# Patient Record
Sex: FEMALE | Race: WHITE | Employment: UNEMPLOYED | URBAN - METROPOLITAN AREA
[De-identification: names, ages, dates, MRNs, and addresses within clinical notes are randomized per-mention and may not be internally consistent; named-entity substitution may affect disease eponyms.]

---

## 2017-01-01 ENCOUNTER — APPOINTMENT (OUTPATIENT)
Dept: GENERAL RADIOLOGY | Age: 82
DRG: 480 | End: 2017-01-01
Attending: INTERNAL MEDICINE
Payer: MEDICARE

## 2017-01-01 ENCOUNTER — APPOINTMENT (OUTPATIENT)
Dept: GENERAL RADIOLOGY | Age: 82
End: 2017-01-01
Attending: PHYSICIAN ASSISTANT
Payer: MEDICARE

## 2017-01-01 ENCOUNTER — APPOINTMENT (OUTPATIENT)
Dept: GENERAL RADIOLOGY | Age: 82
DRG: 480 | End: 2017-01-01
Attending: EMERGENCY MEDICINE
Payer: MEDICARE

## 2017-01-01 ENCOUNTER — HOSPITAL ENCOUNTER (OUTPATIENT)
Dept: GENERAL RADIOLOGY | Age: 82
Discharge: HOME | End: 2017-12-13
Attending: FAMILY MEDICINE
Payer: MEDICARE

## 2017-01-01 ENCOUNTER — HOSPITAL ENCOUNTER (OUTPATIENT)
Dept: GENERAL RADIOLOGY | Age: 82
Discharge: HOME | End: 2017-10-26
Payer: MEDICARE

## 2017-01-01 ENCOUNTER — APPOINTMENT (OUTPATIENT)
Dept: CT IMAGING | Age: 82
DRG: 480 | End: 2017-01-01
Attending: EMERGENCY MEDICINE
Payer: MEDICARE

## 2017-01-01 ENCOUNTER — HOSPITAL ENCOUNTER (OUTPATIENT)
Dept: NON INVASIVE DIAGNOSTICS | Age: 82
Discharge: HOME | End: 2017-09-01
Attending: INTERNAL MEDICINE
Payer: MEDICARE

## 2017-01-01 ENCOUNTER — APPOINTMENT (OUTPATIENT)
Dept: GENERAL RADIOLOGY | Age: 82
End: 2017-01-01
Attending: EMERGENCY MEDICINE
Payer: MEDICARE

## 2017-01-01 ENCOUNTER — HOSPITAL ENCOUNTER (INPATIENT)
Age: 82
LOS: 10 days | Discharge: NURSING, REHAB OR LONG-TERM CARE | DRG: 480 | End: 2017-11-29
Attending: EMERGENCY MEDICINE | Admitting: INTERNAL MEDICINE
Payer: MEDICARE

## 2017-01-01 ENCOUNTER — APPOINTMENT (OUTPATIENT)
Dept: CT IMAGING | Age: 82
DRG: 193 | End: 2017-01-01
Attending: EMERGENCY MEDICINE
Payer: MEDICARE

## 2017-01-01 ENCOUNTER — HOSPITAL ENCOUNTER (EMERGENCY)
Age: 82
Discharge: HOME | End: 2017-08-08
Attending: EMERGENCY MEDICINE
Payer: MEDICARE

## 2017-01-01 ENCOUNTER — TELEPHONE (OUTPATIENT)
Dept: CARDIOLOGY CLINIC | Age: 82
End: 2017-01-01

## 2017-01-01 ENCOUNTER — APPOINTMENT (OUTPATIENT)
Dept: CT IMAGING | Age: 82
End: 2017-01-01
Attending: EMERGENCY MEDICINE
Payer: MEDICARE

## 2017-01-01 ENCOUNTER — HOSPITAL ENCOUNTER (EMERGENCY)
Age: 82
Discharge: HOME | End: 2017-06-28
Attending: EMERGENCY MEDICINE
Payer: MEDICARE

## 2017-01-01 ENCOUNTER — ANESTHESIA EVENT (OUTPATIENT)
Dept: SURGERY | Age: 82
DRG: 480 | End: 2017-01-01
Payer: MEDICARE

## 2017-01-01 ENCOUNTER — OFFICE VISIT (OUTPATIENT)
Dept: CARDIOLOGY CLINIC | Age: 82
End: 2017-01-01

## 2017-01-01 ENCOUNTER — HOSPITAL ENCOUNTER (INPATIENT)
Age: 82
LOS: 4 days | Discharge: HOSPICE | DRG: 193 | End: 2017-12-19
Attending: EMERGENCY MEDICINE | Admitting: INTERNAL MEDICINE
Payer: MEDICARE

## 2017-01-01 ENCOUNTER — APPOINTMENT (OUTPATIENT)
Dept: GENERAL RADIOLOGY | Age: 82
DRG: 193 | End: 2017-01-01
Attending: EMERGENCY MEDICINE
Payer: MEDICARE

## 2017-01-01 ENCOUNTER — ANESTHESIA (OUTPATIENT)
Dept: SURGERY | Age: 82
DRG: 480 | End: 2017-01-01
Payer: MEDICARE

## 2017-01-01 VITALS
HEIGHT: 61 IN | WEIGHT: 97.1 LBS | SYSTOLIC BLOOD PRESSURE: 124 MMHG | TEMPERATURE: 97.2 F | BODY MASS INDEX: 18.33 KG/M2 | OXYGEN SATURATION: 94 % | DIASTOLIC BLOOD PRESSURE: 66 MMHG | HEART RATE: 87 BPM | RESPIRATION RATE: 16 BRPM

## 2017-01-01 VITALS
BODY MASS INDEX: 17.67 KG/M2 | OXYGEN SATURATION: 97 % | DIASTOLIC BLOOD PRESSURE: 62 MMHG | HEART RATE: 51 BPM | WEIGHT: 90 LBS | RESPIRATION RATE: 25 BRPM | TEMPERATURE: 98 F | SYSTOLIC BLOOD PRESSURE: 124 MMHG | HEIGHT: 60 IN

## 2017-01-01 VITALS
HEIGHT: 60 IN | DIASTOLIC BLOOD PRESSURE: 88 MMHG | WEIGHT: 99 LBS | SYSTOLIC BLOOD PRESSURE: 156 MMHG | OXYGEN SATURATION: 96 % | TEMPERATURE: 97.9 F | BODY MASS INDEX: 19.44 KG/M2 | RESPIRATION RATE: 23 BRPM | HEART RATE: 80 BPM

## 2017-01-01 VITALS
HEIGHT: 60 IN | BODY MASS INDEX: 17.67 KG/M2 | SYSTOLIC BLOOD PRESSURE: 130 MMHG | WEIGHT: 90 LBS | DIASTOLIC BLOOD PRESSURE: 70 MMHG | HEART RATE: 83 BPM | OXYGEN SATURATION: 96 %

## 2017-01-01 VITALS
BODY MASS INDEX: 18.31 KG/M2 | SYSTOLIC BLOOD PRESSURE: 122 MMHG | WEIGHT: 97 LBS | HEIGHT: 61 IN | DIASTOLIC BLOOD PRESSURE: 54 MMHG | OXYGEN SATURATION: 94 % | RESPIRATION RATE: 16 BRPM | HEART RATE: 72 BPM | TEMPERATURE: 97.3 F

## 2017-01-01 DIAGNOSIS — R00.1 BRADYCARDIA: ICD-10-CM

## 2017-01-01 DIAGNOSIS — J18.9 PNEUMONIA OF RIGHT LUNG DUE TO INFECTIOUS ORGANISM, UNSPECIFIED PART OF LUNG: Primary | ICD-10-CM

## 2017-01-01 DIAGNOSIS — I35.0 AORTIC STENOSIS, MODERATE: ICD-10-CM

## 2017-01-01 DIAGNOSIS — M25.552 LEFT HIP PAIN: Primary | ICD-10-CM

## 2017-01-01 DIAGNOSIS — I35.0 AORTIC STENOSIS, MODERATE: Primary | ICD-10-CM

## 2017-01-01 DIAGNOSIS — R55 SYNCOPE AND COLLAPSE: Primary | ICD-10-CM

## 2017-01-01 DIAGNOSIS — I10 HIGH BLOOD PRESSURE: ICD-10-CM

## 2017-01-01 DIAGNOSIS — R41.82 ALTERED MENTAL STATUS, UNSPECIFIED ALTERED MENTAL STATUS TYPE: ICD-10-CM

## 2017-01-01 DIAGNOSIS — Z51.5 COMFORT MEASURES ONLY STATUS: ICD-10-CM

## 2017-01-01 DIAGNOSIS — R13.10 DIFFICULTY IN SWALLOWING: ICD-10-CM

## 2017-01-01 DIAGNOSIS — S72.145A CLOSED NONDISPLACED INTERTROCHANTERIC FRACTURE OF LEFT FEMUR, INITIAL ENCOUNTER (HCC): Primary | ICD-10-CM

## 2017-01-01 DIAGNOSIS — R55 NEAR SYNCOPE: ICD-10-CM

## 2017-01-01 DIAGNOSIS — R53.81 DEBILITY: ICD-10-CM

## 2017-01-01 DIAGNOSIS — E86.0 DEHYDRATION: Primary | ICD-10-CM

## 2017-01-01 DIAGNOSIS — R13.11 ORAL PHASE DYSPHAGIA: ICD-10-CM

## 2017-01-01 DIAGNOSIS — Z71.89 ACP (ADVANCE CARE PLANNING): ICD-10-CM

## 2017-01-01 LAB
ABO + RH BLD: NORMAL
ABO + RH BLD: NORMAL
ALBUMIN SERPL BCP-MCNC: 4.4 G/DL (ref 3.4–5)
ALBUMIN SERPL-MCNC: 3 G/DL (ref 3.4–5)
ALBUMIN SERPL-MCNC: 3.9 G/DL (ref 3.4–5)
ALBUMIN/GLOB SERPL: 0.8 {RATIO} (ref 0.8–1.7)
ALBUMIN/GLOB SERPL: 1 {RATIO} (ref 0.8–1.7)
ALBUMIN/GLOB SERPL: 1.2 {RATIO} (ref 0.8–1.7)
ALP SERPL-CCNC: 129 U/L (ref 45–117)
ALP SERPL-CCNC: 66 U/L (ref 45–117)
ALP SERPL-CCNC: 85 U/L (ref 45–117)
ALT SERPL-CCNC: 18 U/L (ref 13–56)
ALT SERPL-CCNC: 23 U/L (ref 13–56)
ALT SERPL-CCNC: 24 U/L (ref 13–56)
ANION GAP BLD CALC-SCNC: 9 MMOL/L (ref 3–18)
ANION GAP SERPL CALC-SCNC: 12 MMOL/L (ref 3–18)
ANION GAP SERPL CALC-SCNC: 7 MMOL/L (ref 3–18)
ANION GAP SERPL CALC-SCNC: 8 MMOL/L (ref 3–18)
ANION GAP SERPL CALC-SCNC: 9 MMOL/L (ref 3–18)
ANION GAP SERPL CALC-SCNC: 9 MMOL/L (ref 3–18)
APPEARANCE UR: ABNORMAL
APPEARANCE UR: ABNORMAL
APPEARANCE UR: CLEAR
AST SERPL W P-5'-P-CCNC: 26 U/L (ref 15–37)
AST SERPL-CCNC: 22 U/L (ref 15–37)
AST SERPL-CCNC: 24 U/L (ref 15–37)
ATRIAL RATE: 100 BPM
ATRIAL RATE: 51 BPM
ATRIAL RATE: 61 BPM
ATRIAL RATE: 63 BPM
ATRIAL RATE: 65 BPM
ATRIAL RATE: 87 BPM
BACTERIA URNS QL MICRO: ABNORMAL /HPF
BACTERIA URNS QL MICRO: ABNORMAL /HPF
BACTERIA URNS QL MICRO: NEGATIVE /HPF
BASOPHILS # BLD AUTO: 0 K/UL (ref 0–0.06)
BASOPHILS # BLD: 0 K/UL (ref 0–0.06)
BASOPHILS # BLD: 0 K/UL (ref 0–0.1)
BASOPHILS # BLD: 1 % (ref 0–2)
BASOPHILS NFR BLD: 0 % (ref 0–2)
BASOPHILS NFR BLD: 0 % (ref 0–3)
BASOPHILS NFR BLD: 0 % (ref 0–3)
BASOPHILS NFR BLD: 1 % (ref 0–2)
BILIRUB SERPL-MCNC: 0.5 MG/DL (ref 0.2–1)
BILIRUB SERPL-MCNC: 0.7 MG/DL (ref 0.2–1)
BILIRUB SERPL-MCNC: 1.1 MG/DL (ref 0.2–1)
BILIRUB UR QL: NEGATIVE
BLD PROD TYP BPU: NORMAL
BLOOD GROUP ANTIBODIES SERPL: NORMAL
BLOOD GROUP ANTIBODIES SERPL: NORMAL
BPU ID: NORMAL
BUN SERPL-MCNC: 22 MG/DL (ref 7–18)
BUN SERPL-MCNC: 30 MG/DL (ref 7–18)
BUN SERPL-MCNC: 32 MG/DL (ref 7–18)
BUN SERPL-MCNC: 32 MG/DL (ref 7–18)
BUN SERPL-MCNC: 34 MG/DL (ref 7–18)
BUN SERPL-MCNC: 35 MG/DL (ref 7–18)
BUN SERPL-MCNC: 37 MG/DL (ref 7–18)
BUN SERPL-MCNC: 38 MG/DL (ref 7–18)
BUN SERPL-MCNC: 38 MG/DL (ref 7–18)
BUN SERPL-MCNC: 45 MG/DL (ref 7–18)
BUN SERPL-MCNC: 63 MG/DL (ref 7–18)
BUN/CREAT SERPL: 100 (ref 12–20)
BUN/CREAT SERPL: 31 (ref 12–20)
BUN/CREAT SERPL: 33 (ref 12–20)
BUN/CREAT SERPL: 35 (ref 12–20)
BUN/CREAT SERPL: 38 (ref 12–20)
BUN/CREAT SERPL: 38 (ref 12–20)
BUN/CREAT SERPL: 55 (ref 12–20)
BUN/CREAT SERPL: 58 (ref 12–20)
BUN/CREAT SERPL: 66 (ref 12–20)
BUN/CREAT SERPL: 67 (ref 12–20)
BUN/CREAT SERPL: 78 (ref 12–20)
CALCIUM SERPL-MCNC: 7.7 MG/DL (ref 8.5–10.1)
CALCIUM SERPL-MCNC: 7.8 MG/DL (ref 8.5–10.1)
CALCIUM SERPL-MCNC: 7.9 MG/DL (ref 8.5–10.1)
CALCIUM SERPL-MCNC: 8.2 MG/DL (ref 8.5–10.1)
CALCIUM SERPL-MCNC: 8.2 MG/DL (ref 8.5–10.1)
CALCIUM SERPL-MCNC: 8.5 MG/DL (ref 8.5–10.1)
CALCIUM SERPL-MCNC: 8.6 MG/DL (ref 8.5–10.1)
CALCIUM SERPL-MCNC: 8.6 MG/DL (ref 8.5–10.1)
CALCIUM SERPL-MCNC: 8.8 MG/DL (ref 8.5–10.1)
CALCIUM SERPL-MCNC: 9.1 MG/DL (ref 8.5–10.1)
CALCIUM SERPL-MCNC: 9.7 MG/DL (ref 8.5–10.1)
CALCULATED P AXIS, ECG09: 67 DEGREES
CALCULATED P AXIS, ECG09: 69 DEGREES
CALCULATED P AXIS, ECG09: 77 DEGREES
CALCULATED P AXIS, ECG09: 79 DEGREES
CALCULATED R AXIS, ECG10: -45 DEGREES
CALCULATED R AXIS, ECG10: -50 DEGREES
CALCULATED R AXIS, ECG10: -51 DEGREES
CALCULATED R AXIS, ECG10: -54 DEGREES
CALCULATED R AXIS, ECG10: -58 DEGREES
CALCULATED T AXIS, ECG11: 50 DEGREES
CALCULATED T AXIS, ECG11: 54 DEGREES
CALCULATED T AXIS, ECG11: 56 DEGREES
CALCULATED T AXIS, ECG11: 56 DEGREES
CALCULATED T AXIS, ECG11: 58 DEGREES
CALCULATED T AXIS, ECG11: 69 DEGREES
CALLED TO:,BCALL1: NORMAL
CALLED TO:,BCALL1: NORMAL
CALLED TO:,BCALL2: NORMAL
CALLED TO:,BCALL2: NORMAL
CHLORIDE SERPL-SCNC: 102 MMOL/L (ref 100–108)
CHLORIDE SERPL-SCNC: 103 MMOL/L (ref 100–108)
CHLORIDE SERPL-SCNC: 103 MMOL/L (ref 100–108)
CHLORIDE SERPL-SCNC: 105 MMOL/L (ref 100–108)
CHLORIDE SERPL-SCNC: 107 MMOL/L (ref 100–108)
CHLORIDE SERPL-SCNC: 109 MMOL/L (ref 100–108)
CHLORIDE SERPL-SCNC: 109 MMOL/L (ref 100–108)
CHLORIDE SERPL-SCNC: 97 MMOL/L (ref 100–108)
CHLORIDE SERPL-SCNC: 98 MMOL/L (ref 100–108)
CK MB CFR SERPL CALC: 2.4 % (ref 0–4)
CK MB CFR SERPL CALC: NORMAL % (ref 0–4)
CK MB SERPL-MCNC: 1 NG/ML (ref 5–25)
CK MB SERPL-MCNC: <1 NG/ML (ref 5–25)
CK SERPL-CCNC: 35 U/L (ref 26–192)
CK SERPL-CCNC: 41 U/L (ref 26–192)
CO2 SERPL-SCNC: 22 MMOL/L (ref 21–32)
CO2 SERPL-SCNC: 23 MMOL/L (ref 21–32)
CO2 SERPL-SCNC: 24 MMOL/L (ref 21–32)
CO2 SERPL-SCNC: 24 MMOL/L (ref 21–32)
CO2 SERPL-SCNC: 25 MMOL/L (ref 21–32)
CO2 SERPL-SCNC: 26 MMOL/L (ref 21–32)
CO2 SERPL-SCNC: 28 MMOL/L (ref 21–32)
CO2 SERPL-SCNC: 28 MMOL/L (ref 21–32)
COLOR UR: YELLOW
CREAT SERPL-MCNC: 0.4 MG/DL (ref 0.6–1.3)
CREAT SERPL-MCNC: 0.49 MG/DL (ref 0.6–1.3)
CREAT SERPL-MCNC: 0.55 MG/DL (ref 0.6–1.3)
CREAT SERPL-MCNC: 0.55 MG/DL (ref 0.6–1.3)
CREAT SERPL-MCNC: 0.63 MG/DL (ref 0.6–1.3)
CREAT SERPL-MCNC: 0.68 MG/DL (ref 0.6–1.3)
CREAT SERPL-MCNC: 0.84 MG/DL (ref 0.6–1.3)
CREAT SERPL-MCNC: 0.93 MG/DL (ref 0.6–1.3)
CREAT SERPL-MCNC: 0.96 MG/DL (ref 0.6–1.3)
CREAT SERPL-MCNC: 0.97 MG/DL (ref 0.6–1.3)
CREAT SERPL-MCNC: 1.16 MG/DL (ref 0.6–1.3)
CROSSMATCH RESULT,%XM: NORMAL
DATE LAST DOSE: ABNORMAL
DIAGNOSIS, 93000: NORMAL
DIFFERENTIAL METHOD BLD: ABNORMAL
EOSINOPHIL # BLD: 0 K/UL (ref 0–0.4)
EOSINOPHIL # BLD: 0.1 K/UL (ref 0–0.4)
EOSINOPHIL # BLD: 0.2 K/UL (ref 0–0.4)
EOSINOPHIL # BLD: 0.2 K/UL (ref 0–0.4)
EOSINOPHIL NFR BLD: 0 % (ref 0–5)
EOSINOPHIL NFR BLD: 1 % (ref 0–5)
EOSINOPHIL NFR BLD: 2 % (ref 0–5)
EPITH CASTS URNS QL MICRO: ABNORMAL /LPF (ref 0–5)
EPITH CASTS URNS QL MICRO: ABNORMAL /LPF (ref 0–5)
EPITH CASTS URNS QL MICRO: NORMAL /LPF (ref 0–5)
ERYTHROCYTE [DISTWIDTH] IN BLOOD BY AUTOMATED COUNT: 14.4 % (ref 11.6–14.5)
ERYTHROCYTE [DISTWIDTH] IN BLOOD BY AUTOMATED COUNT: 14.6 % (ref 11.6–14.5)
ERYTHROCYTE [DISTWIDTH] IN BLOOD BY AUTOMATED COUNT: 14.8 % (ref 11.6–14.5)
ERYTHROCYTE [DISTWIDTH] IN BLOOD BY AUTOMATED COUNT: 15 % (ref 11.6–14.5)
ERYTHROCYTE [DISTWIDTH] IN BLOOD BY AUTOMATED COUNT: 15.6 % (ref 11.6–14.5)
ERYTHROCYTE [DISTWIDTH] IN BLOOD BY AUTOMATED COUNT: 15.6 % (ref 11.6–14.5)
ERYTHROCYTE [DISTWIDTH] IN BLOOD BY AUTOMATED COUNT: 15.8 % (ref 11.6–14.5)
ERYTHROCYTE [DISTWIDTH] IN BLOOD BY AUTOMATED COUNT: 15.9 % (ref 11.6–14.5)
ERYTHROCYTE [DISTWIDTH] IN BLOOD BY AUTOMATED COUNT: 16.9 % (ref 11.6–14.5)
ERYTHROCYTE [DISTWIDTH] IN BLOOD BY AUTOMATED COUNT: 17.6 % (ref 11.6–14.5)
ERYTHROCYTE [DISTWIDTH] IN BLOOD BY AUTOMATED COUNT: 17.8 % (ref 11.6–14.5)
ERYTHROCYTE [DISTWIDTH] IN BLOOD BY AUTOMATED COUNT: 17.9 % (ref 11.6–14.5)
FLUAV AG NPH QL IA: NEGATIVE
FLUBV AG NOSE QL IA: NEGATIVE
GLOBULIN SER CALC-MCNC: 3.8 G/DL (ref 2–4)
GLOBULIN SER CALC-MCNC: 3.9 G/DL (ref 2–4)
GLOBULIN SER CALC-MCNC: 4 G/DL (ref 2–4)
GLUCOSE SERPL-MCNC: 101 MG/DL (ref 74–99)
GLUCOSE SERPL-MCNC: 104 MG/DL (ref 74–99)
GLUCOSE SERPL-MCNC: 132 MG/DL (ref 74–99)
GLUCOSE SERPL-MCNC: 137 MG/DL (ref 74–99)
GLUCOSE SERPL-MCNC: 141 MG/DL (ref 74–99)
GLUCOSE SERPL-MCNC: 148 MG/DL (ref 74–99)
GLUCOSE SERPL-MCNC: 58 MG/DL (ref 74–99)
GLUCOSE SERPL-MCNC: 89 MG/DL (ref 74–99)
GLUCOSE SERPL-MCNC: 90 MG/DL (ref 74–99)
GLUCOSE SERPL-MCNC: 94 MG/DL (ref 74–99)
GLUCOSE SERPL-MCNC: 95 MG/DL (ref 74–99)
GLUCOSE UR STRIP.AUTO-MCNC: NEGATIVE MG/DL
HCT VFR BLD AUTO: 20.3 % (ref 35–45)
HCT VFR BLD AUTO: 21.7 % (ref 35–45)
HCT VFR BLD AUTO: 22.7 % (ref 35–45)
HCT VFR BLD AUTO: 23.3 % (ref 35–45)
HCT VFR BLD AUTO: 23.8 % (ref 35–45)
HCT VFR BLD AUTO: 26.1 % (ref 35–45)
HCT VFR BLD AUTO: 28.4 % (ref 35–45)
HCT VFR BLD AUTO: 29.4 % (ref 35–45)
HCT VFR BLD AUTO: 32.3 % (ref 35–45)
HCT VFR BLD AUTO: 34.8 % (ref 35–45)
HCT VFR BLD AUTO: 35.6 % (ref 35–45)
HCT VFR BLD AUTO: 37.7 % (ref 35–45)
HCT VFR BLD AUTO: 37.7 % (ref 35–45)
HCT VFR BLD AUTO: 38.2 % (ref 35–45)
HCT VFR BLD AUTO: 40.7 % (ref 35–45)
HCT VFR BLD AUTO: 40.9 % (ref 35–45)
HCT VFR BLD AUTO: 42.2 % (ref 35–45)
HCT VFR BLD AUTO: 42.5 % (ref 35–45)
HEMOCCULT STL QL: POSITIVE
HEMOCCULT STL QL: POSITIVE
HGB BLD-MCNC: 10.5 G/DL (ref 12–16)
HGB BLD-MCNC: 11.6 G/DL (ref 12–16)
HGB BLD-MCNC: 11.7 G/DL (ref 12–16)
HGB BLD-MCNC: 12.4 G/DL (ref 12–16)
HGB BLD-MCNC: 12.5 G/DL (ref 12–16)
HGB BLD-MCNC: 12.8 G/DL (ref 12–16)
HGB BLD-MCNC: 13.3 G/DL (ref 12–16)
HGB BLD-MCNC: 13.7 G/DL (ref 12–16)
HGB BLD-MCNC: 13.7 G/DL (ref 12–16)
HGB BLD-MCNC: 13.9 G/DL (ref 12–16)
HGB BLD-MCNC: 6.8 G/DL (ref 12–16)
HGB BLD-MCNC: 7.1 G/DL (ref 12–16)
HGB BLD-MCNC: 7.2 G/DL (ref 12–16)
HGB BLD-MCNC: 7.5 G/DL (ref 12–16)
HGB BLD-MCNC: 7.9 G/DL (ref 12–16)
HGB BLD-MCNC: 8.4 G/DL (ref 12–16)
HGB BLD-MCNC: 9.5 G/DL (ref 12–16)
HGB BLD-MCNC: 9.9 G/DL (ref 12–16)
HGB UR QL STRIP: ABNORMAL
HGB UR QL STRIP: NEGATIVE
HGB UR QL STRIP: NEGATIVE
INR PPP: 0.9 (ref 0.8–1.2)
INR PPP: 1.1 (ref 0.8–1.2)
KETONES UR QL STRIP.AUTO: 15 MG/DL
KETONES UR QL STRIP.AUTO: ABNORMAL MG/DL
KETONES UR QL STRIP.AUTO: NEGATIVE MG/DL
L PNEUMO AG UR QL IA: NEGATIVE
LACTATE BLD-SCNC: 1.9 MMOL/L (ref 0.4–2)
LEUKOCYTE ESTERASE UR QL STRIP.AUTO: NEGATIVE
LYMPHOCYTES # BLD AUTO: 29 % (ref 21–52)
LYMPHOCYTES # BLD: 0.6 K/UL (ref 0.8–3.5)
LYMPHOCYTES # BLD: 0.7 K/UL (ref 0.9–3.6)
LYMPHOCYTES # BLD: 0.9 K/UL (ref 0.9–3.6)
LYMPHOCYTES # BLD: 1 K/UL (ref 0.9–3.6)
LYMPHOCYTES # BLD: 1 K/UL (ref 0.9–3.6)
LYMPHOCYTES # BLD: 1.1 K/UL (ref 0.9–3.6)
LYMPHOCYTES # BLD: 1.1 K/UL (ref 0.9–3.6)
LYMPHOCYTES # BLD: 1.2 K/UL (ref 0.8–3.5)
LYMPHOCYTES # BLD: 1.3 K/UL (ref 0.9–3.6)
LYMPHOCYTES # BLD: 1.3 K/UL (ref 0.9–3.6)
LYMPHOCYTES # BLD: 2 K/UL (ref 0.9–3.6)
LYMPHOCYTES NFR BLD: 12 % (ref 21–52)
LYMPHOCYTES NFR BLD: 13 % (ref 21–52)
LYMPHOCYTES NFR BLD: 15 % (ref 20–51)
LYMPHOCYTES NFR BLD: 16 % (ref 21–52)
LYMPHOCYTES NFR BLD: 16 % (ref 21–52)
LYMPHOCYTES NFR BLD: 23 % (ref 21–52)
LYMPHOCYTES NFR BLD: 24 % (ref 21–52)
LYMPHOCYTES NFR BLD: 26 % (ref 21–52)
LYMPHOCYTES NFR BLD: 9 % (ref 20–51)
LYMPHOCYTES NFR BLD: 9 % (ref 21–52)
MAGNESIUM SERPL-MCNC: 1.9 MG/DL (ref 1.6–2.6)
MAGNESIUM SERPL-MCNC: 1.9 MG/DL (ref 1.6–2.6)
MCH RBC QN AUTO: 30.2 PG (ref 24–34)
MCH RBC QN AUTO: 30.6 PG (ref 24–34)
MCH RBC QN AUTO: 30.7 PG (ref 24–34)
MCH RBC QN AUTO: 30.7 PG (ref 24–34)
MCH RBC QN AUTO: 30.8 PG (ref 24–34)
MCH RBC QN AUTO: 30.9 PG (ref 24–34)
MCH RBC QN AUTO: 30.9 PG (ref 24–34)
MCH RBC QN AUTO: 31 PG (ref 24–34)
MCH RBC QN AUTO: 31.3 PG (ref 24–34)
MCH RBC QN AUTO: 31.8 PG (ref 24–34)
MCHC RBC AUTO-ENTMCNC: 31.7 G/DL (ref 31–37)
MCHC RBC AUTO-ENTMCNC: 32.2 G/DL (ref 31–37)
MCHC RBC AUTO-ENTMCNC: 32.5 G/DL (ref 31–37)
MCHC RBC AUTO-ENTMCNC: 32.9 G/DL (ref 31–37)
MCHC RBC AUTO-ENTMCNC: 33.2 G/DL (ref 31–37)
MCHC RBC AUTO-ENTMCNC: 33.2 G/DL (ref 31–37)
MCHC RBC AUTO-ENTMCNC: 33.5 G/DL (ref 31–37)
MCHC RBC AUTO-ENTMCNC: 33.5 G/DL (ref 31–37)
MCV RBC AUTO: 92.2 FL (ref 74–97)
MCV RBC AUTO: 92.2 FL (ref 74–97)
MCV RBC AUTO: 92.3 FL (ref 74–97)
MCV RBC AUTO: 92.8 FL (ref 74–97)
MCV RBC AUTO: 93 FL (ref 74–97)
MCV RBC AUTO: 93.2 FL (ref 74–97)
MCV RBC AUTO: 93.8 FL (ref 74–97)
MCV RBC AUTO: 95.5 FL (ref 74–97)
MCV RBC AUTO: 96.2 FL (ref 74–97)
MCV RBC AUTO: 96.7 FL (ref 74–97)
MCV RBC AUTO: 97 FL (ref 74–97)
MCV RBC AUTO: 97.4 FL (ref 74–97)
MONOCYTES # BLD: 0.3 K/UL (ref 0–1)
MONOCYTES # BLD: 0.4 K/UL (ref 0.05–1.2)
MONOCYTES # BLD: 0.5 K/UL (ref 0.05–1.2)
MONOCYTES # BLD: 0.7 K/UL (ref 0.05–1.2)
MONOCYTES # BLD: 0.7 K/UL (ref 0.05–1.2)
MONOCYTES # BLD: 0.8 K/UL (ref 0.05–1.2)
MONOCYTES # BLD: 0.8 K/UL (ref 0.05–1.2)
MONOCYTES # BLD: 0.9 K/UL (ref 0–1)
MONOCYTES # BLD: 1 K/UL (ref 0.05–1.2)
MONOCYTES NFR BLD AUTO: 11 % (ref 3–10)
MONOCYTES NFR BLD: 11 % (ref 3–10)
MONOCYTES NFR BLD: 12 % (ref 2–9)
MONOCYTES NFR BLD: 12 % (ref 3–10)
MONOCYTES NFR BLD: 12 % (ref 3–10)
MONOCYTES NFR BLD: 13 % (ref 3–10)
MONOCYTES NFR BLD: 13 % (ref 3–10)
MONOCYTES NFR BLD: 16 % (ref 3–10)
MONOCYTES NFR BLD: 5 % (ref 2–9)
MONOCYTES NFR BLD: 9 % (ref 3–10)
MONOCYTES NFR BLD: 9 % (ref 3–10)
NEUTS BAND NFR BLD MANUAL: 2 % (ref 0–5)
NEUTS SEG # BLD: 2.6 K/UL (ref 1.8–8)
NEUTS SEG # BLD: 2.8 K/UL (ref 1.8–8)
NEUTS SEG # BLD: 3.1 K/UL (ref 1.8–8)
NEUTS SEG # BLD: 4.1 K/UL (ref 1.8–8)
NEUTS SEG # BLD: 4.9 K/UL (ref 1.8–8)
NEUTS SEG # BLD: 5.1 K/UL (ref 1.8–8)
NEUTS SEG # BLD: 5.3 K/UL (ref 1.8–8)
NEUTS SEG # BLD: 5.5 K/UL (ref 1.8–8)
NEUTS SEG # BLD: 5.6 K/UL (ref 1.8–8)
NEUTS SEG # BLD: 6.1 K/UL (ref 1.8–8)
NEUTS SEG # BLD: 6.2 K/UL (ref 1.8–8)
NEUTS SEG NFR BLD AUTO: 58 % (ref 40–73)
NEUTS SEG NFR BLD: 61 % (ref 40–73)
NEUTS SEG NFR BLD: 61 % (ref 40–73)
NEUTS SEG NFR BLD: 62 % (ref 40–73)
NEUTS SEG NFR BLD: 69 % (ref 40–73)
NEUTS SEG NFR BLD: 70 % (ref 42–75)
NEUTS SEG NFR BLD: 71 % (ref 40–73)
NEUTS SEG NFR BLD: 73 % (ref 40–73)
NEUTS SEG NFR BLD: 75 % (ref 40–73)
NEUTS SEG NFR BLD: 82 % (ref 40–73)
NEUTS SEG NFR BLD: 86 % (ref 42–75)
NITRITE UR QL STRIP.AUTO: NEGATIVE
P-R INTERVAL, ECG05: 208 MS
P-R INTERVAL, ECG05: 220 MS
P-R INTERVAL, ECG05: 232 MS
P-R INTERVAL, ECG05: 232 MS
P-R INTERVAL, ECG05: 238 MS
P-R INTERVAL, ECG05: 240 MS
PH UR STRIP: 5.5 [PH] (ref 5–8)
PH UR STRIP: 6.5 [PH] (ref 5–8)
PH UR STRIP: 7 [PH] (ref 5–8)
PLATELET # BLD AUTO: 101 K/UL (ref 135–420)
PLATELET # BLD AUTO: 110 K/UL (ref 135–420)
PLATELET # BLD AUTO: 132 K/UL (ref 135–420)
PLATELET # BLD AUTO: 147 K/UL (ref 135–420)
PLATELET # BLD AUTO: 150 K/UL (ref 135–420)
PLATELET # BLD AUTO: 155 K/UL (ref 135–420)
PLATELET # BLD AUTO: 156 K/UL (ref 135–420)
PLATELET # BLD AUTO: 162 K/UL (ref 135–420)
PLATELET # BLD AUTO: 168 K/UL (ref 135–420)
PLATELET # BLD AUTO: 171 K/UL (ref 135–420)
PLATELET # BLD AUTO: 176 K/UL (ref 135–420)
PLATELET # BLD AUTO: 204 K/UL (ref 135–420)
PLATELET COMMENTS,PCOM: ABNORMAL
PLATELET COMMENTS,PCOM: ABNORMAL
PMV BLD AUTO: 10.1 FL (ref 9.2–11.8)
PMV BLD AUTO: 10.3 FL (ref 9.2–11.8)
PMV BLD AUTO: 10.6 FL (ref 9.2–11.8)
PMV BLD AUTO: 11.8 FL (ref 9.2–11.8)
PMV BLD AUTO: 9.2 FL (ref 9.2–11.8)
PMV BLD AUTO: 9.2 FL (ref 9.2–11.8)
PMV BLD AUTO: 9.3 FL (ref 9.2–11.8)
PMV BLD AUTO: 9.3 FL (ref 9.2–11.8)
PMV BLD AUTO: 9.5 FL (ref 9.2–11.8)
PMV BLD AUTO: 9.5 FL (ref 9.2–11.8)
PMV BLD AUTO: 9.8 FL (ref 9.2–11.8)
PMV BLD AUTO: 9.8 FL (ref 9.2–11.8)
POTASSIUM SERPL-SCNC: 3.7 MMOL/L (ref 3.5–5.5)
POTASSIUM SERPL-SCNC: 3.7 MMOL/L (ref 3.5–5.5)
POTASSIUM SERPL-SCNC: 3.8 MMOL/L (ref 3.5–5.5)
POTASSIUM SERPL-SCNC: 4 MMOL/L (ref 3.5–5.5)
POTASSIUM SERPL-SCNC: 4.1 MMOL/L (ref 3.5–5.5)
POTASSIUM SERPL-SCNC: 4.1 MMOL/L (ref 3.5–5.5)
POTASSIUM SERPL-SCNC: 4.2 MMOL/L (ref 3.5–5.5)
POTASSIUM SERPL-SCNC: 4.3 MMOL/L (ref 3.5–5.5)
POTASSIUM SERPL-SCNC: 4.6 MMOL/L (ref 3.5–5.5)
POTASSIUM SERPL-SCNC: 4.7 MMOL/L (ref 3.5–5.5)
POTASSIUM SERPL-SCNC: 5.1 MMOL/L (ref 3.5–5.5)
PROT SERPL-MCNC: 7 G/DL (ref 6.4–8.2)
PROT SERPL-MCNC: 7.8 G/DL (ref 6.4–8.2)
PROT SERPL-MCNC: 8.2 G/DL (ref 6.4–8.2)
PROT UR STRIP-MCNC: 30 MG/DL
PROT UR STRIP-MCNC: 30 MG/DL
PROT UR STRIP-MCNC: ABNORMAL MG/DL
PROTHROMBIN TIME: 11.6 SEC (ref 11.5–15.2)
PROTHROMBIN TIME: 13.4 SEC (ref 11.5–15.2)
Q-T INTERVAL, ECG07: 386 MS
Q-T INTERVAL, ECG07: 420 MS
Q-T INTERVAL, ECG07: 440 MS
Q-T INTERVAL, ECG07: 442 MS
Q-T INTERVAL, ECG07: 476 MS
Q-T INTERVAL, ECG07: 516 MS
QRS DURATION, ECG06: 84 MS
QRS DURATION, ECG06: 84 MS
QRS DURATION, ECG06: 92 MS
QRS DURATION, ECG06: 94 MS
QRS DURATION, ECG06: 96 MS
QRS DURATION, ECG06: 96 MS
QTC CALCULATION (BEZET), ECG08: 438 MS
QTC CALCULATION (BEZET), ECG08: 446 MS
QTC CALCULATION (BEZET), ECG08: 450 MS
QTC CALCULATION (BEZET), ECG08: 459 MS
QTC CALCULATION (BEZET), ECG08: 464 MS
QTC CALCULATION (BEZET), ECG08: 519 MS
RBC # BLD AUTO: 2.34 M/UL (ref 4.2–5.3)
RBC # BLD AUTO: 2.44 M/UL (ref 4.2–5.3)
RBC # BLD AUTO: 2.56 M/UL (ref 4.2–5.3)
RBC # BLD AUTO: 2.68 M/UL (ref 4.2–5.3)
RBC # BLD AUTO: 3.08 M/UL (ref 4.2–5.3)
RBC # BLD AUTO: 3.48 M/UL (ref 4.2–5.3)
RBC # BLD AUTO: 3.68 M/UL (ref 4.2–5.3)
RBC # BLD AUTO: 4.02 M/UL (ref 4.2–5.3)
RBC # BLD AUTO: 4.09 M/UL (ref 4.2–5.3)
RBC # BLD AUTO: 4.14 M/UL (ref 4.2–5.3)
RBC # BLD AUTO: 4.42 M/UL (ref 4.2–5.3)
RBC # BLD AUTO: 4.53 M/UL (ref 4.2–5.3)
RBC #/AREA URNS HPF: ABNORMAL /HPF (ref 0–5)
RBC #/AREA URNS HPF: NEGATIVE /HPF (ref 0–5)
RBC MORPH BLD: ABNORMAL
RBC MORPH BLD: ABNORMAL
REPORTED DOSE,DOSE: ABNORMAL UNITS
REPORTED DOSE/TIME,TMG: 1100
S PNEUM AG UR QL: NEGATIVE
SODIUM SERPL-SCNC: 131 MMOL/L (ref 136–145)
SODIUM SERPL-SCNC: 134 MMOL/L (ref 136–145)
SODIUM SERPL-SCNC: 135 MMOL/L (ref 136–145)
SODIUM SERPL-SCNC: 135 MMOL/L (ref 136–145)
SODIUM SERPL-SCNC: 136 MMOL/L (ref 136–145)
SODIUM SERPL-SCNC: 138 MMOL/L (ref 136–145)
SODIUM SERPL-SCNC: 139 MMOL/L (ref 136–145)
SODIUM SERPL-SCNC: 141 MMOL/L (ref 136–145)
SODIUM SERPL-SCNC: 143 MMOL/L (ref 136–145)
SP GR UR REFRACTOMETRY: 1.02 (ref 1–1.03)
SPECIMEN EXP DATE BLD: NORMAL
SPECIMEN EXP DATE BLD: NORMAL
STATUS OF UNIT,%ST: NORMAL
TROPONIN I SERPL-MCNC: <0.02 NG/ML (ref 0–0.04)
TROPONIN I SERPL-MCNC: <0.02 NG/ML (ref 0–0.06)
UNIT DIVISION, %UDIV: 0
UROBILINOGEN UR QL STRIP.AUTO: 1 EU/DL (ref 0.2–1)
VANCOMYCIN SERPL-MCNC: 6.9 UG/ML (ref 5–40)
VANCOMYCIN TROUGH SERPL-MCNC: 9.4 UG/ML (ref 10–20)
VENTRICULAR RATE, ECG03: 51 BPM
VENTRICULAR RATE, ECG03: 61 BPM
VENTRICULAR RATE, ECG03: 63 BPM
VENTRICULAR RATE, ECG03: 65 BPM
VENTRICULAR RATE, ECG03: 68 BPM
VENTRICULAR RATE, ECG03: 87 BPM
WBC # BLD AUTO: 4.5 K/UL (ref 4.6–13.2)
WBC # BLD AUTO: 4.6 K/UL (ref 4.6–13.2)
WBC # BLD AUTO: 4.9 K/UL (ref 4.6–13.2)
WBC # BLD AUTO: 5.7 K/UL (ref 4.6–13.2)
WBC # BLD AUTO: 5.9 K/UL (ref 4.6–13.2)
WBC # BLD AUTO: 6.2 K/UL (ref 4.6–13.2)
WBC # BLD AUTO: 7 K/UL (ref 4.6–13.2)
WBC # BLD AUTO: 7.5 K/UL (ref 4.6–13.2)
WBC # BLD AUTO: 7.7 K/UL (ref 4.6–13.2)
WBC # BLD AUTO: 7.7 K/UL (ref 4.6–13.2)
WBC # BLD AUTO: 8.2 K/UL (ref 4.6–13.2)
WBC # BLD AUTO: 8.4 K/UL (ref 4.6–13.2)
WBC URNS QL MICRO: ABNORMAL /HPF (ref 0–4)
WBC URNS QL MICRO: ABNORMAL /HPF (ref 0–4)
WBC URNS QL MICRO: NORMAL /HPF (ref 0–4)

## 2017-01-01 PROCEDURE — G8997 SWALLOW GOAL STATUS: HCPCS

## 2017-01-01 PROCEDURE — 85025 COMPLETE CBC W/AUTO DIFF WBC: CPT | Performed by: EMERGENCY MEDICINE

## 2017-01-01 PROCEDURE — 93005 ELECTROCARDIOGRAM TRACING: CPT

## 2017-01-01 PROCEDURE — 74011250636 HC RX REV CODE- 250/636: Performed by: INTERNAL MEDICINE

## 2017-01-01 PROCEDURE — 77030031139 HC SUT VCRL2 J&J -A: Performed by: ORTHOPAEDIC SURGERY

## 2017-01-01 PROCEDURE — 82272 OCCULT BLD FECES 1-3 TESTS: CPT | Performed by: FAMILY MEDICINE

## 2017-01-01 PROCEDURE — 74011000250 HC RX REV CODE- 250: Performed by: EMERGENCY MEDICINE

## 2017-01-01 PROCEDURE — 99284 EMERGENCY DEPT VISIT MOD MDM: CPT

## 2017-01-01 PROCEDURE — 85610 PROTHROMBIN TIME: CPT | Performed by: EMERGENCY MEDICINE

## 2017-01-01 PROCEDURE — C1769 GUIDE WIRE: HCPCS | Performed by: ORTHOPAEDIC SURGERY

## 2017-01-01 PROCEDURE — 83735 ASSAY OF MAGNESIUM: CPT | Performed by: INTERNAL MEDICINE

## 2017-01-01 PROCEDURE — 77010033678 HC OXYGEN DAILY

## 2017-01-01 PROCEDURE — 74011250636 HC RX REV CODE- 250/636: Performed by: ORTHOPAEDIC SURGERY

## 2017-01-01 PROCEDURE — 36415 COLL VENOUS BLD VENIPUNCTURE: CPT | Performed by: FAMILY MEDICINE

## 2017-01-01 PROCEDURE — 85025 COMPLETE CBC W/AUTO DIFF WBC: CPT | Performed by: FAMILY MEDICINE

## 2017-01-01 PROCEDURE — 65270000029 HC RM PRIVATE

## 2017-01-01 PROCEDURE — 0QS706Z REPOSITION LEFT UPPER FEMUR WITH INTRAMEDULLARY INTERNAL FIXATION DEVICE, OPEN APPROACH: ICD-10-PCS | Performed by: ORTHOPAEDIC SURGERY

## 2017-01-01 PROCEDURE — 97535 SELF CARE MNGMENT TRAINING: CPT

## 2017-01-01 PROCEDURE — 97530 THERAPEUTIC ACTIVITIES: CPT

## 2017-01-01 PROCEDURE — 94760 N-INVAS EAR/PLS OXIMETRY 1: CPT

## 2017-01-01 PROCEDURE — 87449 NOS EACH ORGANISM AG IA: CPT | Performed by: EMERGENCY MEDICINE

## 2017-01-01 PROCEDURE — 76060000034 HC ANESTHESIA 1.5 TO 2 HR: Performed by: ORTHOPAEDIC SURGERY

## 2017-01-01 PROCEDURE — 77030020186 HC BOOT HL PROTCT SAGE -B

## 2017-01-01 PROCEDURE — 74011250636 HC RX REV CODE- 250/636

## 2017-01-01 PROCEDURE — 77030027138 HC INCENT SPIROMETER -A

## 2017-01-01 PROCEDURE — 87450 LEGIONELLA PNEUMOPHILA AG, URINE: CPT | Performed by: EMERGENCY MEDICINE

## 2017-01-01 PROCEDURE — 73502 X-RAY EXAM HIP UNI 2-3 VIEWS: CPT

## 2017-01-01 PROCEDURE — 70450 CT HEAD/BRAIN W/O DYE: CPT

## 2017-01-01 PROCEDURE — 80048 BASIC METABOLIC PNL TOTAL CA: CPT | Performed by: ORTHOPAEDIC SURGERY

## 2017-01-01 PROCEDURE — 76010000153 HC OR TIME 1.5 TO 2 HR: Performed by: ORTHOPAEDIC SURGERY

## 2017-01-01 PROCEDURE — 83735 ASSAY OF MAGNESIUM: CPT | Performed by: EMERGENCY MEDICINE

## 2017-01-01 PROCEDURE — 87070 CULTURE OTHR SPECIMN AEROBIC: CPT | Performed by: EMERGENCY MEDICINE

## 2017-01-01 PROCEDURE — P9016 RBC LEUKOCYTES REDUCED: HCPCS

## 2017-01-01 PROCEDURE — 74011250636 HC RX REV CODE- 250/636: Performed by: EMERGENCY MEDICINE

## 2017-01-01 PROCEDURE — 36430 TRANSFUSION BLD/BLD COMPNT: CPT

## 2017-01-01 PROCEDURE — 86900 BLOOD TYPING SEROLOGIC ABO: CPT

## 2017-01-01 PROCEDURE — 77010033678 HC OXYGEN DAILY: Performed by: INTERNAL MEDICINE

## 2017-01-01 PROCEDURE — 76210000006 HC OR PH I REC 0.5 TO 1 HR: Performed by: ORTHOPAEDIC SURGERY

## 2017-01-01 PROCEDURE — 74011250637 HC RX REV CODE- 250/637: Performed by: ORTHOPAEDIC SURGERY

## 2017-01-01 PROCEDURE — 71010 XR CHEST PORT: CPT

## 2017-01-01 PROCEDURE — 85018 HEMOGLOBIN: CPT | Performed by: ORTHOPAEDIC SURGERY

## 2017-01-01 PROCEDURE — 74011000258 HC RX REV CODE- 258: Performed by: ORTHOPAEDIC SURGERY

## 2017-01-01 PROCEDURE — 85014 HEMATOCRIT: CPT | Performed by: FAMILY MEDICINE

## 2017-01-01 PROCEDURE — 77030003029 HC SUT VCRL J&J -B: Performed by: ORTHOPAEDIC SURGERY

## 2017-01-01 PROCEDURE — 80053 COMPREHEN METABOLIC PANEL: CPT | Performed by: EMERGENCY MEDICINE

## 2017-01-01 PROCEDURE — 81001 URINALYSIS AUTO W/SCOPE: CPT | Performed by: EMERGENCY MEDICINE

## 2017-01-01 PROCEDURE — 77030008467 HC STPLR SKN COVD -B: Performed by: ORTHOPAEDIC SURGERY

## 2017-01-01 PROCEDURE — G8998 SWALLOW D/C STATUS: HCPCS

## 2017-01-01 PROCEDURE — 51798 US URINE CAPACITY MEASURE: CPT

## 2017-01-01 PROCEDURE — C1713 ANCHOR/SCREW BN/BN,TIS/BN: HCPCS | Performed by: ORTHOPAEDIC SURGERY

## 2017-01-01 PROCEDURE — 97166 OT EVAL MOD COMPLEX 45 MIN: CPT

## 2017-01-01 PROCEDURE — 87804 INFLUENZA ASSAY W/OPTIC: CPT | Performed by: EMERGENCY MEDICINE

## 2017-01-01 PROCEDURE — 77030018836 HC SOL IRR NACL ICUM -A: Performed by: ORTHOPAEDIC SURGERY

## 2017-01-01 PROCEDURE — 85018 HEMOGLOBIN: CPT | Performed by: FAMILY MEDICINE

## 2017-01-01 PROCEDURE — 36415 COLL VENOUS BLD VENIPUNCTURE: CPT | Performed by: INTERNAL MEDICINE

## 2017-01-01 PROCEDURE — 77030002934 HC SUT MCRYL J&J -B: Performed by: ORTHOPAEDIC SURGERY

## 2017-01-01 PROCEDURE — 82550 ASSAY OF CK (CPK): CPT | Performed by: EMERGENCY MEDICINE

## 2017-01-01 PROCEDURE — 77030010509 HC AIRWY LMA MSK TELE -A: Performed by: NURSE ANESTHETIST, CERTIFIED REGISTERED

## 2017-01-01 PROCEDURE — 85025 COMPLETE CBC W/AUTO DIFF WBC: CPT | Performed by: INTERNAL MEDICINE

## 2017-01-01 PROCEDURE — 76450000000

## 2017-01-01 PROCEDURE — 86920 COMPATIBILITY TEST SPIN: CPT | Performed by: EMERGENCY MEDICINE

## 2017-01-01 PROCEDURE — 74011250636 HC RX REV CODE- 250/636: Performed by: FAMILY MEDICINE

## 2017-01-01 PROCEDURE — 80048 BASIC METABOLIC PNL TOTAL CA: CPT | Performed by: INTERNAL MEDICINE

## 2017-01-01 PROCEDURE — 97116 GAIT TRAINING THERAPY: CPT

## 2017-01-01 PROCEDURE — 97110 THERAPEUTIC EXERCISES: CPT

## 2017-01-01 PROCEDURE — 96368 THER/DIAG CONCURRENT INF: CPT

## 2017-01-01 PROCEDURE — 81001 URINALYSIS AUTO W/SCOPE: CPT

## 2017-01-01 PROCEDURE — 74011250637 HC RX REV CODE- 250/637: Performed by: NURSE PRACTITIONER

## 2017-01-01 PROCEDURE — 80048 BASIC METABOLIC PNL TOTAL CA: CPT | Performed by: EMERGENCY MEDICINE

## 2017-01-01 PROCEDURE — 97161 PT EVAL LOW COMPLEX 20 MIN: CPT

## 2017-01-01 PROCEDURE — 97164 PT RE-EVAL EST PLAN CARE: CPT

## 2017-01-01 PROCEDURE — 85027 COMPLETE CBC AUTOMATED: CPT | Performed by: FAMILY MEDICINE

## 2017-01-01 PROCEDURE — 74230 X-RAY XM SWLNG FUNCJ C+: CPT

## 2017-01-01 PROCEDURE — 36415 COLL VENOUS BLD VENIPUNCTURE: CPT | Performed by: ORTHOPAEDIC SURGERY

## 2017-01-01 PROCEDURE — 74011000272 HC RX REV CODE- 272: Performed by: ORTHOPAEDIC SURGERY

## 2017-01-01 PROCEDURE — 86900 BLOOD TYPING SEROLOGIC ABO: CPT | Performed by: EMERGENCY MEDICINE

## 2017-01-01 PROCEDURE — 83605 ASSAY OF LACTIC ACID: CPT

## 2017-01-01 PROCEDURE — 92611 MOTION FLUOROSCOPY/SWALLOW: CPT

## 2017-01-01 PROCEDURE — 77030014405 HC GD ROD RMR SYNT -C: Performed by: ORTHOPAEDIC SURGERY

## 2017-01-01 PROCEDURE — 87040 BLOOD CULTURE FOR BACTERIA: CPT | Performed by: EMERGENCY MEDICINE

## 2017-01-01 PROCEDURE — 36415 COLL VENOUS BLD VENIPUNCTURE: CPT | Performed by: EMERGENCY MEDICINE

## 2017-01-01 PROCEDURE — 77030013079 HC BLNKT BAIR HGGR 3M -A: Performed by: ANESTHESIOLOGY

## 2017-01-01 PROCEDURE — 93308 TTE F-UP OR LMTD: CPT

## 2017-01-01 PROCEDURE — 30233P1 TRANSFUSION OF NONAUTOLOGOUS FROZEN RED CELLS INTO PERIPHERAL VEIN, PERCUTANEOUS APPROACH: ICD-10-PCS | Performed by: INTERNAL MEDICINE

## 2017-01-01 PROCEDURE — P9016 RBC LEUKOCYTES REDUCED: HCPCS | Performed by: EMERGENCY MEDICINE

## 2017-01-01 PROCEDURE — 74011250637 HC RX REV CODE- 250/637: Performed by: EMERGENCY MEDICINE

## 2017-01-01 PROCEDURE — 74011000258 HC RX REV CODE- 258: Performed by: EMERGENCY MEDICINE

## 2017-01-01 PROCEDURE — 96365 THER/PROPH/DIAG IV INF INIT: CPT

## 2017-01-01 PROCEDURE — 99285 EMERGENCY DEPT VISIT HI MDM: CPT

## 2017-01-01 PROCEDURE — 92610 EVALUATE SWALLOWING FUNCTION: CPT

## 2017-01-01 PROCEDURE — 86920 COMPATIBILITY TEST SPIN: CPT

## 2017-01-01 PROCEDURE — 77030020782 HC GWN BAIR PAWS FLX 3M -B: Performed by: ORTHOPAEDIC SURGERY

## 2017-01-01 PROCEDURE — 77030011640 HC PAD GRND REM COVD -A: Performed by: ORTHOPAEDIC SURGERY

## 2017-01-01 PROCEDURE — G8996 SWALLOW CURRENT STATUS: HCPCS

## 2017-01-01 PROCEDURE — 74011250636 HC RX REV CODE- 250/636: Performed by: HOSPITALIST

## 2017-01-01 PROCEDURE — 80202 ASSAY OF VANCOMYCIN: CPT | Performed by: EMERGENCY MEDICINE

## 2017-01-01 PROCEDURE — 96360 HYDRATION IV INFUSION INIT: CPT

## 2017-01-01 PROCEDURE — 77030011943

## 2017-01-01 PROCEDURE — 80048 BASIC METABOLIC PNL TOTAL CA: CPT | Performed by: FAMILY MEDICINE

## 2017-01-01 PROCEDURE — 77030000031 HC BIT DRL QC SYNT -C: Performed by: ORTHOPAEDIC SURGERY

## 2017-01-01 PROCEDURE — 74011000255 HC RX REV CODE- 255: Performed by: FAMILY MEDICINE

## 2017-01-01 PROCEDURE — 80202 ASSAY OF VANCOMYCIN: CPT | Performed by: INTERNAL MEDICINE

## 2017-01-01 PROCEDURE — 93971 EXTREMITY STUDY: CPT

## 2017-01-01 PROCEDURE — 82272 OCCULT BLD FECES 1-3 TESTS: CPT | Performed by: HOSPITALIST

## 2017-01-01 PROCEDURE — 74011000250 HC RX REV CODE- 250

## 2017-01-01 PROCEDURE — 73560 X-RAY EXAM OF KNEE 1 OR 2: CPT

## 2017-01-01 PROCEDURE — 71020 XR CHEST PA LAT: CPT

## 2017-01-01 DEVICE — SCREW BNE L90MM DIA10.35MM PROX FEM G TI CANN FOR TFN ADV: Type: IMPLANTABLE DEVICE | Site: FEMUR | Status: FUNCTIONAL

## 2017-01-01 DEVICE — SCREW BNE L34MM DIA5MM NONSTERILE TIB LT GRN TI ST CANN LOK: Type: IMPLANTABLE DEVICE | Site: FEMUR | Status: FUNCTIONAL

## 2017-01-01 DEVICE — NAIL IM L340MM DIA10MM 130DEG LNG L PROX FEM GRN TI CANN: Type: IMPLANTABLE DEVICE | Site: FEMUR | Status: FUNCTIONAL

## 2017-01-01 RX ORDER — HYOSCYAMINE SULFATE 0.12 MG/1
0.12 TABLET SUBLINGUAL
Qty: 30 TAB | Refills: 1 | Status: SHIPPED | OUTPATIENT
Start: 2017-01-01

## 2017-01-01 RX ORDER — SCOLOPAMINE TRANSDERMAL SYSTEM 1 MG/1
1.5 PATCH, EXTENDED RELEASE TRANSDERMAL
Qty: 10 PATCH | Refills: 1 | Status: SHIPPED | OUTPATIENT
Start: 2017-01-01

## 2017-01-01 RX ORDER — POLYETHYLENE GLYCOL 3350 17 G/17G
17 POWDER, FOR SOLUTION ORAL DAILY
Status: DISCONTINUED | OUTPATIENT
Start: 2017-01-01 | End: 2017-01-01

## 2017-01-01 RX ORDER — SODIUM CHLORIDE 9 MG/ML
50 INJECTION, SOLUTION INTRAVENOUS CONTINUOUS
Status: DISCONTINUED | OUTPATIENT
Start: 2017-01-01 | End: 2017-01-01

## 2017-01-01 RX ORDER — GLYCOPYRROLATE 0.2 MG/ML
INJECTION INTRAMUSCULAR; INTRAVENOUS AS NEEDED
Status: DISCONTINUED | OUTPATIENT
Start: 2017-01-01 | End: 2017-01-01 | Stop reason: HOSPADM

## 2017-01-01 RX ORDER — MORPHINE SULFATE 2 MG/ML
2 INJECTION, SOLUTION INTRAMUSCULAR; INTRAVENOUS
Status: DISCONTINUED | OUTPATIENT
Start: 2017-01-01 | End: 2017-01-01

## 2017-01-01 RX ORDER — BISACODYL 5 MG
10 TABLET, DELAYED RELEASE (ENTERIC COATED) ORAL DAILY PRN
Status: DISCONTINUED | OUTPATIENT
Start: 2017-01-01 | End: 2017-01-01 | Stop reason: HOSPADM

## 2017-01-01 RX ORDER — ACETAMINOPHEN 325 MG/1
650 TABLET ORAL
Status: DISCONTINUED | OUTPATIENT
Start: 2017-01-01 | End: 2017-01-01

## 2017-01-01 RX ORDER — ACETAMINOPHEN 650 MG/1
650 SUPPOSITORY RECTAL
Status: DISCONTINUED | OUTPATIENT
Start: 2017-01-01 | End: 2017-01-01 | Stop reason: HOSPADM

## 2017-01-01 RX ORDER — FAMOTIDINE 20 MG/1
20 TABLET, FILM COATED ORAL ONCE
Status: DISCONTINUED | OUTPATIENT
Start: 2017-01-01 | End: 2017-01-01 | Stop reason: HOSPADM

## 2017-01-01 RX ORDER — SCOLOPAMINE TRANSDERMAL SYSTEM 1 MG/1
1.5 PATCH, EXTENDED RELEASE TRANSDERMAL
Status: DISCONTINUED | OUTPATIENT
Start: 2017-01-01 | End: 2017-01-01 | Stop reason: HOSPADM

## 2017-01-01 RX ORDER — HYDROMORPHONE HYDROCHLORIDE 2 MG/ML
0.5 INJECTION, SOLUTION INTRAMUSCULAR; INTRAVENOUS; SUBCUTANEOUS
Status: DISCONTINUED | OUTPATIENT
Start: 2017-01-01 | End: 2017-01-01 | Stop reason: HOSPADM

## 2017-01-01 RX ORDER — HEPARIN SODIUM 5000 [USP'U]/ML
5000 INJECTION, SOLUTION INTRAVENOUS; SUBCUTANEOUS EVERY 12 HOURS
Status: DISCONTINUED | OUTPATIENT
Start: 2017-01-01 | End: 2017-01-01

## 2017-01-01 RX ORDER — LIDOCAINE HYDROCHLORIDE 20 MG/ML
INJECTION, SOLUTION EPIDURAL; INFILTRATION; INTRACAUDAL; PERINEURAL AS NEEDED
Status: DISCONTINUED | OUTPATIENT
Start: 2017-01-01 | End: 2017-01-01 | Stop reason: HOSPADM

## 2017-01-01 RX ORDER — HEPARIN SODIUM 5000 [USP'U]/ML
5000 INJECTION, SOLUTION INTRAVENOUS; SUBCUTANEOUS EVERY 8 HOURS
Status: DISCONTINUED | OUTPATIENT
Start: 2017-01-01 | End: 2017-01-01

## 2017-01-01 RX ORDER — CYCLOBENZAPRINE HCL 5 MG
5 TABLET ORAL
Qty: 15 TAB | Refills: 0 | Status: SHIPPED | OUTPATIENT
Start: 2017-01-01 | End: 2017-01-01

## 2017-01-01 RX ORDER — SODIUM CHLORIDE 0.9 % (FLUSH) 0.9 %
5-10 SYRINGE (ML) INJECTION EVERY 8 HOURS
Status: DISCONTINUED | OUTPATIENT
Start: 2017-01-01 | End: 2017-01-01 | Stop reason: HOSPADM

## 2017-01-01 RX ORDER — CEFAZOLIN SODIUM 2 G/50ML
2 SOLUTION INTRAVENOUS ONCE
Status: DISCONTINUED | OUTPATIENT
Start: 2017-01-01 | End: 2017-01-01 | Stop reason: SDUPTHER

## 2017-01-01 RX ORDER — SODIUM CHLORIDE 9 MG/ML
500 INJECTION, SOLUTION INTRAVENOUS ONCE
Status: COMPLETED | OUTPATIENT
Start: 2017-01-01 | End: 2017-01-01

## 2017-01-01 RX ORDER — ACETAMINOPHEN AND CODEINE PHOSPHATE 300; 30 MG/1; MG/1
1 TABLET ORAL
Qty: 12 TAB | Refills: 0 | Status: SHIPPED | OUTPATIENT
Start: 2017-01-01 | End: 2017-01-01

## 2017-01-01 RX ORDER — MORPHINE SULFATE 100 MG/5ML
5 SOLUTION ORAL EVERY 6 HOURS
Status: DISCONTINUED | OUTPATIENT
Start: 2017-01-01 | End: 2017-01-01 | Stop reason: HOSPADM

## 2017-01-01 RX ORDER — ACETAMINOPHEN 325 MG/1
650 TABLET ORAL
Qty: 30 TAB | Refills: 0 | Status: SHIPPED | OUTPATIENT
Start: 2017-01-01 | End: 2017-01-01

## 2017-01-01 RX ORDER — ACETAMINOPHEN 650 MG/1
650 SUPPOSITORY RECTAL
Status: COMPLETED | OUTPATIENT
Start: 2017-01-01 | End: 2017-01-01

## 2017-01-01 RX ORDER — ENOXAPARIN SODIUM 100 MG/ML
30 INJECTION SUBCUTANEOUS EVERY 24 HOURS
Status: DISCONTINUED | OUTPATIENT
Start: 2017-01-01 | End: 2017-01-01

## 2017-01-01 RX ORDER — SODIUM CHLORIDE 9 MG/ML
250 INJECTION, SOLUTION INTRAVENOUS AS NEEDED
Status: DISCONTINUED | OUTPATIENT
Start: 2017-01-01 | End: 2017-01-01 | Stop reason: HOSPADM

## 2017-01-01 RX ORDER — ONDANSETRON 2 MG/ML
4 INJECTION INTRAMUSCULAR; INTRAVENOUS
Status: DISCONTINUED | OUTPATIENT
Start: 2017-01-01 | End: 2017-01-01 | Stop reason: HOSPADM

## 2017-01-01 RX ORDER — HYDROCODONE BITARTRATE AND ACETAMINOPHEN 5; 325 MG/1; MG/1
1 TABLET ORAL
Status: DISCONTINUED | OUTPATIENT
Start: 2017-01-01 | End: 2017-01-01 | Stop reason: HOSPADM

## 2017-01-01 RX ORDER — ACETAMINOPHEN 325 MG/1
TABLET ORAL
COMMUNITY
End: 2017-01-01

## 2017-01-01 RX ORDER — MORPHINE SULFATE 100 MG/5ML
5 SOLUTION ORAL
Status: DISCONTINUED | OUTPATIENT
Start: 2017-01-01 | End: 2017-01-01 | Stop reason: HOSPADM

## 2017-01-01 RX ORDER — SODIUM CHLORIDE 9 MG/ML
60 INJECTION, SOLUTION INTRAVENOUS CONTINUOUS
Status: DISCONTINUED | OUTPATIENT
Start: 2017-01-01 | End: 2017-01-01 | Stop reason: HOSPADM

## 2017-01-01 RX ORDER — ONDANSETRON 2 MG/ML
4 INJECTION INTRAMUSCULAR; INTRAVENOUS ONCE
Status: DISCONTINUED | OUTPATIENT
Start: 2017-01-01 | End: 2017-01-01 | Stop reason: HOSPADM

## 2017-01-01 RX ORDER — SODIUM CHLORIDE 0.9 % (FLUSH) 0.9 %
5-10 SYRINGE (ML) INJECTION AS NEEDED
Status: DISCONTINUED | OUTPATIENT
Start: 2017-01-01 | End: 2017-01-01 | Stop reason: HOSPADM

## 2017-01-01 RX ORDER — SODIUM CHLORIDE 0.9 % (FLUSH) 0.9 %
5-10 SYRINGE (ML) INJECTION AS NEEDED
Status: DISCONTINUED | OUTPATIENT
Start: 2017-01-01 | End: 2017-01-01

## 2017-01-01 RX ORDER — ACETAMINOPHEN 500 MG
1000 TABLET ORAL EVERY 8 HOURS
Status: DISCONTINUED | OUTPATIENT
Start: 2017-01-01 | End: 2017-01-01 | Stop reason: HOSPADM

## 2017-01-01 RX ORDER — LORAZEPAM 2 MG/ML
0.5 CONCENTRATE ORAL
Status: DISCONTINUED | OUTPATIENT
Start: 2017-01-01 | End: 2017-01-01 | Stop reason: HOSPADM

## 2017-01-01 RX ORDER — ONDANSETRON 2 MG/ML
INJECTION INTRAMUSCULAR; INTRAVENOUS AS NEEDED
Status: DISCONTINUED | OUTPATIENT
Start: 2017-01-01 | End: 2017-01-01 | Stop reason: HOSPADM

## 2017-01-01 RX ORDER — LEVOFLOXACIN 5 MG/ML
750 INJECTION, SOLUTION INTRAVENOUS EVERY 24 HOURS
Status: DISCONTINUED | OUTPATIENT
Start: 2017-01-01 | End: 2017-01-01

## 2017-01-01 RX ORDER — BISACODYL 5 MG
10 TABLET, DELAYED RELEASE (ENTERIC COATED) ORAL
Status: DISCONTINUED | OUTPATIENT
Start: 2017-01-01 | End: 2017-01-01

## 2017-01-01 RX ORDER — FACIAL-BODY WIPES
10 EACH TOPICAL DAILY PRN
Status: DISCONTINUED | OUTPATIENT
Start: 2017-01-01 | End: 2017-01-01 | Stop reason: HOSPADM

## 2017-01-01 RX ORDER — LEVOFLOXACIN 5 MG/ML
500 INJECTION, SOLUTION INTRAVENOUS
Status: DISCONTINUED | OUTPATIENT
Start: 2017-01-01 | End: 2017-01-01

## 2017-01-01 RX ORDER — SODIUM CHLORIDE, SODIUM LACTATE, POTASSIUM CHLORIDE, CALCIUM CHLORIDE 600; 310; 30; 20 MG/100ML; MG/100ML; MG/100ML; MG/100ML
50 INJECTION, SOLUTION INTRAVENOUS CONTINUOUS
Status: DISCONTINUED | OUTPATIENT
Start: 2017-01-01 | End: 2017-01-01 | Stop reason: HOSPADM

## 2017-01-01 RX ORDER — BISACODYL 5 MG
10 TABLET, DELAYED RELEASE (ENTERIC COATED) ORAL
Qty: 30 TAB | Refills: 0 | Status: SHIPPED | OUTPATIENT
Start: 2017-01-01 | End: 2017-01-01

## 2017-01-01 RX ORDER — MELOXICAM 7.5 MG/1
TABLET ORAL DAILY
COMMUNITY
End: 2017-01-01

## 2017-01-01 RX ORDER — LIDOCAINE HYDROCHLORIDE 10 MG/ML
0.1 INJECTION, SOLUTION EPIDURAL; INFILTRATION; INTRACAUDAL; PERINEURAL AS NEEDED
Status: DISCONTINUED | OUTPATIENT
Start: 2017-01-01 | End: 2017-01-01 | Stop reason: HOSPADM

## 2017-01-01 RX ORDER — HYOSCYAMINE SULFATE 0.12 MG/1
0.12 TABLET SUBLINGUAL
Status: DISCONTINUED | OUTPATIENT
Start: 2017-01-01 | End: 2017-01-01 | Stop reason: HOSPADM

## 2017-01-01 RX ORDER — DEXTROSE, SODIUM CHLORIDE, AND POTASSIUM CHLORIDE 5; .9; .15 G/100ML; G/100ML; G/100ML
100 INJECTION INTRAVENOUS CONTINUOUS
Status: DISCONTINUED | OUTPATIENT
Start: 2017-01-01 | End: 2017-01-01

## 2017-01-01 RX ORDER — FENTANYL CITRATE 50 UG/ML
INJECTION, SOLUTION INTRAMUSCULAR; INTRAVENOUS AS NEEDED
Status: DISCONTINUED | OUTPATIENT
Start: 2017-01-01 | End: 2017-01-01 | Stop reason: HOSPADM

## 2017-01-01 RX ORDER — HYDROCODONE BITARTRATE AND ACETAMINOPHEN 5; 325 MG/1; MG/1
1 TABLET ORAL
Qty: 20 TAB | Refills: 0 | Status: SHIPPED | OUTPATIENT
Start: 2017-01-01 | End: 2017-01-01

## 2017-01-01 RX ORDER — ETOMIDATE 2 MG/ML
INJECTION INTRAVENOUS AS NEEDED
Status: DISCONTINUED | OUTPATIENT
Start: 2017-01-01 | End: 2017-01-01 | Stop reason: HOSPADM

## 2017-01-01 RX ORDER — SODIUM CHLORIDE 9 MG/ML
60 INJECTION, SOLUTION INTRAVENOUS CONTINUOUS
Status: DISCONTINUED | OUTPATIENT
Start: 2017-01-01 | End: 2017-01-01

## 2017-01-01 RX ORDER — SODIUM CHLORIDE 9 MG/ML
75 INJECTION, SOLUTION INTRAVENOUS CONTINUOUS
Status: DISCONTINUED | OUTPATIENT
Start: 2017-01-01 | End: 2017-01-01

## 2017-01-01 RX ORDER — LEVOFLOXACIN 5 MG/ML
750 INJECTION, SOLUTION INTRAVENOUS
Status: DISCONTINUED | OUTPATIENT
Start: 2017-01-01 | End: 2017-01-01

## 2017-01-01 RX ORDER — NALOXONE HYDROCHLORIDE 0.4 MG/ML
0.4 INJECTION, SOLUTION INTRAMUSCULAR; INTRAVENOUS; SUBCUTANEOUS AS NEEDED
Status: DISCONTINUED | OUTPATIENT
Start: 2017-01-01 | End: 2017-01-01 | Stop reason: HOSPADM

## 2017-01-01 RX ORDER — ENOXAPARIN SODIUM 100 MG/ML
40 INJECTION SUBCUTANEOUS EVERY 24 HOURS
Status: DISCONTINUED | OUTPATIENT
Start: 2017-01-01 | End: 2017-01-01

## 2017-01-01 RX ORDER — LORAZEPAM 2 MG/ML
0.5 CONCENTRATE ORAL
Qty: 30 ML | Refills: 0 | Status: SHIPPED | OUTPATIENT
Start: 2017-01-01

## 2017-01-01 RX ORDER — MORPHINE SULFATE 100 MG/5ML
5 SOLUTION ORAL
Qty: 30 ML | Refills: 0 | Status: SHIPPED | OUTPATIENT
Start: 2017-01-01

## 2017-01-01 RX ORDER — CEFAZOLIN SODIUM IN 0.9 % NACL 2 G/100 ML
PLASTIC BAG, INJECTION (ML) INTRAVENOUS AS NEEDED
Status: DISCONTINUED | OUTPATIENT
Start: 2017-01-01 | End: 2017-01-01 | Stop reason: HOSPADM

## 2017-01-01 RX ADMIN — Medication 10 ML: at 05:38

## 2017-01-01 RX ADMIN — Medication 10 ML: at 22:10

## 2017-01-01 RX ADMIN — ACETAMINOPHEN 1000 MG: 500 TABLET, COATED ORAL at 05:54

## 2017-01-01 RX ADMIN — POLYETHYLENE GLYCOL 3350 17 G: 17 POWDER, FOR SOLUTION ORAL at 09:00

## 2017-01-01 RX ADMIN — Medication 10 ML: at 16:51

## 2017-01-01 RX ADMIN — SODIUM CHLORIDE 50 ML/HR: 900 INJECTION, SOLUTION INTRAVENOUS at 01:00

## 2017-01-01 RX ADMIN — FENTANYL CITRATE 50 MCG: 50 INJECTION, SOLUTION INTRAMUSCULAR; INTRAVENOUS at 14:15

## 2017-01-01 RX ADMIN — VANCOMYCIN HYDROCHLORIDE 750 MG: 10 INJECTION, POWDER, LYOPHILIZED, FOR SOLUTION INTRAVENOUS at 11:46

## 2017-01-01 RX ADMIN — ACETAMINOPHEN 1000 MG: 500 TABLET, COATED ORAL at 05:31

## 2017-01-01 RX ADMIN — ACETAMINOPHEN 1000 MG: 500 TABLET, COATED ORAL at 21:50

## 2017-01-01 RX ADMIN — SODIUM CHLORIDE 60 ML/HR: 900 INJECTION, SOLUTION INTRAVENOUS at 08:28

## 2017-01-01 RX ADMIN — SODIUM CHLORIDE 1000 ML: 900 INJECTION, SOLUTION INTRAVENOUS at 16:25

## 2017-01-01 RX ADMIN — HEPARIN SODIUM 5000 UNITS: 5000 INJECTION, SOLUTION INTRAVENOUS; SUBCUTANEOUS at 00:12

## 2017-01-01 RX ADMIN — Medication 10 ML: at 18:43

## 2017-01-01 RX ADMIN — ENOXAPARIN SODIUM 40 MG: 40 INJECTION SUBCUTANEOUS at 15:10

## 2017-01-01 RX ADMIN — Medication 10 ML: at 15:59

## 2017-01-01 RX ADMIN — Medication 10 ML: at 05:54

## 2017-01-01 RX ADMIN — ACETAMINOPHEN 1000 MG: 500 TABLET, COATED ORAL at 16:30

## 2017-01-01 RX ADMIN — ACETAMINOPHEN 1000 MG: 500 TABLET, COATED ORAL at 15:09

## 2017-01-01 RX ADMIN — ONDANSETRON 4 MG: 2 INJECTION INTRAMUSCULAR; INTRAVENOUS at 14:54

## 2017-01-01 RX ADMIN — WATER 2 G: 1 INJECTION INTRAMUSCULAR; INTRAVENOUS; SUBCUTANEOUS at 00:12

## 2017-01-01 RX ADMIN — Medication 10 ML: at 21:53

## 2017-01-01 RX ADMIN — FENTANYL CITRATE 50 MCG: 50 INJECTION, SOLUTION INTRAMUSCULAR; INTRAVENOUS at 13:53

## 2017-01-01 RX ADMIN — MORPHINE SULFATE 5 MG: 100 SOLUTION ORAL at 11:20

## 2017-01-01 RX ADMIN — HYDROCODONE BITARTRATE AND ACETAMINOPHEN 1 TABLET: 5; 325 TABLET ORAL at 09:40

## 2017-01-01 RX ADMIN — ACETAMINOPHEN 1000 MG: 500 TABLET, COATED ORAL at 14:00

## 2017-01-01 RX ADMIN — GLYCOPYRROLATE 0.2 MG: 0.2 INJECTION INTRAMUSCULAR; INTRAVENOUS at 14:06

## 2017-01-01 RX ADMIN — POLYETHYLENE GLYCOL 3350 17 G: 17 POWDER, FOR SOLUTION ORAL at 09:40

## 2017-01-01 RX ADMIN — ACETAMINOPHEN 1000 MG: 500 TABLET, COATED ORAL at 05:17

## 2017-01-01 RX ADMIN — MORPHINE SULFATE 5 MG: 100 SOLUTION ORAL at 06:34

## 2017-01-01 RX ADMIN — POLYETHYLENE GLYCOL 3350 17 G: 17 POWDER, FOR SOLUTION ORAL at 09:04

## 2017-01-01 RX ADMIN — Medication 10 ML: at 21:41

## 2017-01-01 RX ADMIN — Medication 10 ML: at 21:46

## 2017-01-01 RX ADMIN — ACETAMINOPHEN 1000 MG: 500 TABLET, COATED ORAL at 09:03

## 2017-01-01 RX ADMIN — Medication 10 ML: at 05:31

## 2017-01-01 RX ADMIN — SODIUM CHLORIDE 1000 MG: 900 INJECTION, SOLUTION INTRAVENOUS at 12:12

## 2017-01-01 RX ADMIN — WATER 2 G: 1 INJECTION INTRAMUSCULAR; INTRAVENOUS; SUBCUTANEOUS at 12:00

## 2017-01-01 RX ADMIN — ACETAMINOPHEN 1000 MG: 500 TABLET, COATED ORAL at 14:26

## 2017-01-01 RX ADMIN — SODIUM CHLORIDE 75 ML/HR: 900 INJECTION, SOLUTION INTRAVENOUS at 04:20

## 2017-01-01 RX ADMIN — Medication 10 ML: at 13:34

## 2017-01-01 RX ADMIN — Medication 10 ML: at 21:31

## 2017-01-01 RX ADMIN — Medication 10 ML: at 05:13

## 2017-01-01 RX ADMIN — FENTANYL CITRATE 50 MCG: 50 INJECTION, SOLUTION INTRAMUSCULAR; INTRAVENOUS at 14:00

## 2017-01-01 RX ADMIN — ACETAMINOPHEN 1000 MG: 500 TABLET, COATED ORAL at 13:34

## 2017-01-01 RX ADMIN — MORPHINE SULFATE 5 MG: 100 SOLUTION ORAL at 03:42

## 2017-01-01 RX ADMIN — Medication 10 ML: at 05:47

## 2017-01-01 RX ADMIN — ENOXAPARIN SODIUM 40 MG: 40 INJECTION SUBCUTANEOUS at 17:20

## 2017-01-01 RX ADMIN — LIDOCAINE HYDROCHLORIDE 40 MG: 20 INJECTION, SOLUTION EPIDURAL; INFILTRATION; INTRACAUDAL; PERINEURAL at 13:54

## 2017-01-01 RX ADMIN — Medication 10 ML: at 09:42

## 2017-01-01 RX ADMIN — WATER 2 G: 1 INJECTION INTRAMUSCULAR; INTRAVENOUS; SUBCUTANEOUS at 23:46

## 2017-01-01 RX ADMIN — Medication 10 ML: at 05:19

## 2017-01-01 RX ADMIN — ACETAMINOPHEN 1000 MG: 500 TABLET, COATED ORAL at 18:42

## 2017-01-01 RX ADMIN — HEPARIN SODIUM 5000 UNITS: 5000 INJECTION, SOLUTION INTRAVENOUS; SUBCUTANEOUS at 01:00

## 2017-01-01 RX ADMIN — HEPARIN SODIUM 5000 UNITS: 5000 INJECTION, SOLUTION INTRAVENOUS; SUBCUTANEOUS at 09:04

## 2017-01-01 RX ADMIN — Medication 2 G: at 14:08

## 2017-01-01 RX ADMIN — POLYETHYLENE GLYCOL 3350 17 G: 17 POWDER, FOR SOLUTION ORAL at 10:35

## 2017-01-01 RX ADMIN — ACETAMINOPHEN 1000 MG: 500 TABLET, COATED ORAL at 14:33

## 2017-01-01 RX ADMIN — ACETAMINOPHEN 1000 MG: 500 TABLET, COATED ORAL at 21:32

## 2017-01-01 RX ADMIN — ENOXAPARIN SODIUM 30 MG: 30 INJECTION SUBCUTANEOUS at 16:31

## 2017-01-01 RX ADMIN — SODIUM CHLORIDE 500 ML: 900 INJECTION, SOLUTION INTRAVENOUS at 21:48

## 2017-01-01 RX ADMIN — BARIUM SULFATE 15 ML: 400 PASTE ORAL at 14:00

## 2017-01-01 RX ADMIN — LEVOFLOXACIN 750 MG: 5 INJECTION, SOLUTION INTRAVENOUS at 12:01

## 2017-01-01 RX ADMIN — Medication 10 ML: at 09:07

## 2017-01-01 RX ADMIN — HEPARIN SODIUM 5000 UNITS: 5000 INJECTION, SOLUTION INTRAVENOUS; SUBCUTANEOUS at 17:23

## 2017-01-01 RX ADMIN — ACETAMINOPHEN 1000 MG: 500 TABLET, COATED ORAL at 21:48

## 2017-01-01 RX ADMIN — ENOXAPARIN SODIUM 40 MG: 40 INJECTION SUBCUTANEOUS at 17:21

## 2017-01-01 RX ADMIN — DEXTROSE, SODIUM CHLORIDE, AND POTASSIUM CHLORIDE 100 ML/HR: 5; .9; .15 INJECTION INTRAVENOUS at 17:42

## 2017-01-01 RX ADMIN — POLYETHYLENE GLYCOL 3350 17 G: 17 POWDER, FOR SOLUTION ORAL at 08:26

## 2017-01-01 RX ADMIN — MORPHINE SULFATE 5 MG: 100 SOLUTION ORAL at 17:32

## 2017-01-01 RX ADMIN — Medication 10 ML: at 14:00

## 2017-01-01 RX ADMIN — ACETAMINOPHEN 650 MG: 650 SUPPOSITORY RECTAL at 11:57

## 2017-01-01 RX ADMIN — Medication 10 ML: at 06:54

## 2017-01-01 RX ADMIN — SODIUM CHLORIDE 75 ML/HR: 900 INJECTION, SOLUTION INTRAVENOUS at 16:07

## 2017-01-01 RX ADMIN — CEFAZOLIN 1 G: 1 INJECTION, POWDER, FOR SOLUTION INTRAMUSCULAR; INTRAVENOUS at 09:50

## 2017-01-01 RX ADMIN — WATER 2 G: 1 INJECTION INTRAMUSCULAR; INTRAVENOUS; SUBCUTANEOUS at 11:46

## 2017-01-01 RX ADMIN — ACETAMINOPHEN 1000 MG: 500 TABLET, COATED ORAL at 21:30

## 2017-01-01 RX ADMIN — SODIUM CHLORIDE 75 ML/HR: 900 INJECTION, SOLUTION INTRAVENOUS at 12:45

## 2017-01-01 RX ADMIN — DEXTROSE, SODIUM CHLORIDE, AND POTASSIUM CHLORIDE 100 ML/HR: 5; .9; .15 INJECTION INTRAVENOUS at 07:14

## 2017-01-01 RX ADMIN — Medication 10 ML: at 16:28

## 2017-01-01 RX ADMIN — VANCOMYCIN HYDROCHLORIDE 500 MG: 500 INJECTION, POWDER, LYOPHILIZED, FOR SOLUTION INTRAVENOUS at 06:01

## 2017-01-01 RX ADMIN — MORPHINE SULFATE 5 MG: 100 SOLUTION ORAL at 12:48

## 2017-01-01 RX ADMIN — ACETAMINOPHEN 1000 MG: 500 TABLET, COATED ORAL at 05:46

## 2017-01-01 RX ADMIN — POLYETHYLENE GLYCOL 3350 17 G: 17 POWDER, FOR SOLUTION ORAL at 09:16

## 2017-01-01 RX ADMIN — ETOMIDATE 12 MG: 2 INJECTION INTRAVENOUS at 13:55

## 2017-01-01 RX ADMIN — WATER 2 G: 1 INJECTION INTRAMUSCULAR; INTRAVENOUS; SUBCUTANEOUS at 12:40

## 2017-01-01 RX ADMIN — ACETAMINOPHEN 1000 MG: 500 TABLET, COATED ORAL at 21:45

## 2017-01-01 RX ADMIN — ACETAMINOPHEN 1000 MG: 500 TABLET, COATED ORAL at 21:49

## 2017-01-01 RX ADMIN — ACETAMINOPHEN 1000 MG: 500 TABLET, COATED ORAL at 15:13

## 2017-01-01 RX ADMIN — FENTANYL CITRATE 50 MCG: 50 INJECTION, SOLUTION INTRAMUSCULAR; INTRAVENOUS at 14:30

## 2017-01-01 RX ADMIN — BARIUM SULFATE 30 G: 960 POWDER, FOR SUSPENSION ORAL at 14:00

## 2017-01-01 RX ADMIN — Medication 10 ML: at 17:24

## 2017-01-01 RX ADMIN — SODIUM CHLORIDE 1000 ML: 900 INJECTION, SOLUTION INTRAVENOUS at 11:40

## 2017-01-01 RX ADMIN — ACETAMINOPHEN 1000 MG: 500 TABLET, COATED ORAL at 17:15

## 2017-01-01 RX ADMIN — SODIUM CHLORIDE 60 ML/HR: 900 INJECTION, SOLUTION INTRAVENOUS at 05:36

## 2017-01-01 RX ADMIN — CEFAZOLIN 1 G: 1 INJECTION, POWDER, FOR SOLUTION INTRAMUSCULAR; INTRAVENOUS at 23:13

## 2017-01-01 RX ADMIN — Medication 10 ML: at 22:00

## 2017-01-01 RX ADMIN — LEVOFLOXACIN 750 MG: 5 INJECTION, SOLUTION INTRAVENOUS at 12:40

## 2017-01-01 RX ADMIN — POLYETHYLENE GLYCOL 3350 17 G: 17 POWDER, FOR SOLUTION ORAL at 09:53

## 2017-01-01 RX ADMIN — ACETAMINOPHEN 1000 MG: 500 TABLET, COATED ORAL at 15:58

## 2017-01-01 RX ADMIN — MORPHINE SULFATE 5 MG: 100 SOLUTION ORAL at 23:44

## 2017-01-01 RX ADMIN — MORPHINE SULFATE 5 MG: 100 SOLUTION ORAL at 21:38

## 2017-01-01 RX ADMIN — CEFAZOLIN 1 G: 1 INJECTION, POWDER, FOR SOLUTION INTRAMUSCULAR; INTRAVENOUS at 14:00

## 2017-01-01 RX ADMIN — Medication 10 ML: at 21:49

## 2017-01-01 RX ADMIN — WATER 2 G: 1 INJECTION INTRAMUSCULAR; INTRAVENOUS; SUBCUTANEOUS at 00:01

## 2017-01-01 RX ADMIN — SODIUM CHLORIDE 60 ML/HR: 900 INJECTION, SOLUTION INTRAVENOUS at 05:53

## 2017-06-28 NOTE — ED NOTES
Patient given copy of dc instructions and script(s). Patient verbalized understanding of instructions and script (s). Patient given a current medication reconciliation form and verbalized understanding of their medications. Patient verbalized understanding of the importance of discussing medications with  his or her physician or clinic they will be following up with. Patient alert and oriented and in no acute distress. Patient discharged home ambulatory with self and family.

## 2017-06-28 NOTE — ED NOTES
Pt. Was reassessed, she states she needs to use the bathroom again, she was assisted to the bathroom, gait is still steady, pt states she feels much better after the fluids. Pt was placed back in bed in the Position of comfort. Pt has no complaints at this time, she is asking about admission status or going home, MD was notified at this time, will continue to monitor.

## 2017-06-28 NOTE — ED NOTES
Pt. Reassessed, she states she is feeling better after half the fluid bag, MD notified and will continue to monitor.  No further orders at this time,

## 2017-06-28 NOTE — ED NOTES
Pt. Reassessed, pt is stating she feels weak, pt is requesting to use the rest room, pt was assisted to the restroom, gait at this time is steady and patient reports no lightheadedness or dizziness. MD notified and will continue to monitor.

## 2017-06-28 NOTE — ED PROVIDER NOTES
HPI Comments: 1:07 PM Jessica Mir is a 80 y.o. female with noted medical history who presents to the ED c/o an episode of syncope. Per pt's daughter, she was getting out of the car when she fell slump, woke up, and was unaware of who her daughter was. Pt states she feels fine now. Pt states she does not remember what happened. Pt denies taking any daily medication. Pt denies any other symptoms at this time. PCP: Alhaji Boucher MD      The history is provided by the patient. History reviewed. No pertinent past medical history. Past Surgical History:   Procedure Laterality Date    HX CHOLECYSTECTOMY  6/2015    HX HEMORRHOIDECTOMY      HX HERNIA REPAIR  8/2345    umbilical    HX HYSTERECTOMY           Family History:   Problem Relation Age of Onset    Heart Disease Mother     Hypertension Mother     Lung Disease Father     Stroke Neg Hx     Diabetes Neg Hx     Cancer Neg Hx        Social History     Social History    Marital status:      Spouse name: N/A    Number of children: N/A    Years of education: N/A     Occupational History    Not on file. Social History Main Topics    Smoking status: Never Smoker    Smokeless tobacco: Not on file    Alcohol use Yes      Comment: occ    Drug use: No    Sexual activity: Not on file     Other Topics Concern    Not on file     Social History Narrative         ALLERGIES: Review of patient's allergies indicates no known allergies. Review of Systems   Constitutional: Negative. HENT: Negative. Eyes: Negative. Respiratory: Negative. Cardiovascular: Negative. Gastrointestinal: Negative. Endocrine: Negative. Genitourinary: Negative. Musculoskeletal: Negative. Skin: Negative. Allergic/Immunologic: Negative. Neurological: Positive for syncope. Hematological: Negative. Psychiatric/Behavioral: Negative. All other systems reviewed and are negative.       Vitals:    06/28/17 1530 06/28/17 1600 06/28/17 1700 06/28/17 1710   BP: 146/74 169/83  160/90   Pulse: 81 75 81 75   Resp: 19 24 24 21   Temp:       SpO2: 95% 97% 94% 93%   Weight:       Height:                Physical Exam   Constitutional: She is oriented to person, place, and time. She appears well-developed and well-nourished. No distress. HENT:   Head: Normocephalic. Right Ear: External ear normal.   Left Ear: External ear normal.   Mouth/Throat: No oropharyngeal exudate. Eyes: Conjunctivae and EOM are normal. Pupils are equal, round, and reactive to light. Right eye exhibits no discharge. Left eye exhibits no discharge. No scleral icterus. Neck: Normal range of motion. Neck supple. No JVD present. No tracheal deviation present. No thyromegaly present. Cardiovascular: Normal rate, regular rhythm, normal heart sounds and intact distal pulses. Exam reveals no gallop and no friction rub. No murmur heard. Pulmonary/Chest: Effort normal and breath sounds normal. No stridor. No respiratory distress. She has no wheezes. She has no rales. She exhibits no tenderness. Abdominal: Soft. Bowel sounds are normal. She exhibits no distension and no mass. There is no tenderness. There is no rebound and no guarding. Musculoskeletal: Normal range of motion. She exhibits no edema or tenderness. Lymphadenopathy:     She has no cervical adenopathy. Neurological: She is alert and oriented to person, place, and time. She displays normal reflexes. No cranial nerve deficit. She exhibits normal muscle tone. Coordination normal.   Skin: Skin is warm and dry. No rash noted. She is not diaphoretic. No erythema. No pallor. Nursing note and vitals reviewed.        MDM  Number of Diagnoses or Management Options  Dehydration: new and requires workup  Near syncope: new and requires workup     Amount and/or Complexity of Data Reviewed  Clinical lab tests: ordered and reviewed  Tests in the radiology section of CPT®: ordered and reviewed  Discuss the patient with other providers: yes    Risk of Complications, Morbidity, and/or Mortality  Presenting problems: moderate  Diagnostic procedures: high  Management options: moderate      ED Course       Procedures      Vitals:  Patient Vitals for the past 12 hrs:   Temp Pulse Resp BP SpO2   06/28/17 1710 - 75 21 160/90 93 %   06/28/17 1700 - 81 24 - 94 %   06/28/17 1600 - 75 24 169/83 97 %   06/28/17 1530 - 81 19 146/74 95 %   06/28/17 1500 - 82 23 150/85 95 %   06/28/17 1430 - 85 23 167/84 94 %   06/28/17 1330 - 83 23 151/72 96 %   06/28/17 1300 - 72 23 169/78 95 %   06/28/17 1238 97.9 °F (36.6 °C) 68 16 165/80 98 %   06/28/17 1230 - 72 23 165/57 97 %        Medications ordered:   Medications   sodium chloride 0.9 % bolus infusion 1,000 mL (0 mL IntraVENous IV Completed 6/28/17 1725)         Lab findings:  Recent Results (from the past 12 hour(s))   URINALYSIS W/ RFLX MICROSCOPIC    Collection Time: 06/28/17 12:15 PM   Result Value Ref Range    Color YELLOW      Appearance CLOUDY      Specific gravity 1.021 1.005 - 1.030      pH (UA) 6.5 5.0 - 8.0      Protein TRACE (A) NEG mg/dL    Glucose NEGATIVE  NEG mg/dL    Ketone TRACE (A) NEG mg/dL    Bilirubin NEGATIVE  NEG      Blood NEGATIVE  NEG      Urobilinogen 1.0 0.2 - 1.0 EU/dL    Nitrites NEGATIVE  NEG      Leukocyte Esterase NEGATIVE  NEG     URINE MICROSCOPIC ONLY    Collection Time: 06/28/17 12:15 PM   Result Value Ref Range    WBC 0 to 3 0 - 4 /hpf    RBC NEGATIVE  0 - 5 /hpf    Epithelial cells 1+ 0 - 5 /lpf    Bacteria NEGATIVE  NEG /hpf   EKG, 12 LEAD, INITIAL    Collection Time: 06/28/17 12:16 PM   Result Value Ref Range    Ventricular Rate 65 BPM    Atrial Rate 65 BPM    P-R Interval 232 ms    QRS Duration 84 ms    Q-T Interval 442 ms    QTC Calculation (Bezet) 459 ms    Calculated R Axis -51 degrees    Calculated T Axis 50 degrees    Diagnosis       Sinus rhythm with marked sinus arrhythmia with 1st degree AV block with   premature atrial complexes in a pattern of bigeminy  RSR' or QR pattern in V1 suggests right ventricular conduction delay  Left anterior fascicular block  Abnormal ECG  When compared with ECG of 05-JUN-2015 08:34,  GA interval has increased  Left anterior fascicular block is now present  RSR' pattern in V1 is now present  Confirmed by Marley Headley ([phone]) on 6/28/2017 1:08:26 PM     CBC WITH AUTOMATED DIFF    Collection Time: 06/28/17 12:35 PM   Result Value Ref Range    WBC 4.5 (L) 4.6 - 13.2 K/uL    RBC 4.42 4.20 - 5.30 M/uL    HGB 13.7 12.0 - 16.0 g/dL    HCT 42.5 35.0 - 45.0 %    MCV 96.2 74.0 - 97.0 FL    MCH 31.0 24.0 - 34.0 PG    MCHC 32.2 31.0 - 37.0 g/dL    RDW 14.4 11.6 - 14.5 %    PLATELET 764 157 - 693 K/uL    MPV 9.3 9.2 - 11.8 FL    NEUTROPHILS 58 40 - 73 %    LYMPHOCYTES 29 21 - 52 %    MONOCYTES 11 (H) 3 - 10 %    EOSINOPHILS 1 0 - 5 %    BASOPHILS 1 0 - 2 %    ABS. NEUTROPHILS 2.6 1.8 - 8.0 K/UL    ABS. LYMPHOCYTES 1.3 0.9 - 3.6 K/UL    ABS. MONOCYTES 0.5 0.05 - 1.2 K/UL    ABS. EOSINOPHILS 0.0 0.0 - 0.4 K/UL    ABS. BASOPHILS 0.0 0.0 - 0.06 K/UL    DF AUTOMATED     METABOLIC PANEL, COMPREHENSIVE    Collection Time: 06/28/17 12:35 PM   Result Value Ref Range    Sodium 139 136 - 145 mmol/L    Potassium 4.6 3.5 - 5.5 mmol/L    Chloride 102 100 - 108 mmol/L    CO2 28 21 - 32 mmol/L    Anion gap 9 3.0 - 18 mmol/L    Glucose 101 (H) 74 - 99 mg/dL    BUN 30 (H) 7.0 - 18 MG/DL    Creatinine 0.96 0.6 - 1.3 MG/DL    BUN/Creatinine ratio 31 (H) 12 - 20      GFR est AA >60 >60 ml/min/1.73m2    GFR est non-AA 54 (L) >60 ml/min/1.73m2    Calcium 9.7 8.5 - 10.1 MG/DL    Bilirubin, total 0.7 0.2 - 1.0 MG/DL    ALT (SGPT) 18 13 - 56 U/L    AST (SGOT) 26 15 - 37 U/L    Alk.  phosphatase 85 45 - 117 U/L    Protein, total 8.2 6.4 - 8.2 g/dL    Albumin 4.4 3.4 - 5.0 g/dL    Globulin 3.8 2.0 - 4.0 g/dL    A-G Ratio 1.2 0.8 - 1.7     CARDIAC PANEL,(CK, CKMB & TROPONIN)    Collection Time: 06/28/17 12:35 PM   Result Value Ref Range    CK 35 26 - 192 U/L    CK - MB <1.0 <3.6 ng/ml    CK-MB Index  0.0 - 4.0 %     CALCULATION NOT PERFORMED WHEN RESULT IS BELOW LINEAR LIMIT    Troponin-I, Qt. <0.02 0.00 - 0.06 NG/ML   EKG, 12 LEAD, SUBSEQUENT    Collection Time: 06/28/17  5:49 PM   Result Value Ref Range    Ventricular Rate 87 BPM    Atrial Rate 87 BPM    P-R Interval 238 ms    QRS Duration 92 ms    Q-T Interval 386 ms    QTC Calculation (Bezet) 464 ms    Calculated P Axis 79 degrees    Calculated R Axis -58 degrees    Calculated T Axis 54 degrees    Diagnosis       Sinus rhythm with 1st degree AV block  Left axis deviation  Inferior-posterior infarct , age undetermined  Abnormal ECG  When compared with ECG of 28-JUN-2017 12:16,  premature atrial complexes are no longer present         EKG interpretation by ED Physician: sinus rhythm with 1 st degree AV block      X-Ray, CT or other radiology findings or impressions:  CT HEAD WO CONT   Final Result   IMPRESSION: Involutional changes of aging. No acute abnormalities are found. Per radiologist       Orders:  Orders Placed This Encounter    CT HEAD WO CONT     Standing Status:   Standing     Number of Occurrences:   1     Order Specific Question:   Transport     Answer:   Wheelchair [7]     Order Specific Question:   Is Patient Allergic to Contrast Dye?      Answer:   Unknown    XR CHEST PORT     Standing Status:   Standing     Number of Occurrences:   1     Order Specific Question:   Reason for Exam     Answer:   syncope    CBC WITH AUTOMATED DIFF     Standing Status:   Standing     Number of Occurrences:   1    METABOLIC PANEL, COMPREHENSIVE     Standing Status:   Standing     Number of Occurrences:   1    CARDIAC PANEL,(CK, CKMB & TROPONIN)     Standing Status:   Standing     Number of Occurrences:   1    URINALYSIS W/ RFLX MICROSCOPIC     Standing Status:   Standing     Number of Occurrences:   1    URINE MICROSCOPIC ONLY     Standing Status:   Standing     Number of Occurrences:   1    CARDIAC/RESPIRATORY MONITORING Standing Status:   Standing     Number of Occurrences:   1    VITAL SIGNS     Standing Status:   Standing     Number of Occurrences:   1    EKG, 12 LEAD, INITIAL     Standing Status:   Standing     Number of Occurrences:   1     Order Specific Question:   Reason for Exam:     Answer:   Pain    EKG, 12 LEAD, SUBSEQUENT     Standing Status:   Standing     Number of Occurrences:   1     Order Specific Question:   Reason for Exam:     Answer:   (?) new onset atria fib    INSERT PERIPHERAL IV ONE TIME STAT     Standing Status:   Standing     Number of Occurrences:   1    meloxicam (MOBIC) 7.5 mg tablet     Sig: Take  by mouth daily.  sodium chloride 0.9 % bolus infusion 1,000 mL       Reevaluation, Progress notes, Consult notes, or additional Procedure notes:   6:19 PM Spoke with Dr. Octavio Stanley. States patient can be discharged home and will see patient in the morning. 6:28 PM Spoke with family and told them the patient has a possible right upper lobe nodule in right lung. Family understands the seriousness of this and will follow up with patient's PCP to set up out patient CT for further evaluation. Disposition:  Diagnosis:   1. Dehydration    2. Persistent atrial fibrillation (HCC)        Disposition: Discharged    Follow-up Information     Follow up With Details Comments Mehran Finnegan MD In 1 day See in office tomorrow. [address] [facility] [address] [facility]             Patient's Medications   Start Taking    No medications on file   Continue Taking    LATANOPROST (XALATAN) 0.005 % OPHTHALMIC SOLUTION        MELOXICAM (MOBIC) 7.5 MG TABLET    Take  by mouth daily. SIMVASTATIN (ZOCOR) 20 MG TABLET        VITAMIN A-VITAMIN C-VITAMIN E (ANTIOXIDANT) CAP    Take  by mouth.    These Medications have changed    No medications on file   Stop Taking    No medications on file         Scribe Attestation  Dannie Flor scribing for and in the presence of Jo Orellana, MD (06/28/17)      Physician Attestation  I personally performed the services described in this documentation, reviewed and edited the documentation which was dictated to the scribe in my presence, and it accurately records my words and actions. Zainab Burgess MD (06/28/17)      Signed by: Ron Saravia, June 28, 2017 at 1:10 PM     Scribe Attestation:   I, Kaiden Turner, scribing for and in the presence of Zainab Burgess MD June 28, 2017 at [facility]     Physician Attestation:   I personally performed the services described in this documentation, reviewed and edited the documentation which was dictated to the scribe in my presence, and it accurately records my words and actions.  Zainab Burgess MD  June 28, 2017 at 6:20 PM    Signed by: Ron Alfonso, June 28, 2017 at 6:20 PM

## 2017-08-01 PROBLEM — I35.0 AORTIC STENOSIS, MODERATE: Status: ACTIVE | Noted: 2017-01-01

## 2017-08-01 NOTE — PROGRESS NOTES
HISTORY OF PRESENT ILLNESS  Alexandrea Mary is a 80 y.o. female. HPI  She was referred to my office for further evaluation of syncope and severe aortic stenosis by an echocardiogram report at Dr. Glenroy aguilera. She is a very young-looking, [de-identified] year-old female who remains to be quite alert and intelligent. She has had no symptoms of chest pain, dyspnea, orthopnea or PND. She has had no palpitations or frequent dizziness. She has had no symptoms to indicate TIA or amaurosis fugax. She apparently had an episode of syncope one month ago while standing. She suddenly dropped to the floor and lost consciousness for a few seconds. There is no sequela. She apparently had another episode of syncope on 06/28/2017 while sitting in the car. She apparently slumped and woke up on her own, but she was unaware of who her daughter was. This lasted for only a few seconds. There was no neurological sequela. There was no witnessed seizure, tongue biting, urinary or bowel incontinence. There was no preceding dizziness or palpitations. She has no recollection of nausea, urge to have a bowel movement or diaphoresis. She had an echocardiogram in 2010, which was interpreted as mild aortic stenosis and normal LV function. She had a repeat echocardiogram at Dr. Glenroy aguilera on 07/03/2017, which was apparently interpreted as severe aortic stenosis. There was normal LV function and moderate concentric left ventricular hypertrophy. The peak gradient was reported as 47 mmHg and the mean gradient across the aortic valve was 20.9 mmHg. The aortic valve area was not calculated. The  suggested that aortic valve replacement was indicated. She is a nonsmoker. She has no history of hypertension or diabetes mellitus. She is not on any medication to precipitate hypotension or bradycardia. She has been on simvastatin for hypercholesterolemia.   She has no family history of atherosclerotic coronary artery disease. Review of Systems   Constitutional: Negative for malaise/fatigue and weight loss. HENT: Negative for hearing loss. Eyes: Negative for blurred vision and double vision. Respiratory: Negative for shortness of breath. Cardiovascular: Negative for chest pain, palpitations, orthopnea, claudication, leg swelling and PND. Gastrointestinal: Negative for blood in stool, heartburn and melena. Genitourinary: Negative for dysuria, frequency, hematuria and urgency. Musculoskeletal: Positive for back pain. Negative for joint pain. Skin: Negative for itching and rash. Neurological: Positive for loss of consciousness. Negative for dizziness, weakness and headaches. Psychiatric/Behavioral: Positive for memory loss. Negative for depression. Physical Exam   Constitutional: She is oriented to person, place, and time. She appears well-developed and well-nourished. HENT:   Head: Normocephalic and atraumatic. Eyes: Conjunctivae are normal. Pupils are equal, round, and reactive to light. Neck: Normal range of motion. Neck supple. No JVD present. Cardiovascular: Normal rate, regular rhythm, S1 normal and S2 normal.   No extrasystoles are present. PMI is not displaced. Exam reveals no gallop and no friction rub. Murmur heard. Harsh midsystolic murmur is present with a grade of 2/6  at the upper right sternal border radiating to the neck  Pulses:       Carotid pulses are 3+ on the right side with bruit, and 3+ on the left side with bruit. Pulmonary/Chest: Effort normal. She has no rales. Abdominal: Soft. There is no tenderness. Musculoskeletal: She exhibits no edema. Neurological: She is alert and oriented to person, place, and time. No cranial nerve deficit. Skin: Skin is warm and dry. Psychiatric: She has a normal mood and affect.  Her behavior is normal.     Visit Vitals    /70    Pulse 83    Ht 5' (1.524 m)    Wt 40.8 kg (90 lb)    SpO2 96%    BMI 17.58 kg/m2       History reviewed. No pertinent past medical history. Social History     Social History    Marital status:      Spouse name: N/A    Number of children: N/A    Years of education: N/A     Occupational History    Not on file. Social History Main Topics    Smoking status: Former Smoker     Packs/day: 1.00     Years: 22.00     Quit date: 8/1/1967    Smokeless tobacco: Never Used    Alcohol use Yes      Comment: occasional    Drug use: No    Sexual activity: Not on file     Other Topics Concern    Not on file     Social History Narrative       Family History   Problem Relation Age of Onset    Heart Disease Mother     Hypertension Mother     Lung Disease Father     Stroke Neg Hx     Diabetes Neg Hx     Cancer Neg Hx        Past Surgical History:   Procedure Laterality Date    HX CHOLECYSTECTOMY  6/2015    HX HEMORRHOIDECTOMY      HX HERNIA REPAIR  0/1016    umbilical    HX HYSTERECTOMY         Current Outpatient Prescriptions   Medication Sig Dispense Refill    acetaminophen (TYLENOL) 325 mg tablet Take  by mouth every four (4) hours as needed for Pain. EKG: unchanged from previous tracings, normal sinus rhythm, left axis deviation, upper limit [place],incomplete RBBB  . ASSESSMENT and PLAN  Encounter Diagnoses   Name Primary?  Syncope and collapse Yes    Aortic stenosis, moderate    The cause of this patients syncope is not quite clear at this time. It does not appear to be vasovagal syncope since it was not associated with nausea or diaphoresis. The first episode could possibly be related to orthostatic hypotension with dehydration. However, the second episode occurred while she was sitting in the backseat and it appears unlikely that she orthostatic hypotension in a sitting position. She did not have any prodromal symptoms such as dizziness or palpitations. She does not have any medication to precipitate bradycardia or hypotension.   There is a possibility of vertebrobasilar artery insufficiency related drop attack or atypical seizure activities. Her echocardiogram at Dr. Jamey Flowers office was apparently interpreted as severe aortic stenosis and aortic valve replacement was suggested. However, she does not have physical findings of severe aortic stenosis. Her murmur characteristics do not appear to be a case of severe aortic stenosis and she does have very intact second heart sound  indicating less than critical aortic stenosis. The echocardiogram itself indicated a peak pressure gradient of 47 mmHg and a mean gradient of 20 mmHg with normal left ventricular systolic function, which would not indicate critical aortic stenosis by any means. It does not appear to be a case of low pressure gradient severe aortic stenosis, as she has had no other symptoms whatsoever related to severe aortic stenosis. Although her echocardiogram indicated moderate concentric left ventricular hypertrophy, she shows no evidence of left ventricular hypertrophy on surface electrocardiogram.      This patients aortic stenosis is moderately severe, at most, at this time and is not the cause of her syncope. There is no indication for valve replacement at this time. We discussed the implantable loop recorder to determine any possibility of intermittent bradycardia. We agreed upon waiting and seeing how she does in the future and consider further diagnostic workup, such as an implantable loop recorder, if she has recurrent episodes of syncope.

## 2017-08-01 NOTE — PROGRESS NOTES
1. Have you been to the ER, urgent care clinic since your last visit? Hospitalized since your last visit? ER 6/28/17 syncope  2. Have you seen or consulted any other health care providers outside of the [address] [place] [address] since your last visit? Include any pap smears or colon screening.  Ref by PCP

## 2017-08-01 NOTE — MR AVS SNAPSHOT
Visit Information Date & Time Provider Department Dept. Phone Encounter #  
 8/1/2017 11:20 AM Felicity Jackson MD [facility] [id number] Upcoming Health Maintenance Date Due DTaP/Tdap/Td series (1 - Tdap) 4/3/1946 ZOSTER VACCINE AGE 60> 2/3/1985 GLAUCOMA SCREENING Q2Y 4/3/1990 Pneumococcal 65+ Low/Medium Risk (1 of 2 - PCV13) 4/3/1990 MEDICARE YEARLY EXAM 4/3/1990 INFLUENZA AGE 9 TO ADULT 8/1/2017 Allergies as of 8/1/2017  Review Complete On: 8/1/2017 By: Felicity Jackson MD  
 Not on File Current Immunizations  Never Reviewed No immunizations on file. Not reviewed this visit You Were Diagnosed With   
  
 Codes Comments Syncope and collapse    -  Primary ICD-10-CM: R55 
ICD-9-CM: 780. 2 Vitals BP Pulse Height(growth percentile) Weight(growth percentile) SpO2 BMI  
 130/70 83 5' (1.524 m) 90 lb (40.8 kg) 96% 17.58 kg/m2 OB Status Smoking Status Hysterectomy Former Smoker Vitals History BMI and BSA Data Body Mass Index Body Surface Area  
 17.58 kg/m 2 1.31 m 2 Your Updated Medication List  
  
   
This list is accurate as of: 8/1/17 12:38 PM.  Always use your most recent med list.  
  
  
  
  
 TYLENOL 325 mg tablet Generic drug:  acetaminophen Take  by mouth every four (4) hours as needed for Pain. We Performed the Following AMB POC EKG ROUTINE W/ 12 LEADS, INTER & REP [35761 CPT(R)] Patient Instructions Continue current medications. No changes. If you have any further questions or concerns, please contact our office. [phone] Introducing [facility]! Carlos Granados introduces [facility] patient portal. Now you can access parts of your medical record, email your doctor's office, and request medication refills online. 1. In your internet browser, go to [email or web address] 2. Click on the First Time User? Click Here link in the Sign In box. You will see the New Member Sign Up page. 3. Enter your [employer] Access Code exactly as it appears below. You will not need to use this code after youve completed the sign-up process. If you do not sign up before the expiration date, you must request a new code. · [employer] Access Code: [email or web address] Expires: 9/12/2017  3:33 PM 
 
4. Enter the last four digits of your Social Security Number (xxxx) and Date of Birth (mm/dd/yyyy) as indicated and click Submit. You will be taken to the next sign-up page. 5. Create a [employer] ID. This will be your [employer] login ID and cannot be changed, so think of one that is secure and easy to remember. 6. Create a [employer] password. You can change your password at any time. 7. Enter your Password Reset Question and Answer. This can be used at a later time if you forget your password. 8. Enter your e-mail address. You will receive e-mail notification when new information is available in [address] 9. Click Sign Up. You can now view and download portions of your medical record. 10. Click the Download Summary menu link to download a portable copy of your medical information. If you have questions, please visit the Frequently Asked Questions section of the [employer] website. Remember, [employer] is NOT to be used for urgent needs. For medical emergencies, dial 911. Now available from your iPhone and Android! Please provide this summary of care documentation to your next provider. Your primary care clinician is listed as Lidia Kinney. If you have any questions after today's visit, please call [phone].

## 2017-08-08 NOTE — ED PROVIDER NOTES
HPI Comments: Pt is a 81 yo female with PMH of aortic stenosis and syncope presents to the ED for left groin pain that began today around 1300 while pt was eating lunch. Pt denies any trauma today, but did have a fall one month ago. Pain is intermittent, 6/10, no exacerbating factors. Pt has tried tylenol without relief. Per daughter she said her mother reported radiation down the left leg today, however pt is currently denying. Pt denies fever, chills, HA, dizziness, chest pain, SOB, abd pain, N/V/D/C, dysuria, hematuria, frequency, numbness, tingling, weakness, decreased ROM, or gait changes. Patient is a 80 y.o. female presenting with groin pain. The history is provided by the patient and a relative. Groin Pain   Pertinent negatives include no chest pain, no abdominal pain, no headaches and no shortness of breath. Past Medical History:   Diagnosis Date    Ill-defined condition     synope       Past Surgical History:   Procedure Laterality Date    HX CHOLECYSTECTOMY  6/2015    HX HEMORRHOIDECTOMY      HX HERNIA REPAIR  0/8667    umbilical    HX HYSTERECTOMY           Family History:   Problem Relation Age of Onset    Heart Disease Mother     Hypertension Mother     Lung Disease Father     Stroke Neg Hx     Diabetes Neg Hx     Cancer Neg Hx        Social History     Social History    Marital status:      Spouse name: N/A    Number of children: N/A    Years of education: N/A     Occupational History    Not on file. Social History Main Topics    Smoking status: Former Smoker     Packs/day: 1.00     Years: 22.00     Quit date: 8/1/1967    Smokeless tobacco: Never Used    Alcohol use Yes      Comment: occasional    Drug use: No    Sexual activity: Not on file     Other Topics Concern    Not on file     Social History Narrative         ALLERGIES: Review of patient's allergies indicates no known allergies. Review of Systems   Constitutional: Negative for chills and fever. HENT: Negative for ear pain, rhinorrhea and sore throat. Eyes: Negative for pain and redness. Respiratory: Negative for cough and shortness of breath. Cardiovascular: Negative for chest pain. Gastrointestinal: Negative for abdominal pain, constipation, diarrhea, nausea and vomiting. Genitourinary: Negative for dysuria, frequency, hematuria, vaginal bleeding, vaginal discharge and vaginal pain. Musculoskeletal: Negative for back pain, gait problem, joint swelling, myalgias and neck pain. Left groin pain   Skin: Negative. Neurological: Negative for dizziness, light-headedness and headaches. Psychiatric/Behavioral: Negative. Vitals:    08/08/17 1705   BP: 141/69   Pulse: 81   Resp: 16   Temp: 98 °F (36.7 °C)   SpO2: 97%   Weight: 40.8 kg (90 lb)   Height: 5' (1.524 m)            Physical Exam   Constitutional: She is oriented to person, place, and time. She appears well-developed and well-nourished. No distress. HENT:   Head: Normocephalic and atraumatic. Right Ear: Tympanic membrane, external ear and ear canal normal.   Left Ear: Tympanic membrane, external ear and ear canal normal.   Nose: Nose normal.   Mouth/Throat: Oropharynx is clear and moist and mucous membranes are normal. No oropharyngeal exudate. Eyes: Conjunctivae and EOM are normal. Pupils are equal, round, and reactive to light. Neck: Normal range of motion. Neck supple. Cardiovascular: Normal rate, regular rhythm, normal heart sounds and intact distal pulses. Exam reveals no gallop and no friction rub. No murmur heard. Pulses:       Dorsalis pedis pulses are 2+ on the right side, and 2+ on the left side. Posterior tibial pulses are 2+ on the right side, and 2+ on the left side. Pulmonary/Chest: Effort normal and breath sounds normal. No respiratory distress. She has no wheezes. She has no rales. Abdominal: Soft. Bowel sounds are normal. She exhibits no distension. There is no tenderness.  There is no rebound and no guarding. Musculoskeletal: Normal range of motion. She exhibits no edema. Left hip: She exhibits tenderness. She exhibits normal range of motion, normal strength, no bony tenderness, no swelling, no crepitus, no deformity and no laceration. Left knee: Normal.        Left ankle: Normal. She exhibits no swelling. Left upper leg: Normal.        Left lower leg: Normal.        Legs:       Left foot: Normal.   No palpable inguinal hernia   Neurological: She is alert and oriented to person, place, and time. Skin: Skin is warm and dry. She is not diaphoretic. No erythema. Psychiatric: She has a normal mood and affect. Her behavior is normal. Judgment and thought content normal.   Nursing note and vitals reviewed. MDM  Number of Diagnoses or Management Options  Diagnosis management comments: DIFFERENTIAL DIAGNOSES:  Fracture, dislocation, sprain, strain, tendonitis, Infection/ septic joint, DJD, RA, hemarthrosis, gout, pseudogout, inflammatory effusion, UTI, hernia, DVT. Some bradycardia in ED noted, EKG performed, Sinus jazzmine at 51 with 1st degree AV block, which is c/w previous EKG. Pt denies any dizziness, lightheadedness, CP, palpitations, SOB. PVL Left leg :  1. Deep vein(s) visualized include the common femoral, proximal  femoral, mid femoral, distal femoral, popliteal(above knee),  popliteal(fossa), popliteal(below knee), posterior tibial and peroneal   veins. 2. No evidence of deep venous thrombosis detected in the veins  visualized. 3. No evidence of deep vein thrombosis in the contralateral common  femoral vein. 4. Superficial vein(s) visualized include the great saphenous vein.     Left hip xr reviewed, no acute fracture or dislocation noted. Radiology read pending. UA negative for UTI. Likely a strain of the groin. Will rx symptomatic tx and have pt follow up with her PCP and cardiology. Pt results have been reviewed with them.  They have been counseled regarding diagnosis, treatment, and plan. Pt verbally conveys understanding and agreement of the signs, symptoms, diagnosis, treatment and prognosis and additionally agrees to follow up as discussed. Pt also agrees with the care-plan and conveys that all of their questions have been answered. I have also provided discharge instructions for them that include: educational information regarding their diagnosis and treatment, and list of reasons why they would want to return to the ED prior to their follow-up appointment, should their condition change. Bailey Epstein PA-C 7:09 PM        Amount and/or Complexity of Data Reviewed  Tests in the radiology section of CPT®: ordered and reviewed  Review and summarize past medical records: yes  Discuss the patient with other providers: yes  Independent visualization of images, tracings, or specimens: yes    Risk of Complications, Morbidity, and/or Mortality  Presenting problems: moderate  Diagnostic procedures: moderate  Management options: moderate    Patient Progress  Patient progress: stable    ED Course       Procedures  Diagnosis:   1. Left hip pain    2. Bradycardia          Disposition: Discharge to home. Follow-up Information     Follow up With Details Comments Contact Info    [facility] EMERGENCY DEPT  As needed, If symptoms worsen [address] [place] [address] [place] [address] [facility]    Ivis Quinones MD Go in 2 days  [address] [facility] [address] [facility]      Trinity Martinez MD Go in 3 days  [place] [address]  [phone]            Patient's Medications   Start Taking    ACETAMINOPHEN-CODEINE (TYLENOL-CODEINE #3) 300-30 MG PER TABLET    Take 1 Tab by mouth every four (4) hours as needed for Pain. Max Daily Amount: 6 Tabs. CYCLOBENZAPRINE (FLEXERIL) 5 MG TABLET    Take 1 Tab by mouth three (3) times daily as needed for Muscle Spasm(s). Indications:  MUSCLE SPASM   Continue Taking ACETAMINOPHEN (TYLENOL) 325 MG TABLET    Take  by mouth every four (4) hours as needed for Pain.    These Medications have changed    No medications on file   Stop Taking    No medications on file

## 2017-08-08 NOTE — TELEPHONE ENCOUNTER
Mrs Cecelia Infante called in stating her mother called said that something is wrong with her heart and her left leg hurts. Mrs Cecelia Infante tried calling their PCP and their office is closed, so she called us asking whether to go to ER or come to see Dr Benedict Olmos. I asked if there are another symptoms, and daughter is not sure about anything else except for left leg pain. I stated to the daughter that if the pain is isolated it could be vascular like a possible clot, or possible Ischiac nerve issue. In order to rule out either we don't possess necessary imaging equipment that is why I suggest going to the ER. About this time call broke. I tried to call back mrs Kelli Alegria, but no answer.

## 2017-08-08 NOTE — PROCEDURES
[facility]  *** [facility] RE[facility] ***    Name: Emperatriz Mayorga  MRN: [id number]  : 1925  HIS Order #: [id number]  [address] [facility] Visit #: [id number]  Date: 08 Aug 2017    TYPE OF TEST: Peripheral Venous Testing    REASON FOR TEST  Pain in limb    Left Leg:-  Deep venous thrombosis:           No  Superficial venous thrombosis:    Not examined  Deep venous insufficiency:        Not examined  Superficial venous insufficiency: Not examined      INTERPRETATION/FINDINGS  Duplex images were obtained using 2-D gray scale, color flow, and  spectral Doppler analysis. Left leg :  1. Deep vein(s) visualized include the common femoral, proximal  femoral, mid femoral, distal femoral, popliteal(above knee),  popliteal(fossa), popliteal(below knee), posterior tibial and peroneal   veins. 2. No evidence of deep venous thrombosis detected in the veins  visualized. 3. No evidence of deep vein thrombosis in the contralateral common  femoral vein. 4. Superficial vein(s) visualized include the great saphenous vein. ADDITIONAL COMMENTS    I have personally reviewed the data relevant to the interpretation of  this  study. TECHNOLOGIST: Diamond Dumont, [facility], RVT/  Signed: 2017 06:46 PM    PHYSICIAN: Zaria Venegas D.O.   Signed: 08/10/2017 07:17 PM

## 2017-08-22 NOTE — TELEPHONE ENCOUNTER
Daughter called in stated that Dr Nehemiah Mcdowell contacted her on the 2nd august and stated he will do another ECHO on the patient. No one contacted her or mother. Patient's daughter is worried. Found ECHO order in the [facility].    Given order to Maykel Barbour

## 2017-11-19 PROBLEM — S72.142A INTERTROCHANTERIC FRACTURE OF LEFT HIP (HCC): Status: ACTIVE | Noted: 2017-01-01

## 2017-11-19 PROBLEM — I08.0 MITRAL REGURGITATION AND AORTIC STENOSIS: Status: ACTIVE | Noted: 2017-01-01

## 2017-11-19 PROBLEM — S72.23XA SUBTROCHANTERIC FRACTURE (HCC): Status: ACTIVE | Noted: 2017-01-01

## 2017-11-19 NOTE — PERIOP NOTES
TRANSFER - OUT REPORT:    Verbal report given to Tata ONEAL on Shadi Early  being transferred to  for routine post - op       Report consisted of patients Situation, Background, Assessment and   Recommendations(SBAR). Information from the following report(s) SBAR and MAR was reviewed with the receiving nurse. Lines:   Peripheral IV 11/19/17 Right Forearm (Active)   Site Assessment Clean, dry, & intact 11/19/2017  3:29 PM   Phlebitis Assessment 0 11/19/2017  3:29 PM   Infiltration Assessment 0 11/19/2017  3:29 PM   Dressing Status Clean, dry, & intact 11/19/2017  3:29 PM   Dressing Type Tape;Transparent 11/19/2017  3:29 PM   Hub Color/Line Status Pink; Infusing 11/19/2017  3:29 PM   Action Taken Blood drawn 11/19/2017 10:12 AM        Opportunity for questions and clarification was provided.       Patient transported with:   O2 @ 2 liters   RN

## 2017-11-19 NOTE — ROUTINE PROCESS
Pt is stable, no distress noted, +pedal pulse, pt denies numbness and tingling, Dressing upper slight drainage and lower drainage is CDI. will continue to monitor.

## 2017-11-19 NOTE — ED PROVIDER NOTES
HPI Comments: 10:39 AM Kerry Barraza is a 80 y.o. female with h/o syncope who presents to ED for evaluation of a recent fall she experienced onset 3 days ago. Associated sx are constant hip and head pain with a pain severity 8/10. Denies CP. Pain is worse with movement. Patient has a shx of tobacco and alcohol use. No further complaints at this time. PCP: Gregory Cisneros MD      The history is provided by the patient. Past Medical History:   Diagnosis Date    Ill-defined condition     synope       Past Surgical History:   Procedure Laterality Date    HX CHOLECYSTECTOMY  6/2015    HX HEMORRHOIDECTOMY      HX HERNIA REPAIR  9/5534    umbilical    HX HYSTERECTOMY           Family History:   Problem Relation Age of Onset    Heart Disease Mother     Hypertension Mother     Lung Disease Father     Stroke Neg Hx     Diabetes Neg Hx     Cancer Neg Hx        Social History     Social History    Marital status:      Spouse name: N/A    Number of children: N/A    Years of education: N/A     Occupational History    Not on file. Social History Main Topics    Smoking status: Former Smoker     Packs/day: 1.00     Years: 22.00     Quit date: 8/1/1967    Smokeless tobacco: Never Used    Alcohol use Yes      Comment: occasional    Drug use: No    Sexual activity: Not on file     Other Topics Concern    Not on file     Social History Narrative         ALLERGIES: Erythromycin    Review of Systems   Constitutional: Negative. Negative for chills and diaphoresis. HENT: Negative. Negative for congestion, rhinorrhea and sore throat. Eyes: Negative. Negative for pain, discharge and redness. Respiratory: Negative. Negative for cough, chest tightness, shortness of breath and wheezing. Cardiovascular: Negative. Negative for chest pain. Gastrointestinal: Negative. Negative for constipation and diarrhea. Genitourinary: Negative.   Negative for dysuria, flank pain, frequency and urgency. Musculoskeletal: Negative for back pain and neck pain. Positive for Hip and Leg pain. Skin: Negative. Negative for rash. Neurological: Negative. Negative for syncope and weakness. Psychiatric/Behavioral: Negative. All other systems reviewed and are negative. Vitals:    11/19/17 0937 11/19/17 0941   BP: 146/66    Pulse: (!) 58    Resp: 16    Temp: 98.2 °F (36.8 °C)    SpO2: 99%    Weight:  44 kg (97 lb)            Physical Exam   Constitutional: She is oriented to person, place, and time. She appears well-developed and well-nourished. Non-toxic appearance. She does not have a sickly appearance. She does not appear ill. She appears distressed (mild ). HENT:   Head: Normocephalic. Right Ear: No hemotympanum. Left Ear: No hemotympanum. Nose: Nose normal.   Mouth/Throat: Oropharynx is clear and moist. No oropharyngeal exudate. Eyes: Conjunctivae and EOM are normal. Pupils are equal, round, and reactive to light. No scleral icterus. Neck: Trachea normal and normal range of motion. Neck supple. JVD present. No hepatojugular reflux present. No tracheal deviation present. No thyromegaly present. Cardiovascular: Regular rhythm, S1 normal, S2 normal, intact distal pulses and normal pulses. Bradycardia present. Exam reveals no gallop, no S3 and no S4. Murmur heard. Systolic murmur is present with a grade of 4/6   Pulses:       Radial pulses are 2+ on the right side, and 2+ on the left side. Dorsalis pedis pulses are 2+ on the right side, and 2+ on the left side. Pulmonary/Chest: Effort normal and breath sounds normal. No accessory muscle usage. No respiratory distress. She has no decreased breath sounds. She has no wheezes. She has no rhonchi. She has no rales. Abdominal: Soft. Normal appearance and bowel sounds are normal. She exhibits no distension, no fluid wave, no ascites and no mass. There is no hepatosplenomegaly. There is no tenderness.  There is no rigidity, no rebound, no guarding, no CVA tenderness, no tenderness at McBurney's point and negative Sullivan's sign. Musculoskeletal: Normal range of motion. She exhibits tenderness. She exhibits no edema. Left upper leg: She exhibits bony tenderness. She exhibits no swelling, no edema, no deformity and no laceration. Strength 4/5 throughout with exception of left leg secondary to pain      Lymphadenopathy:        Head (right side): No submental, no submandibular, no preauricular and no occipital adenopathy present. Head (left side): No submental, no submandibular, no preauricular and no occipital adenopathy present. She has no cervical adenopathy. Right: No supraclavicular adenopathy present. Left: No supraclavicular adenopathy present. Neurological: She is alert and oriented to person, place, and time. She has normal strength and normal reflexes. She is not disoriented. No cranial nerve deficit or sensory deficit. Coordination and gait normal. GCS eye subscore is 4. GCS verbal subscore is 5. GCS motor subscore is 6. Grossly intact    Skin: Skin is warm, dry and intact. No rash noted. She is not diaphoretic. Psychiatric: She has a normal mood and affect. Her speech is normal and behavior is normal. Judgment and thought content normal. Cognition and memory are normal.   Nursing note and vitals reviewed. MDM  Number of Diagnoses or Management Options  Diagnosis management comments: Fall  Hip fracture   ACS  Intracranial bleed      ED Course       Procedures  Labs essentially normal. Chest X-Ray showed No acute process. EKG showed NSR with rate of 63 bpm. With no ST elevations or depression and non specific T wave changes. Xray of left hip showed:  Complex comminuted impacted intertrochanteric fracture of the left proximal femur without dislocation with associated soft tissue swelling and extensive vascular calcification of the proximal left thigh.     CT HEAD WO CONT: IMPRESSION:  1. No acute intracranial hemorrhage, mass effect or midline structural shift. 2. Moderate left supraorbital/frontal soft tissue swelling without associated  osseous deformity. 10:43 AM 11/19/2017    11:05 AM Consult: I discussed care with Dr. Jimmy Palacios (Orthopedic Surgeon). It was a standard discussion including patient history, chief complaint, available diagnostic results, and predicted treatment course. Would like the hospitalist to admit and will likely take to surgery today or tomorrow. 11:14 AM Consult: I discussed care with Dr. Cunha Friday (Hospitalist). It was a standard discussion including patient history, chief complaint, available diagnostic results, and predicted treatment course. Agrees to admit. Scribe Attestation     Fermin Hood acting as a scribe for and in the presence of Ivana Pandey DO      November 19, 2017 at 10:44 AM       Provider Attestation:      I personally performed the services described in the documentation, reviewed the documentation, as recorded by the scribe in my presence, and it accurately and completely records my words and actions.  November 19, 2017 at 10:44 AM - Arlet Harper DO

## 2017-11-19 NOTE — PROCEDURES
Pre-op DX: L hip 4 part IT fracture  Post-op DX: Same  Procedure: ORIF  Hip IT fracture with IM nail  Anesthesia: KASH  Surgeon: Simón Senior  Antibiotics: 2g cefzolin  EBL: 20mL  Implants:  -Synthes 340mm x 10mm L TFN 130deg nail  -90mm lag screw  -34mm distal interlocking screw    Plan:  -WBAT LLE  -Chemical DVT prophylaxis x 21 days  -PT/OT  -admit to hospitalist  -routine post-op care  -Ortho to follow    Op note [id number]

## 2017-11-19 NOTE — IP AVS SNAPSHOT
[facility] 
 
 
 [address] Patient: EULALIA RICCI [facility] MRN: [id number] About your hospitalization You were admitted on:  November 19, 2017 You last received care in the:  [facility] You were discharged on:  November 29, 2017 Why you were hospitalized Your primary diagnosis was:  Closed Displaced Intertrochanteric Fracture Of Left Femur (Hcc) Your diagnoses also included:  Severe Mitral Regurgitation By Prior Echocardiogram, Status Post-Operative Repair Of Closed Fracture Of Left Hip, Impaired Mobility And Adls, Acute Blood Loss As Cause Of Postoperative Anemia, Severe Aortic Stenosis By Prior Echocardiogram, Heme Positive Stool, Acute Metabolic Encephalopathy, Encounter For Blood Transfusion, Do Not Resuscitate Status, History Of Syncope Things You Need To Do (next 8 weeks) Follow up with Andrea Claude, MD  
  
Phone:  [phone] Where:  [address], [address] [facility] [address] Discharge Orders None A check carlitos indicates which time of day the medication should be taken. My Medications STOP taking these medications   
 acetaminophen-codeine 300-30 mg per tablet Commonly known as:  TYLENOL-CODEINE #3  
   
  
 cyclobenzaprine 5 mg tablet Commonly known as:  FLEXERIL  
   
  
  
TAKE these medications as instructed Instructions Each Dose to Equal  
 Morning Noon Evening Bedtime  
 acetaminophen 325 mg tablet Commonly known as:  TYLENOL Your last dose was: Your next dose is: Take 2 Tabs by mouth every six (6) hours as needed for Pain or Fever. Indications: Pain 650 mg  
    
   
   
   
  
 bisacodyl 5 mg EC tablet Commonly known as:  DULCOLAX Your last dose was: Your next dose is: Take 2 Tabs by mouth daily as needed for Constipation.   
 10 mg  
    
   
   
   
  
 HYDROcodone-acetaminophen 5-325 mg per tablet Commonly known as:  Radha Mura Your last dose was: Your next dose is: Take 1 Tab by mouth every four (4) hours as needed. Max Daily Amount: 6 Tabs. For pain unrelieved by Tylenol. 1 Tab Where to Get Your Medications Information on where to get these meds will be given to you by the nurse or doctor. ! Ask your nurse or doctor about these medications  
  acetaminophen 325 mg tablet  
 bisacodyl 5 mg EC tablet HYDROcodone-acetaminophen 5-325 mg per tablet Discharge Instructions Patient armband removed and shredded DISCHARGE SUMMARY from Nurse PATIENT INSTRUCTIONS: 
 
After general anesthesia or intravenous sedation, for 24 hours or while taking prescription Narcotics: · Limit your activities · Do not drive and operate hazardous machinery · Do not make important personal or business decisions · Do  not drink alcoholic beverages · If you have not urinated within 8 hours after discharge, please contact your surgeon on call. Report the following to your surgeon: 
· Excessive pain, swelling, redness or odor of or around the surgical area · Temperature over 100.5 · Nausea and vomiting lasting longer than 4 hours or if unable to take medications · Any signs of decreased circulation or nerve impairment to extremity: change in color, persistent  numbness, tingling, coldness or increase pain · Any questions What to do at Home: If you experience any of the following symptoms Nausea, vomiting, diarrhea, fever greater than 100.5, dizziness, severe headache, shortness of breath, chest pain, increased pain, please follow up with PCP. *  Please give a list of your current medications to your Primary Care Provider.  
 
*  Please update this list whenever your medications are discontinued, doses are 
    changed, or new medications (including over-the-counter products) are added. 
 
*  Please carry medication information at all times in case of emergency situations. These are general instructions for a healthy lifestyle: No smoking/ No tobacco products/ Avoid exposure to second hand smoke Surgeon General's Warning:  Quitting smoking now greatly reduces serious risk to your health. Obesity, smoking, and sedentary lifestyle greatly increases your risk for illness A healthy diet, regular physical exercise & weight monitoring are important for maintaining a healthy lifestyle You may be retaining fluid if you have a history of heart failure or if you experience any of the following symptoms:  Weight gain of 3 pounds or more overnight or 5 pounds in a week, increased swelling in our hands or feet or shortness of breath while lying flat in bed. Please call your doctor as soon as you notice any of these symptoms; do not wait until your next office visit. Recognize signs and symptoms of STROKE: 
 
F-face looks uneven A-arms unable to move or move unevenly S-speech slurred or non-existent T-time-call 911 as soon as signs and symptoms begin-DO NOT go Back to bed or wait to see if you get better-TIME IS BRAIN. Warning Signs of HEART ATTACK Call 911 if you have these symptoms: 
? Chest discomfort. Most heart attacks involve discomfort in the center of the chest that lasts more than a few minutes, or that goes away and comes back. It can feel like uncomfortable pressure, squeezing, fullness, or pain. ? Discomfort in other areas of the upper body. Symptoms can include pain or discomfort in one or both arms, the back, neck, jaw, or stomach. ? Shortness of breath with or without chest discomfort. ? Other signs may include breaking out in a cold sweat, nausea, or lightheadedness. Don't wait more than five minutes to call [email or web address]! Fast action can save your life.  Calling 911 is almost always the fastest way to get lifesaving treatment. Emergency Medical Services staff can begin treatment when they arrive  up to an hour sooner than if someone gets to the hospital by car. The discharge information has been reviewed with the patient and caregiver. The patient and caregiver verbalized understanding. Discharge medications reviewed with the patient and caregiver and appropriate educational materials and side effects teaching were provided. ___________________________________________________________________________________________________________________________________ [employer] Announcement We are excited to announce that we are making your provider's discharge notes available to you in Online Dealer. You will see these notes when they are completed and signed by the physician that discharged you from your recent hospital stay. If you have any questions or concerns about any information you see in Online Dealer, please call the Health Information Department where you were seen or reach out to your Primary Care Provider for more information about your plan of care. [facility]! Marilyn Zhou introduces Online Dealer patient portal. Now you can access parts of your medical record, email your doctor's office, and request medication refills online. 1. In your internet browser, go to [email or web address] 2. Click on the First Time User? Click Here link in the Sign In box. You will see the New Member Sign Up page. 3. Enter your Online Dealer Access Code exactly as it appears below. You will not need to use this code after youve completed the sign-up process. If you do not sign up before the expiration date, you must request a new code. · Online Dealer Access Code: [email or web address] Expires: 2/27/2018  5:02 PM 
 
4. Enter the last four digits of your Social Security Number (xxxx) and Date of Birth (mm/dd/yyyy) as indicated and click Submit. You will be taken to the next sign-up page. 5. Create a Talkspace ID. This will be your Talkspace login ID and cannot be changed, so think of one that is secure and easy to remember. 6. Create a Talkspace password. You can change your password at any time. 7. Enter your Password Reset Question and Answer. This can be used at a later time if you forget your password. 8. Enter your e-mail address. You will receive e-mail notification when new information is available in [address] 9. Click Sign Up. You can now view and download portions of your medical record. 10. Click the Download Summary menu link to download a portable copy of your medical information. If you have questions, please visit the Frequently Asked Questions section of the Talkspace website. Remember, Talkspace is NOT to be used for urgent needs. For medical emergencies, dial 911. Now available from your iPhone and Android! Providers Seen During Your Hospitalization Provider Specialty Primary office phone Dash Pritchett,  Emergency Medicine [phone] Lynette Aguilar DO Hospitalist [phone] Your Primary Care Physician (PCP) Primary Care Physician Office Phone Office Fax Estle Dec [phone] You are allergic to the following Allergen Reactions Erythromycin Itching Recent Documentation Height Weight Breastfeeding? BMI OB Status Smoking Status 1.549 m 44 kg No 18.94 kg/m2 Hysterectomy Former Smoker Emergency Contacts Name Discharge Info Relation Home Work Mobile [facility] Belen CAREGIVER [3] Spouse [3] [phone] Yamileth Brink DISCHARGE CAREGIVER [3] Child [2] [phone] Patient Belongings The following personal items are in your possession at time of discharge: 
  Dental Appliances: None  Visual Aid: Glasses      Home Medications: None   Jewelry: None  Clothing: None    Other Valuables: None  Personal Items Sent to Safe: none Discharge Instructions Attachments/References ACETAMINOPHEN (BY MOUTH) (ENGLISH) BISACODYL (BY MOUTH) (ENGLISH) HYDROCODONE/ACETAMINOPHEN (BY MOUTH) (ENGLISH) Patient Handouts Acetaminophen (By mouth) Acetaminophen (o-klvk-g-MIN-oh-fen) Treats minor aches and pain and reduces fever. Brand Name(s): 8 Hour Pain Relief, Acetaminophen Children's, Acetaminophen Infant's, Arthritis Pain Formula, Arthritis Pain Relief, Cetafen, Cetafen Extra, Children's Acetaminophen, Children's Dye Free Pain and Fever, Children's Mapap, Children's Pain & Fever, Children's Pain Relief, Children's Pain Reliever, Children's Tylenol, Comtrex Sore Throat Relief There may be other brand names for this medicine. When This Medicine Should Not Be Used: This medicine is not right for everyone. Do not use it if you had an allergic reaction to acetaminophen. How to Use This Medicine:  
Capsule, Liquid Filled Capsule, Liquid, Powder, Tablet, Chewable Tablet, Dissolving Tablet, Fizzy Tablet, Long Acting Tablet · Your doctor will tell you how much medicine to use. Do not use more than directed. · If you are taking this medicine without the advice of your doctor, carefully read and follow the Drug Facts label and dosing instructions on the medicine package. Ask your doctor or pharmacist if you are not sure how to use this medicine. · Do not take this medicine for more than 10 days in a row, unless directed by your doctor. · The chewable tablet should be chewed or crushed before you swallow it. · Oral liquids: Measure the oral liquid medicine with a marked measuring spoon, oral syringe, or medicine cup. Do not use a spoon, syringe, or cup that came with a different medicine. · Oral liquid (with syringe): ¨ Shake the bottle well before each use. ¨ Remove the cap. Attach the syringe to the flow restrictor. Turn the bottle upside down. ¨ Pull back the syringe plunger until it is filled with the correct dose. Ask your doctor or pharmacist if you are not sure how much medicine to use. ¨ Slowly give the medicine into your child's mouth. Point the syringe so the medicine goes toward the inner cheek. · Oral liquid (with dropper): ¨ Shake the bottle well before each use. ¨ Remove the cap. Insert the dropper into the bottle. Withdraw the correct dose. Ask your doctor or pharmacist if you are not sure how much medicine to use. ¨ Slowly give the medicine into your child's mouth. Point the dropper so the medicine goes toward the inner cheek. · Extended-release tablet: Swallow the extended-release tablet whole. Do not crush, break, or chew it. Take this medicine with a full glass of water. · Use only the brand of medicine your doctor prescribed. Other brands may not work the same way. · Missed dose: Take a dose as soon as you remember. If it is almost time for your next dose, wait until then and take a regular dose. Do not take extra medicine to make up for a missed dose. · Store the medicine in a closed container at room temperature, away from heat, moisture, and direct light. Drugs and Foods to Avoid: Ask your doctor or pharmacist before using any other medicine, including over-the-counter medicines, vitamins, and herbal products. · Some medicines and foods can affect how acetaminophen works. Tell your doctor if you are taking a blood thinner, such as warfarin. · Do not drink alcohol while you are using this medicine. Acetaminophen can damage your liver, and alcohol can increase this risk. Do not take acetaminophen without asking your doctor if you have 3 or more drinks of alcohol every day. Warnings While Using This Medicine: · Tell your doctor if you are pregnant or breastfeeding, or if you have kidney or liver disease. Tell your doctor if you have phenylketonuria (PKU). Some brands of acetaminophen contain aspartame, which can make PKU worse. · This medicine contains acetaminophen. Read the labels of all other medicines you are using to see if they also contain acetaminophen, or ask your doctor or pharmacist. Mary Lynn not use more than 4 grams (4,000 milligrams) total of acetaminophen in one day. For Tylenol® Extra Strength, it is not safe to take more than 3 grams (3,000 milligrams) in 1 day. · Call your doctor if your symptoms do not improve or if they get worse. · Tell any doctor or dentist who treats you that you are using this medicine. This medicine may affect certain medical test results. · Keep all medicine out of the reach of children. Never share your medicine with anyone. Possible Side Effects While Using This Medicine:  
Call your doctor right away if you notice any of these side effects: · Allergic reaction: Itching or hives, swelling in your face or hands, swelling or tingling in your mouth or throat, chest tightness, trouble breathing · Bloody or black, tarry stools · Dark urine or pale stools, nausea, vomiting, loss of appetite, severe stomach pain, yellow skin or eyes · Fever or a sore throat that lasts longer than 3 days, or pain that lasts longer than 5 days · Lightheadedness, fainting, sweating, or weakness · Unusual bleeding or bruising · Vomiting blood or material that looks like coffee grounds If you notice other side effects that you think are caused by this medicine, tell your doctor. Call your doctor for medical advice about side effects. You may report side effects to FDA at 6-202-FDA-7736 © 2017 [address] Information is for End User's use only and may not be sold, redistributed or otherwise used for commercial purposes. The above information is an  only. It is not intended as medical advice for individual conditions or treatments. Talk to your doctor, nurse or pharmacist before following any medical regimen to see if it is safe and effective for you. Bisacodyl (By mouth) Bisacodyl (bis-AK-oh-dil) Treats constipation. Brand Name(s): Alophen, Correctol, Dulcolax, Dulcolax Bowel Cleansing Kit, Dulcolax Bowel Prep Kit, Fleet Bisacodyl, Gentle Laxative, [facility] Laxative, Good Sense Bisacodyl Laxative, Good Sense Women's Laxative, Fly Prep, Medi-Lax, , TopCare Laxative, TopCare Woman's Laxative There may be other brand names for this medicine. When This Medicine Should Not Be Used: You should not use this medicine if you have had an allergic reaction to bisacodyl. How to Use This Medicine:  
Tablet, Coated Tablet · Your doctor will tell you how much medicine to use. Do not use more than directed. · Swallow the tablet whole. Do not chew or crush it. How to Store and Dispose of This Medicine: · Store the medicine in a closed container at room temperature, away from heat, moisture, and direct light. · Ask your pharmacist, doctor, or health caregiver about the best way to dispose of any outdated medicine or medicine no longer needed. · Keep all medicine out of the reach of children. Never share your medicine with anyone. Drugs and Foods to Avoid: Ask your doctor or pharmacist before using any other medicine, including over-the-counter medicines, vitamins, and herbal products. · Do not use this medicine within 1 hour after drinking milk or taking an antacid. Warnings While Using This Medicine: · Make sure your doctor knows if you are pregnant or breast feeding. · Before using this medicine, make sure your doctor knows if you have stomach pain, nausea, vomiting, or a sudden change in bowel habits. · Make sure your doctor knows if you cannot swallow the tablet without chewing. · Tell your doctor if your constipation does not improve after using this medicine for 1 week. · You should not give this medicine to a child under 10years of age unless a doctor tells you to.  
· You may not see results for several hours after you have taken this medicine. · Stop using this medicine and call your doctor right away if you do not have a bowel movement in 12 hours, or if you have rectal bleeding. Possible Side Effects While Using This Medicine:  
Call your doctor right away if you notice any of these side effects: · Allergic reaction: Itching or hives, swelling in your face or hands, swelling or tingling in your mouth or throat, chest tightness, trouble breathing · Lightheadedness or fainting. If you notice these less serious side effects, talk with your doctor: · Mild stomach cramps or discomfort. If you notice other side effects that you think are caused by this medicine, tell your doctor. Call your doctor for medical advice about side effects. You may report side effects to FDA at 3-016-FDA-5490 © 2017 [address] Information is for End User's use only and may not be sold, redistributed or otherwise used for commercial purposes. The above information is an  only. It is not intended as medical advice for individual conditions or treatments. Talk to your doctor, nurse or pharmacist before following any medical regimen to see if it is safe and effective for you. Hydrocodone/Acetaminophen (By mouth) Acetaminophen (a-foit-o-MIN-oh-fen), Hydrocodone Bitartrate (hkf-zveb-PUE-done bye-TAR-trate) Treats pain. This medicine contains a narcotic pain reliever. Brand Name(s): Hycet, Lorcet, Lorcet HD, Lorcet Plus, Lortab 10/325, Lortab 5/325, Lortab 7.5/325, Lortab Elixir, Norco, Verdrocet, Vicodin, Vicodin ES, Vicodin HP, Xodol, Xodol 5/300 There may be other brand names for this medicine. When This Medicine Should Not Be Used: This medicine is not right for everyone. Do not use it if you had an allergic reaction to acetaminophen, hydrocodone, or other narcotic medicines, or stomach or bowel blockage (including paralytic ileus). How to Use This Medicine:  
Capsule, Liquid, Tablet · Your doctor will tell you how much medicine to use. Do not use more than directed. · An overdose can be dangerous. Follow directions carefully so you do not get too much medicine at one time. · Oral liquid: Measure the oral liquid medicine with a marked measuring spoon, oral syringe, or medicine cup. · Drink plenty of liquids to help avoid constipation. · This medicine should come with a Medication Guide. Ask your pharmacist for a copy if you do not have one. · Missed dose: Take a dose as soon as you remember. If it is almost time for your next dose, wait until then and take a regular dose. Do not take extra medicine to make up for a missed dose. · Store the medicine in a closed container at room temperature, away from heat, moisture, and direct light. Flush any unused Norco® tablets down the toilet. Drugs and Foods to Avoid: Ask your doctor or pharmacist before using any other medicine, including over-the-counter medicines, vitamins, and herbal products. · Do not use this medicine if you are using or have used an MAO inhibitor within the past 14 days. · Some medicines can affect how hydrocodone/acetaminophen works. Tell your doctor if you are using any of the following: ¨ Carbamazepine, erythromycin, ketoconazole, mirtazapine, phenytoin, rifampin, ritonavir, tramadol, trazodone ¨ Diuretic (water pill) ¨ Medicine to treat depression or mental health problems ¨ Medicine to treat migraine headaches ¨ Phenothiazine medicine · Tell your doctor if you use anything else that makes you sleepy. Some examples are allergy medicine, narcotic pain medicine, and alcohol. Tell your doctor if you are using buprenorphine, butorphanol, nalbuphine, pentazocine, or a muscle relaxer. · Do not drink alcohol while you are using this medicine. Acetaminophen can damage your liver, and your risk is higher if you also drink alcohol. Warnings While Using This Medicine: · Tell your doctor if you are pregnant or breastfeeding, or if you have kidney disease, liver disease, lung or breathing problems, gallbladder or pancreas problems, an underactive thyroid, Christofer disease, prostate problems, trouble urinating, stomach problems, or a history of head injury or brain tumor, seizures, alcohol or drug addiction. · This medicine may cause the following problems:  
¨ High risk of overdose, which can lead to death ¨ Respiratory depression (serious breathing problem that can be life-threatening) ¨ Liver problems ¨ Serious skin reactions ¨ Serotonin syndrome (when used with certain medicines) · This medicine can be habit-forming. Do not use more than your prescribed dose. Call your doctor if you think your medicine is not working. · This medicine may make you dizzy or drowsy. Do not drive or doing anything else that could be dangerous until you know how this medicine affects you. · This medicine contains acetaminophen. Read the labels of all other medicines you are using to see if they also contain acetaminophen, or ask your doctor or pharmacist. Higinio Edward not use more than 4 grams (4,000 milligrams) total of acetaminophen in one day. · Tell any doctor or dentist who treats you that you are using this medicine. This medicine may affect certain medical test results. · This medicine may cause constipation, especially with long-term use. Ask your doctor if you should use a laxative to prevent and treat constipation. · This medicine could cause infertility. Talk with your doctor before using this medicine if you plan to have children. · Keep all medicine out of the reach of children. Never share your medicine with anyone. Possible Side Effects While Using This Medicine:  
Call your doctor right away if you notice any of these side effects: · Allergic reaction: Itching or hives, swelling in your face or hands, swelling or tingling in your mouth or throat, chest tightness, trouble breathing · Anxiety, restlessness, fast heartbeat, fever, sweating, muscle spasms, twitching, diarrhea, seeing or hearing things that are not there · Blistering, peeling, red skin rash · Blue lips, fingernails, or skin · Dark urine or pale stools, loss of appetite, nausea or vomiting, stomach pain, yellow skin or eyes · Extreme weakness, shallow breathing, slow heartbeat, sweating, seizures, cold or clammy skin · Lightheadedness, dizziness, fainting If you notice these less serious side effects, talk with your doctor: · Constipation, nausea, vomiting · Tiredness or sleepiness If you notice other side effects that you think are caused by this medicine, tell your doctor. Call your doctor for medical advice about side effects. You may report side effects to FDA at 1-388-FDA-2917 © 2017 [address] Information is for End User's use only and may not be sold, redistributed or otherwise used for commercial purposes. The above information is an  only. It is not intended as medical advice for individual conditions or treatments. Talk to your doctor, nurse or pharmacist before following any medical regimen to see if it is safe and effective for you. Please provide this summary of care documentation to your next provider. Signatures-by signing, you are acknowledging that this After Visit Summary has been reviewed with you and you have received a copy. Patient Signature:  ____________________________________________________________ Date:  ____________________________________________________________  
  
Josph Perfect Provider Signature:  ____________________________________________________________ Date:  ____________________________________________________________

## 2017-11-19 NOTE — ANESTHESIA PREPROCEDURE EVALUATION
Anesthetic History   No history of anesthetic complications            Review of Systems / Medical History  Patient summary reviewed and pertinent labs reviewed    Pulmonary  Within defined limits                 Neuro/Psych   Within defined limits           Cardiovascular  Within defined limits                  Comments: AS, MR, TR     GI/Hepatic/Renal  Within defined limits              Endo/Other  Within defined limits           Other Findings   Comments:   Risk Factors for Postoperative nausea/vomiting:       History of postoperative nausea/vomiting? NO       Female? YES       Motion sickness? NO       Intended opioid administration for postoperative analgesia? YES      Smoking Abstinence  Current Smoker? NO  Elective Surgery? NO  Seen preoperatively by anesthesiologist or proxy prior to day of surgery? YES  Pt abstained from smoking 24 hours prior to anesthesia?  N/A           Physical Exam    Airway  Mallampati: II  TM Distance: 4 - 6 cm  Neck ROM: normal range of motion   Mouth opening: Normal     Cardiovascular               Dental    Dentition: Full upper dentures     Pulmonary                 Abdominal  GI exam deferred       Other Findings            Anesthetic Plan    ASA: 3  Anesthesia type: general          Induction: Intravenous  Anesthetic plan and risks discussed with: Patient and Family

## 2017-11-19 NOTE — ED TRIAGE NOTES
Patient arrived from nursing home via EMS c/o fall from bed. Patient states her hip and head have been hurting her and rates pain 8/10. Patient allergies up to date.

## 2017-11-19 NOTE — Clinical Note
Status[de-identified] Inpatient [101] Type of Bed: Surgical [18] Inpatient Hospitalization Certified Necessary for the Following Reasons: 3. Patient receiving treatment that can only be provided in an inpatient setting (further clarification in H&P documentation) Admitting Diagnosis: Subtrochanteric fracture ([facility] [[id number]] Admitting Physician: Cristina Willis Attending Physician: Cristina Willis Estimated Length of Stay: 3-4 Midnights Discharge Plan[de-identified] [facility]

## 2017-11-19 NOTE — CONSULTS
Orthopedic Fracture History and Physical    Subjective: Crispin Luevano is a 80 y.o. female recently transitioned to assisted living who is admitted to [facility] following a mechanical fall at [facility]. She was trying to untangle two door handles that were interconnected when she fell backwards, bumping her head and injuring her left hip. She was transported to [facility]. She is able to ambulate independently. She recently lost her  due top complications following a hip fracture, and is concerned regarding her own injury. Patient Active Problem List    Diagnosis Date Noted    Subtrochanteric fracture ([facility] [address] 11/19/2017    Intertrochanteric fracture of left hip ([facility] 11/19/2017    Aortic stenosis, moderate 08/01/2017    RLQ abdominal pain 06/05/2015    Appendicitis 06/05/2015     Past Medical History:   Diagnosis Date    Ill-defined condition     synope      Past Surgical History:   Procedure Laterality Date    HX CHOLECYSTECTOMY  6/2015    HX HEMORRHOIDECTOMY      HX HERNIA REPAIR  1/2252    umbilical    HX HYSTERECTOMY        Allergies   Allergen Reactions    Erythromycin Itching     Prior to Admission medications    Medication Sig Start Date End Date Taking? Authorizing Provider   acetaminophen-codeine (TYLENOL-CODEINE #3) 300-30 mg per tablet Take 1 Tab by mouth every four (4) hours as needed for Pain. Max Daily Amount: 6 Tabs. 8/8/17   Sven Carrasquillo PA-C   cyclobenzaprine (FLEXERIL) 5 mg tablet Take 1 Tab by mouth three (3) times daily as needed for Muscle Spasm(s). Indications: MUSCLE SPASM 8/8/17   Sven Carrasquillo PA-C   acetaminophen (TYLENOL) 325 mg tablet Take  by mouth every four (4) hours as needed for Pain.     Historical Provider     Family History   Problem Relation Age of Onset    Heart Disease Mother     Hypertension Mother     Lung Disease Father     Stroke Neg Hx     Diabetes Neg Hx     Cancer Neg Hx       Social History   Substance Use Topics    Smoking status: Former Smoker     Packs/day: 1.00     Years: 22.00     Quit date: 8/1/1967    Smokeless tobacco: Never Used    Alcohol use Yes      Comment: occasional        Review of Systems     As per HPI for L hip pain    Mental Status: mild dementia    Objective:     Temp: 98.2 °F (36.8 °C) (11/19/17 0937) Pulse (Heart Rate): 62 (11/19/17 1145) Resp Rate: 16 (11/19/17 0937) BP: 152/49 (11/19/17 1145) O2 Sat (%): 97 % (11/19/17 1145) Weight: 44 kg (97 lb) (11/19/17 0941)   Physical Exam    Left hip shortened and externally rotated. Intact EHL, FHL, GS, TA motor function. L3-S1 sensation intact. Imaging Review:  L hip 4 part intertrochanteric fracture    Labs:   Recent Results (from the past 24 hour(s))   CBC WITH AUTOMATED DIFF    Collection Time: 11/19/17 10:05 AM   Result Value Ref Range    WBC 4.9 4.6 - 13.2 K/uL    RBC 3.68 (L) 4.20 - 5.30 M/uL    HGB 11.7 (L) 12.0 - 16.0 g/dL    HCT 35.6 35.0 - 45.0 %    MCV 96.7 74.0 - 97.0 FL    MCH 31.8 24.0 - 34.0 PG    MCHC 32.9 31.0 - 37.0 g/dL    RDW 14.6 (H) 11.6 - 14.5 %    PLATELET 851 608 - 568 K/uL    MPV 10.3 9.2 - 11.8 FL    NEUTROPHILS 62 40 - 73 %    LYMPHOCYTES 26 21 - 52 %    MONOCYTES 9 3 - 10 %    EOSINOPHILS 2 0 - 5 %    BASOPHILS 1 0 - 2 %    ABS. NEUTROPHILS 3.1 1.8 - 8.0 K/UL    ABS. LYMPHOCYTES 1.3 0.9 - 3.6 K/UL    ABS. MONOCYTES 0.4 0.05 - 1.2 K/UL    ABS. EOSINOPHILS 0.1 0.0 - 0.4 K/UL    ABS.  BASOPHILS 0.0 0.0 - 0.1 K/UL    DF AUTOMATED     METABOLIC PANEL, COMPREHENSIVE    Collection Time: 11/19/17 10:05 AM   Result Value Ref Range    Sodium 143 136 - 145 mmol/L    Potassium 4.3 3.5 - 5.5 mmol/L    Chloride 107 100 - 108 mmol/L    CO2 28 21 - 32 mmol/L    Anion gap 8 3.0 - 18 mmol/L    Glucose 104 (H) 74 - 99 mg/dL    BUN 35 (H) 7.0 - 18 MG/DL    Creatinine 0.93 0.6 - 1.3 MG/DL    BUN/Creatinine ratio 38 (H) 12 - 20      GFR est AA >60 >60 ml/min/1.73m2    GFR est non-AA 56 (L) >60 ml/min/1.73m2    Calcium 9.1 8.5 - 10.1 MG/DL    Bilirubin, total 0.5 0.2 - 1.0 MG/DL    ALT (SGPT) 23 13 - 56 U/L    AST (SGOT) 22 15 - 37 U/L    Alk. phosphatase 66 45 - 117 U/L    Protein, total 7.8 6.4 - 8.2 g/dL    Albumin 3.9 3.4 - 5.0 g/dL    Globulin 3.9 2.0 - 4.0 g/dL    A-G Ratio 1.0 0.8 - 1.7     TYPE & SCREEN    Collection Time: 11/19/17 10:05 AM   Result Value Ref Range    Crossmatch Expiration 11/22/2017     ABO/Rh(D) [place] POSITIVE     Antibody screen NEG    PROTHROMBIN TIME + INR    Collection Time: 11/19/17 10:05 AM   Result Value Ref Range    Prothrombin time 11.6 11.5 - 15.2 sec    INR 0.9 0.8 - 1.2     CARDIAC PANEL,(CK, CKMB & TROPONIN)    Collection Time: 11/19/17 10:05 AM   Result Value Ref Range    CK 41 26 - 192 U/L    CK - MB 1.0 <3.6 ng/ml    CK-MB Index 2.4 0.0 - 4.0 %    Troponin-I, Qt. <0.02 0.0 - 0.045 NG/ML         Assessment:     left 4 part intertrochanteric hip fracture    Plan: Will proceed with operative fixation. Admit to hospitalist, evaluated at bedside. Medical consultation for postoperative medical care.

## 2017-11-19 NOTE — ROUTINE PROCESS
Bedside and Verbal shift change report given to Brady García (oncoming nurse) by Ewa Kirkland (offgoing nurse). Report included the following information SBAR, Kardex, MAR and Recent Results.     SITUATION:    Code Status: Full Code   Reason for Admission: Subtrochanteric fracture ([address]   Intertrochanteric fracture of left hip ([address]   left hip fracture    [facility] day: 0   Problem List:       Hospital Problems  Date Reviewed: 11/19/2017          Codes Class Noted POA    * (Principal)Subtrochanteric fracture ([address] ICD-10-CM: K61.61FA  ICD-9-CM: 820.22  11/19/2017 Yes        Intertrochanteric fracture of left hip ([facility] [address] ICD-10-CM: Y29.067B  ICD-9-CM: 820.21  11/19/2017 Unknown        Mitral regurgitation and aortic stenosis ICD-10-CM: I08.0  ICD-9-CM: 396.2  8/1/2017 Yes              BACKGROUND:    Past Medical History:   Past Medical History:   Diagnosis Date    Ill-defined condition     synope         Patient taking anticoagulants yes     ASSESSMENT:    Changes in Assessment Throughout Shift: surgery to left hip with pinning      Patient has Central Line: no    Patient has Reed Cath: yes Reasons if yes: surgery      Last Vitals:     Vitals:    11/19/17 1603 11/19/17 1605 11/19/17 1606 11/19/17 1622   BP:    127/52   Pulse: 70 66  90   Resp: 19 17 18   Temp:   97.4 °F (36.3 °C) 97.4 °F (36.3 °C)   SpO2: 100% 100% 100% (!) 1%   Weight:            IV and DRAINS (will only show if present)   Peripheral IV 11/19/17 Right Forearm-Site Assessment: Clean, dry, & intact     WOUND (if present)   Wound Type:  3 dressing to left hip   Dressing present Dressing Present : No   Wound Concerns/Notes:  none     PAIN    Pain Assessment    Pain Intensity 1: 0 (11/19/17 1609)              Patient Stated Pain Goal: 0  o Interventions for Pain:  none  o Intervention effective: yes  o Time of last intervention: see mar pt denies pain   o Reassessment Completed: no      Last 3 Weights:  Last 3 Recorded Weights in this Encounter 11/19/17 0941   Weight: 44 kg (97 lb)     Weight change:      INTAKE/OUPUT    Current Shift: 11/19 0701 - 11/19 1900  In: 500 [I.V.:500]  Out: 230 [Urine:200]    Last three shifts:       LAB RESULTS     Recent Labs      11/19/17   1005   WBC  4.9   HGB  11.7*   HCT  35.6   PLT  155        Recent Labs      11/19/17   1715  11/19/17   1005   NA  141  143   K  5.1  4.3   GLU  148*  104*   BUN  34*  35*   CREA  0.97  0.93   CA  8.6  9.1   INR   --   0.9       RECOMMENDATIONS AND DISCHARGE PLANNING     1. Pending tests/procedures/ Plan of Care or Other Needs: no     2. Discharge plan for patient and Needs/Barriers: no    3. Estimated Discharge Date: n/a Posted on Whiteboard in Patients Room: no      4. The patient's care plan was reviewed with the oncoming nurse. \"HEALS\" SAFETY CHECK      Fall Risk    Total Score: 4    Safety Measures: Safety Measures: Bed/Chair alarm on, Bed/Chair-Wheels locked, Bed in low position, Call light within reach, Fall prevention (comment), Gripper socks, Family at bedside, Side rails X 3    A safety check occurred in the patient's room between off going nurse and oncoming nurse listed above. The safety check included the below items  Area Items   H  High Alert Medications - Verify all high alert medication drips (heparin, PCA, etc.)   E  Equipment - Suction is set up for ALL patients (with yanker)  - Red plugs utilized for all equipment (IV pumps, etc.)  - WOWs wiped down at end of shift.  - Room stocked with oxygen, suction, and other unit-specific supplies   A  Alarms - Bed alarm is set for fall risk patients  - Ensure chair alarm is in place and activated if patient is up in a chair   L  Lines - Check IV for any infiltration  - Reed bag is empty if patient has a Reed   - Tubing and IV bags are labeled   S  Safety   - Room is clean, patient is clean, and equipment is clean. - Hallways are clear from equipment besides carts.    - Fall bracelet on for fall risk patients  - Ensure room is clear and free of clutter  - Suction is set up for ALL patients (with erickson)  - Hallways are clear from equipment besides carts.    - Isolation precautions followed, supplies available outside room, sign posted     Sam Pritchett

## 2017-11-19 NOTE — IP AVS SNAPSHOT
Be Henriquez 
 
 
 [address] Patient: EULALIA [facility] MRN: [id number] VQX:[birth year] My Medications STOP taking these medications   
 acetaminophen-codeine 300-30 mg per tablet Commonly known as:  TYLENOL-CODEINE #3  
   
  
 cyclobenzaprine 5 mg tablet Commonly known as:  FLEXERIL  
   
  
  
TAKE these medications as instructed Instructions Each Dose to Equal  
 Morning Noon Evening Bedtime  
 acetaminophen 325 mg tablet Commonly known as:  TYLENOL Your last dose was: Your next dose is: Take 2 Tabs by mouth every six (6) hours as needed for Pain or Fever. Indications: Pain 650 mg  
    
   
   
   
  
 bisacodyl 5 mg EC tablet Commonly known as:  DULCOLAX Your last dose was: Your next dose is: Take 2 Tabs by mouth daily as needed for Constipation. 10 mg HYDROcodone-acetaminophen 5-325 mg per tablet Commonly known as:  Arevalo Minor Your last dose was: Your next dose is: Take 1 Tab by mouth every four (4) hours as needed. Max Daily Amount: 6 Tabs. For pain unrelieved by Tylenol. 1 Tab Where to Get Your Medications Information on where to get these meds will be given to you by the nurse or doctor. ! Ask your nurse or doctor about these medications  
  acetaminophen 325 mg tablet  
 bisacodyl 5 mg EC tablet HYDROcodone-acetaminophen 5-325 mg per tablet

## 2017-11-19 NOTE — ROUTINE PROCESS
Patient arrived from PACU in stable condition, Alert and oriented x 4. awake, Speech is clear. Clear lung Denies any acute distress, SOB or Chest pain. Patient is on 2 Of 02 NC. Left hip and leg has 3 dressing with 4 4 guaze and transparent film. Left upper dressing has slight serosanguineous drainage and 2 lower dressing is CDI,  Patient is oriented to the room, call bell use. Bed in low position. Will continue to monitor.

## 2017-11-19 NOTE — ED NOTES
TRANSFER - OUT REPORT:    Verbal report given to Janina Porras RN(name) on [facility]  being transferred to [address] [place] [address](unit) for routine progression of care       Report consisted of patients Situation, Background, Assessment and   Recommendations(SBAR). Information from the following report(s) SBAR was reviewed with the receiving nurse. Lines:   Peripheral IV 11/19/17 Right Forearm (Active)   Site Assessment Clean, dry, & intact 11/19/2017 10:12 AM   Phlebitis Assessment 0 11/19/2017 10:12 AM   Infiltration Assessment 0 11/19/2017 10:12 AM   Dressing Status Clean, dry, & intact 11/19/2017 10:12 AM   Dressing Type Transparent 11/19/2017 10:12 AM   Hub Color/Line Status Pink;Flushed;Patent 11/19/2017 10:12 AM   Action Taken Blood drawn 11/19/2017 10:12 AM        Opportunity for questions and clarification was provided.       Patient transported with:   [employer] NOW

## 2017-11-19 NOTE — H&P
History and Physical    Kerry Barraza  YOB: 1925,  80y.o. years old  MRN: [id number]   CSN: [id number]  Admission Date: 11/19/2017  Primary Care Provider: Gregory Cisneros MD      CHIEF COMPLAINT: Mechanical fall with hip pain and blunt head trauma. HISTORY OF PRESENT ILLNESS:  Ms. Sandro Castañeda is a 80 y.o. demented  female and [facility] resident with aortic stenosis, who presents for evaluation of head contusion and left hip pain  status post mechanical fall 3 prior. She denies syncope, localizing neurologic abnormality and additional injury. Findings reflect bland serum chemistry and hemogram; there ER physician reports ortho request for admission to hospitalist service and is planning surgical reduction of hip fracture 11/19 or 11/20. Past Medical History:   Diagnosis Date    Ill-defined condition     synope          Past Surgical History:   Procedure Laterality Date    HX CHOLECYSTECTOMY  6/2015    HX HEMORRHOIDECTOMY      HX HERNIA REPAIR  9/4476    umbilical    HX HYSTERECTOMY           MEDICATION     Current Outpatient Prescriptions   Medication Sig Dispense Refill    acetaminophen-codeine (TYLENOL-CODEINE #3) 300-30 mg per tablet Take 1 Tab by mouth every four (4) hours as needed for Pain. Max Daily Amount: 6 Tabs. 12 Tab 0    cyclobenzaprine (FLEXERIL) 5 mg tablet Take 1 Tab by mouth three (3) times daily as needed for Muscle Spasm(s). Indications: MUSCLE SPASM 15 Tab 0    acetaminophen (TYLENOL) 325 mg tablet Take  by mouth every four (4) hours as needed for Pain. ALLERGIES     Allergies   Allergen Reactions    Erythromycin Itching       FAMILY HISTORY   family history includes Heart Disease in her mother; Hypertension in her mother; Lung Disease in her father. There is no history of Stroke, Diabetes, or Cancer. SOCIAL HISTORY    reports that she quit smoking about 50 years ago. She has a 22.00 pack-year smoking history.  She has never used smokeless tobacco. She reports that she drinks alcohol. She reports that she does not use illicit drugs. REVIEW OF SYSTEMS   14 point review of systems was not undertaken as the patient is demented and unreliable. OBJECTIVE   PHYSICAL EXAMINATION   Temp:  [98.2 °F (36.8 °C)]   Pulse (Heart Rate):  [58-64]   BP: (146-164)/(53-66)   Resp Rate:  [16]   O2 Sat (%):  [96 %-99 %]   Weight:  [44 kg (97 lb)]      GENERAL APPEARANCE. Clean, adequately nourished, cooperative. EYES. Inspection of Conjunctiva and Lids:  Clear without icterus; conjunctiva pink; lids are normal.  Examination of Pupils and Irises. Pupils round/appropriate for history, normal light reactive. EARS, NOSE, MOUTH, AND THROAT. External Inspection of Ears and Nose. No scars, lesions, deformities or masses. Assessment of Hearing:  Within normal limits for forced whisper at 5 feet. Inspection of Nasal Mucosa, Septum and Turbinates: Nares patent. Inspection of Lips, Teeth and Gums. No infected gums or abscesses. Dentition is suboptimal.  Oropharnyx:   Patent without evidence of airway obstruction. Subglossal vessels are without pallor. NECK. Supple, no masses, trachea is midline and freely moveable. Thyroid:  No nodules or tenderness noted. RESPIRATORY/CHEST. Assessment of Respiratory Effort. Chest moving equally on both sides. No retraction or stridor. There is no deformity considered to be clinically obstructive or restrictive. Auscultation of Lungs. Breath sounds are well heard. No wheezing, tubular sounds, crackles, or rubs noted posteriorly or anteriorly. The expiratory to inspiratory ratio is normal at approximately 2:1.       CARDIOVASCULAR. Ascultation of Heart. Regular rate and rhythm with pathologic murmur, normal S1 S2, negative rubs, clicks or gallops. Palpation of Heart:  PMI non-displace. There are no precordial thrills or knocks. Carotid Arteries. No bruits or thrills appreciated.  The upstroke is blunted and symmetrical, bilaterally. Abdominal Aorta. No bruits or pastille masses palpated. Femoral Arteries. Deferred. Pedal Pulses. intact. Edema and/or Varicosities. Negative for significant edema and painful varicosities. No cords palpated. GASTROINTESTINAL. Abdomen:  Soft, without tenderness or masses. Bowel sounds present. Liver and Spleen:  No organomegaly or tenderness. LYMPATIC. Palpation of Lymph Nodes. Neck. No masses or tenderness noted. MUSCULOSKELETAL (FINDINGS CONSIDER AGE). Per ortho. SKIN. Skin and Subcutaneous Tissue. No reported unexplained rashes, lesions, ulcers or bruising noted. Palpation of Skin and Subcutaneous Tissue. No indurations, nodules or tightening noted. NEUROLOGIC. MOTOR: Motor strength is symmetrical at 3-4/5 in the upper extremities. Gait is unevaluated. PSYCHIATRIC. Patient's judgment and insight. Abormal for everyday activities, social activities and self awareness. Questions are answered appropriately. Intellectual function appears abnormal with no concretion or forced speech. Orientation to time, place and person. Oriented to place and person. Recent and remote memory. Normal ability to recall antecedent conversation; not intact for both long and short term. Mood and affect. Appropriate affect; does not appear depressed, anxious or agitated at time of exam.        All labs last day  Admission on 11/19/2017   Component Date Value    WBC 11/19/2017 4.9     RBC 11/19/2017 3.68*    HGB 11/19/2017 11.7*    HCT 11/19/2017 35.6     MCV 11/19/2017 96.7     MCH 11/19/2017 31.8     MCHC 11/19/2017 32.9     RDW 11/19/2017 14.6*    PLATELET 33/00/5337 375     MPV 11/19/2017 10.3     NEUTROPHILS 11/19/2017 62     LYMPHOCYTES 11/19/2017 26     MONOCYTES 11/19/2017 9     EOSINOPHILS 11/19/2017 2     BASOPHILS 11/19/2017 1     ABS. NEUTROPHILS 11/19/2017 3.1     ABS. LYMPHOCYTES 11/19/2017 1.3     ABS.  MONOCYTES 11/19/2017 0.4  ABS. EOSINOPHILS 11/19/2017 0.1     ABS. BASOPHILS 11/19/2017 0.0     DF 11/19/2017 AUTOMATED     Sodium 11/19/2017 143     Potassium 11/19/2017 4.3     Chloride 11/19/2017 107     CO2 11/19/2017 28     Anion gap 11/19/2017 8     Glucose 11/19/2017 104*    BUN 11/19/2017 35*    Creatinine 11/19/2017 0.93     BUN/Creatinine ratio 11/19/2017 38*    GFR est AA 11/19/2017 >60     GFR est non-AA 11/19/2017 56*    Calcium 11/19/2017 9.1     Bilirubin, total 11/19/2017 0.5     ALT (SGPT) 11/19/2017 23     AST (SGOT) 11/19/2017 22     Alk. phosphatase 11/19/2017 66     Protein, total 11/19/2017 7.8     Albumin 11/19/2017 3.9     Globulin 11/19/2017 3.9     A-G Ratio 11/19/2017 1.0     Crossmatch Expiration 11/19/2017 11/22/2017     ABO/Rh(D) 11/19/2017 O POSITIVE     Antibody screen 11/19/2017 NEG     Prothrombin time 11/19/2017 11.6     INR 11/19/2017 0.9     CK 11/19/2017 41     CK - MB 11/19/2017 1.0     CK-MB Index 11/19/2017 2.4     Troponin-I, Qt. 11/19/2017 <0.02        ASSESSMENT & PLAN   Principal Problem:    Subtrochanteric fracture ([address] (11/19/2017): Specialty participation and management ortho    Active Problems:    Mitral regurgitation and aortic stenosis: Cautious systemic volume management and afterload reduction. 09/01/2017 TTE: SUMMARY: Left ventricle: Systolic function was normal by EF (biplane method of disks).  Ejection fraction was estimated to be 60 %. No obvious wall motion abnormalities identified in the views obtained. Mitral valve: There was a prolapse involving the posterior leaflet. There was  severe regurgitation. Aortic valve: Leaflets exhibited markedly increased thickness and reduced  mobility. There was moderate to severe stenosis. Valve peak gradient was 57 mmHg. Valve mean gradient was 36 mmHg. Estimated aortic valve area (by VTI) was 0.74 cm-sq. Tricuspid valve: There was mild to moderate regurgitation.            Laura Barbosa   11/19/2017  11:17 AM

## 2017-11-19 NOTE — ROUTINE PROCESS
Patient arrived from ED in stable condition, Alert and oriented x 3 with intermittent confusion. , Speech is clear. Clear lungs, left forehead has a greenish blushish hemotoma. Pt is [place] per report. Denies any acute distress, SOB or Chest pain. Reed in place, Pt c/o pain to left Hip. Patient is on room air. Patient is oriented to the room, call bell use. Bed in low position. Will continue to monitor.

## 2017-11-20 NOTE — PROGRESS NOTES
Problem: Falls - Risk of  Goal: *Absence of Falls  Document Simran Fall Risk and appropriate interventions in the flowsheet.    Outcome: Progressing Towards Goal  Fall Risk Interventions:  Mobility Interventions: Patient to call before getting OOB, PT Consult for mobility concerns, PT Consult for assist device competence    Mentation Interventions: Adequate sleep, hydration, pain control, Door open when patient unattended, Reorient patient    Medication Interventions: Bed/chair exit alarm, Patient to call before getting OOB, Teach patient to arise slowly    Elimination Interventions: Call light in reach, Bed/chair exit alarm, Patient to call for help with toileting needs    History of Falls Interventions: Bed/chair exit alarm, Door open when patient unattended, Room close to nurse's station

## 2017-11-20 NOTE — PROGRESS NOTES
Problem: Mobility Impaired (Adult and Pediatric)  Goal: *Acute Goals and Plan of Care (Insert Text)  Physical Therapy Goals  Initiated 11/20/2017 and to be accomplished within 7 day(s)  1. Patient will move from supine to sit and sit to supine  in bed with minimal assistance/contact guard assist.     2.  Patient will transfer from bed to chair and chair to bed with minimal assistance/contact guard assist using the least restrictive device. 3.  Patient will perform sit to stand with minimal assistance/contact guard assist.  4.  Patient will ambulate with minimal assistance/contact guard assist for 50 feet with the least restrictive device. physical Therapy EVALUATION    Patient: Milton Hand (80 y.o. female)  Date: 11/20/2017  Primary Diagnosis: Subtrochanteric fracture (HCC)  Intertrochanteric fracture of left hip (HCC)  left hip fracture  Procedure(s) (LRB):  FEMORAL INTERTROCHANTERIC NAIL INSERTION (Left) 1 Day Post-Op   Precautions: fall, WBAT L LE       ASSESSMENT :  Based on the objective data described below, the patient presents with decreased strength, balance and activity tolerance and increased pain resulting in decreased independence with functional mobility. Pt required mod A for supine to sit transfer. Pt performed sit to stand from the edge of the bed with mod A. Pt ambulated 3 shuffling feet with mod A and RW from the bed to the recliner. Pt was left sitting in recliner with needs in reach and nursing notified. Patient will benefit from skilled intervention to address the above impairments.   Patients rehabilitation potential is considered to be Good  Factors which may influence rehabilitation potential include:   []         None noted  []         Mental ability/status  []         Medical condition  []         Home/family situation and support systems  []         Safety awareness  []         Pain tolerance/management  []         Other:      PLAN :  Recommendations and Planned Interventions:  []           Bed Mobility Training             []    Neuromuscular Re-Education  []           Transfer Training                   []    Orthotic/Prosthetic Training  []           Gait Training                          []    Modalities  []           Therapeutic Exercises          []    Edema Management/Control  []           Therapeutic Activities            []    Patient and Family Training/Education  []           Other (comment):    Frequency/Duration: Patient will be followed by physical therapy 1-2 times per day/4-7 days per week to address goals. Discharge Recommendations: [facility]  Further Equipment Recommendations for Discharge: N/A     G-CODES      Mobility  Current  CK= 40-59%   Goal  CJ= 20-39%. The severity rating is based on the Level of Assistance required for Functional Mobility and ADLs. Eval Complexity: History: MEDIUM  Complexity : 1-2 comorbidities / personal factors will impact the outcome/ POC Exam:LOW Complexity : 1-2 Standardized tests and measures addressing body structure, function, activity limitation and / or participation in recreation  Presentation: MEDIUM Complexity : Evolving with changing characteristics  Clinical Decision Making:Low Complexity , Overall Complexity:LOW     SUBJECTIVE:   Patient stated [address].     OBJECTIVE DATA SUMMARY:     Past Medical History:   Diagnosis Date    Ill-defined condition     synope     Past Surgical History:   Procedure Laterality Date    HX CHOLECYSTECTOMY  6/2015    HX HEMORRHOIDECTOMY      HX HERNIA REPAIR  7/7802    umbilical    HX HYSTERECTOMY       Prior Level of Function/Home Situation: unknown if patient used a assistive device prior to admission  Home Situation  Home Environment: Skilled nursing facility  One/Two Story Residence: One story  Living Alone: No  Support Systems: Skilled nursing facility  Patient Expects to be Discharged to[de-identified] [facility]  Current DME Used/Available at Home: None  Critical Behavior:  Neurologic State: Alert;Confused  Orientation Level: Disoriented X4  Cognition: Decreased attention/concentration; Follows commands     Strength:    Strength: Within functional limits (R LE, decreased L LE due to pain)      Tone & Sensation:   Tone: Normal       Range Of Motion:  AROM: Within functional limits (R LE, decreased L LE)      Functional Mobility:  Bed Mobility:  Supine to Sit: Minimum assistance; Moderate assistance     Transfers:  Sit to Stand: Moderate assistance  Stand to Sit: Moderate assistance  Bed to Chair: Moderate assistance     Balance:   Sitting - Static: Good (unsupported)  Sitting - Dynamic: Fair (occasional)  Standing: With support  Standing - Static:  (fair-)  Standing - Dynamic :  (poor+)  Ambulation/Gait Training:  Distance (ft): 3 Feet (ft)  Assistive Device: Walker, rolling  Ambulation - Level of Assistance: Moderate assistance  Gait Abnormalities: Decreased step clearance;Shuffling gait  Therapeutic Exercises: Ankle pumps, heel slides  Pain:  Pt reports 2/10 pain or discomfort prior to treatment.    Pt reports 4/10 pain or discomfort post treatment. Activity Tolerance:   Fair-  Please refer to the flowsheet for vital signs taken during this treatment. After treatment:   [x]         Patient left in no apparent distress sitting up in chair  []         Patient left in no apparent distress in bed  [x]         Call bell left within reach  [x]         Nursing notified  []         Caregiver present  []         Bed alarm activated    COMMUNICATION/EDUCATION:   [x]         Fall prevention education was provided and the patient/caregiver indicated understanding. [x]         Patient/family have participated as able in goal setting and plan of care. [x]         Patient/family agree to work toward stated goals and plan of care. []         Patient understands intent and goals of therapy, but is neutral about his/her participation.   []         Patient is unable to participate in goal setting and plan of care.   Educated patient on calling for assistance to get up    Thank you for this referral.  Jeanne Fregoso, PT   Time Calculation: 23 mins

## 2017-11-20 NOTE — ROUTINE PROCESS
Patient stable, no signs of distress. Patient confused this morning and uncooperative. Patient has calmed down. Patient not in any pain. Patients dressing saturated. Changed per order. Pt in to work with patient, patient up in chair. Patient has not voided. Patient bladder scanned. 82 ml in bladder. Dr. Ivon vieira, said to watch patient to see if she voids if not to let him know.

## 2017-11-20 NOTE — PROGRESS NOTES
2150  Received pt in stable condition,   General: lying in bed in supine, not apparent distress, 0 pain, voiced no compliants at this time. Neuro: Alert to self and place, disoriented to situation, denies numbness, 0 tingling, able to wiggle toes to bilateral extremities. Reorient patient. Cardio: pedal pulses palpable, denies chest pain  Respiratory: in room air, denies shortness of breath  Skin: Dressing to left hip clean, dry and intact, except on right upper dressing with medium serosanguinous drainage. Bruise to left forehead noted. GI: powers, clear, yellow urine, no odor  : denies nausea and vomiting  Mus: bed rest  Bed in low position, bed alarm activated and call bell within reach. 0005  Patient alert, NAD, refused vital signs, to have her dressing be changed and to placed cold compress to left forehead. Patient stated \"I don't want to be bothered. \" Call bell in reach and bed alarm is on.     0104  Dressing changed to left upper hip with 4x4 and transparent dressing, cleanse area with normal saline. Pt tolerated well. In stable condition.

## 2017-11-20 NOTE — PROGRESS NOTES
Problem: Falls - Risk of  Goal: *Absence of Falls  Document Simran Fall Risk and appropriate interventions in the flowsheet.    Outcome: Progressing Towards Goal  Fall Risk Interventions:       Mentation Interventions: Bed/chair exit alarm, Door open when patient unattended, Reorient patient    Medication Interventions: Bed/chair exit alarm, Teach patient to arise slowly, Patient to call before getting OOB    Elimination Interventions: Call light in reach, Bed/chair exit alarm, Patient to call for help with toileting needs    History of Falls Interventions: Bed/chair exit alarm, Door open when patient unattended

## 2017-11-20 NOTE — PROGRESS NOTES
Problem: Self Care Deficits Care Plan (Adult)  Goal: *Acute Goals and Plan of Care (Insert Text)  Occupational Therapy Goals  Initiated 11/20/2017 within 7 day(s). 1.  Patient will perform grooming with supervision/set-up   2. Patient will perform upper body dressing with supervision/set-up. 3.  Patient will perform lower body dressing with moderate assistance . 4. Patient will perform BSC transfers with minimal assistance/contact guard assist.  5.  Patient will perform all aspects of toileting with minimal assistance/contact guard assist.  6.  Patient will participate in upper extremity therapeutic exercise/activities with supervision/set-up in preparation for self care tasks  Outcome: Progressing Towards Goal  Occupational Therapy EVALUATION    Patient: Candice Wang (80 y.o. female)  Date: 11/20/2017  Primary Diagnosis: Subtrochanteric fracture (HCC)  Intertrochanteric fracture of left hip (HCC)  left hip fracture  Procedure(s) (LRB):  FEMORAL INTERTROCHANTERIC NAIL INSERTION (Left) 1 Day Post-Op   Precautions:   Fall, WBAT (LLE)    ASSESSMENT :  Based on the objective data described below, the patient was in bed upon arrival. Patient oriented to person only, very pleasant and willing to participate in OT. Patient has decreased but functional BUE AROM and strength. Patient needed mod A during bed mobility and mod A to simulate BSC transfer with rolling walker. Patient needed mod verbal cues for following commands, minimal pain during mobility noted. Patients' nurse notified of patient being in chair/drainage noted from dressing. Patient left comfortable in no distress. Patient will benefit from skilled intervention to address the above impairments.   Patients rehabilitation potential is considered to be Good  Factors which may influence rehabilitation potential include:   []             None noted  []             Mental ability/status  [x]             Medical condition  []             Home/family situation and support systems  []             Safety awareness  []             Pain tolerance/management  []             Other:      PLAN :  Recommendations and Planned Interventions:  [x]               Self Care Training                  [x]        Therapeutic Activities  [x]               Functional Mobility Training    []        Cognitive Retraining  [x]               Therapeutic Exercises           []        Endurance Activities  [x]               Balance Training                   []        Neuromuscular Re-Education  []               Visual/Perceptual Training     [x]   Home Safety Training  [x]               Patient Education                 []        Family Training/Education  []               Other (comment):    Frequency/Duration: Patient will be followed by occupational therapy 1-2 times per day/4-7 days per week to address goals. Discharge Recommendations: Omid Lala  Further Equipment Recommendations for Discharge: TBD     Barriers to Learning/Limitations: yes;  cognitive and physical  Compensate with: visual, verbal, tactile, kinesthetic cues/model     PATIENT COMPLEXITY      Eval Complexity: History: MEDIUM Complexity : Expanded review of history including physical, cognitive and psychosocial  history ; Examination: MEDIUM Complexity : 3-5 performance deficits relating to physical, cognitive , or psychosocial skils that result in activity limitations and / or participation restrictions; Decision Making:MEDIUM Complexity : Patient may present with comorbidities that affect occupational performnce. Miniml to moderate modification of tasks or assistance (eg, physical or verbal ) with assesment(s) is necessary to enable patient to complete evaluation  Assessment: Moderate Complexity     G-CODES:     Self Care  Current  CL= 60-79%   Goal  CJ= 20-39%. The severity rating is based on the Level of Assistance required for Functional Mobility and ADLs.     SUBJECTIVE:   Patient stated I'm here for you.  (when asked if she knew where she was)    OBJECTIVE DATA SUMMARY:     Past Medical History:   Diagnosis Date    Ill-defined condition     synope     Past Surgical History:   Procedure Laterality Date    HX CHOLECYSTECTOMY  6/2015    HX HEMORRHOIDECTOMY      HX HERNIA REPAIR  6/7065    umbilical    HX HYSTERECTOMY       Prior Level of Function/Home Situation: Patient unable to report PLOF, secondary to confusion    Home Situation  Home Environment: Skilled nursing facility  One/Two Story Residence: One story  Living Alone: No  Support Systems: Skilled nursing facility  Patient Expects to be Discharged to[de-identified] Skilled nursing facility  Current DME Used/Available at Home: None  [x]  Right hand dominant   []  Left hand dominant  Cognitive/Behavioral Status:  Neurologic State: Alert  Orientation Level: Oriented to person;Disoriented to time;Disoriented to place; Disoriented to situation  Cognition: Decreased command following;Decreased attention/concentration    Skin: No skin changes noted    Edema: No edema noted    Vision/Perceptual:       Acuity: Within Defined Limits     Coordination:  Coordination: Within functional limits (BUEs)       Balance:  Sitting: Impaired; With support  Sitting - Static: Good (unsupported)  Sitting - Dynamic: Fair (occasional)  Standing: Impaired; With support  Standing - Static: Fair  Standing - Dynamic : Poor    Strength:  Strength: Generally decreased, functional (BUEs)     Tone & Sensation:  Tone: Normal (BUEs)  Sensation: Intact (BUEs)     Range of Motion:  AROM: Generally decreased, functional (BUEs)     Functional Mobility and Transfers for ADLs:  Bed Mobility:  Supine to Sit: Moderate assistance  Scooting: Moderate assistance  Transfers:  Sit to Stand: Moderate assistance    Toilet Transfer : Moderate assistance (simulated BSC transfer )      ADL Assessment:(clinical judgement)  Feeding: Supervision;Setup    Oral Facial Hygiene/Grooming: Minimum assistance; Moderate assistance    Bathing: Maximum assistance    Upper Body Dressing: Maximum assistance    Lower Body Dressing: Maximum assistance    Toileting: Maximum assistance     Pain:  Pt reports 0/10 pain or discomfort prior to treatment.    Pt reports 0/10 pain or discomfort post treatment. Activity Tolerance:   Fair    Please refer to the flowsheet for vital signs taken during this treatment. After treatment:   [x] Patient left in no apparent distress sitting up in chair  [] Patient left in no apparent distress in bed  [x] Call bell left within reach  [x] Nursing notified  [] Caregiver present  [] Bed alarm activated    COMMUNICATION/EDUCATION:   [] Home safety education was provided and the patient/caregiver indicated understanding. [] Patient/family have participated as able in goal setting and plan of care. [] Patient/family agree to work toward stated goals and plan of care. [] Patient understands intent and goals of therapy, but is neutral about his/her participation. [x] Patient is unable to participate in goal setting and plan of care.     Thank you for this referral.  Carolina Gilman, OTR/L  Time Calculation: 18 mins

## 2017-11-20 NOTE — PROGRESS NOTES
conducted an initial consultation and Spiritual Assessment for [facility], who is a 80 y.o.,female. Patients Primary Language is: [place]. According to the patients EMR Restorationist Affiliation is: Hinduism. The reason the Patient came to the hospital is:   Patient Active Problem List    Diagnosis Date Noted    Subtrochanteric fracture ([facility] [address] 11/19/2017    Intertrochanteric fracture of left hip ([facility] [address] 11/19/2017    Mitral regurgitation and aortic stenosis 08/01/2017    RLQ abdominal pain 06/05/2015    Appendicitis 06/05/2015        The  provided the following Interventions:  Initiated a relationship of care and support. Patient attempted to explored issues of stephanie, belief, spirituality and Buddhism/ritual needs while the  visited. Listened empathically. Provided information about Spiritual Care Services. Offered prayer and assurance of continued prayers on patient's behalf. Chart reviewed. The following outcomes where achieved:  Patient shared limited information about both their medical narrative and spiritual journey/beliefs. Patient processed feeling about current hospitalization and stephanie that she will recover. Patient expressed gratitude for 's visit. Assessment:  Patient does not have any Buddhism/cultural needs that will affect patients preferences in health care. There are no spiritual or Buddhism issues which require intervention at this time. Plan:  Chaplains will continue to follow and will provide pastoral care on an as needed/requested basis.  recommends bedside caregivers page  on duty if patient shows signs of acute spiritual or emotional distress.     Chaplain Resident, [address]   [phone]

## 2017-11-20 NOTE — PROGRESS NOTES
Bedside and Verbal shift change report given to Ashia Shultz RN (oncoming nurse) by Janeen Smith RN (offgoing nurse). Report included the following information SBAR, Kardex, MAR and Recent Results.     SITUATION:    Code Status: Full Code   Reason for Admission: Subtrochanteric fracture ([address]   Intertrochanteric fracture of left hip ([address]   left hip fracture    [facility] day: 1   Problem List:       Hospital Problems  Date Reviewed: 11/19/2017          Codes Class Noted POA    * (Principal)Subtrochanteric fracture ([facility] [address] ICD-10-CM: S74.01ZR  ICD-9-CM: 820.22  11/19/2017 Yes        Intertrochanteric fracture of left hip ([facility] [address] ICD-10-CM: Q44.056R  ICD-9-CM: 820.21  11/19/2017 Unknown        Mitral regurgitation and aortic stenosis ICD-10-CM: I08.0  ICD-9-CM: 396.2  8/1/2017 Yes              BACKGROUND:    Past Medical History:   Past Medical History:   Diagnosis Date    Ill-defined condition     synope         Patient taking anticoagulants no ; held    ASSESSMENT:    Changes in Assessment Throughout Shift: low Hgb; transfuse 1 unit RBCs     Patient has Central Line: no Reasons if yes:      Patient has Reed Cath: no Reasons if yes:          Last Vitals:     Vitals:    11/19/17 2035 11/20/17 0420 11/20/17 0608 11/20/17 0646   BP:  94/49 112/42 115/52   Pulse:  83 (!) 50 (!) 56   Resp:  16 15 16   Temp:  96.9 °F (36.1 °C) 97.6 °F (36.4 °C) 97.7 °F (36.5 °C)   SpO2: 99% 99% 96% 96%   Weight:            IV and DRAINS (will only show if present)   Peripheral IV 11/19/17 Right Wrist-Site Assessment: Clean, dry, & intact  [REMOVED] Peripheral IV 11/19/17 Right Forearm-Site Assessment: Clean, dry, & intact     WOUND (if present)   Wound Type:  surgical   Dressing present yes   Wound Concerns/Notes:  none     PAIN    Pain Assessment    Pain Intensity 1: 0 (11/20/17 0425)              Patient Stated Pain Goal: 0  o Interventions for Pain:  medication  o Intervention effective: yes  o Time of last intervention: see mar   o Reassessment Completed: yes      Last 3 Weights:  Last 3 Recorded Weights in this Encounter    11/19/17 0941   Weight: 44 kg (97 lb)     Weight change:      INTAKE/OUPUT    Current Shift: 11/19 1901 - 11/20 0700  In: 833.8 [P.O.:320; I.V.:513.8]  Out: 400 [Urine:400]    Last three shifts: 11/18 0701 - 11/19 1900  In: 500 [I.V.:500]  Out: 230 [Urine:200]     LAB RESULTS     Recent Labs      11/20/17   0420  11/19/17   1005   WBC  7.5  4.9   HGB  7.2*  11.7*   HCT  22.7*  35.6   PLT  132*  155        Recent Labs      11/20/17 0420 11/19/17   1715  11/19/17   1005   NA  139  141  143   K  4.7  5.1  4.3   GLU  141*  148*  104*   BUN  38*  34*  35*   CREA  1.16  0.97  0.93   CA  7.9*  8.6  9.1   INR   --    --   0.9       RECOMMENDATIONS AND DISCHARGE PLANNING     1. Pending tests/procedures/ Plan of Care or Other Needs: labs, PT/OT, complete care     2. Discharge plan for patient and Needs/Barriers:     3. Estimated Discharge Date: 11/22/17 Posted on Whiteboard in [address] [place] [address] Room: yes      4. The patient's care plan was reviewed with the oncoming nurse. \"HEALS\" SAFETY CHECK      Fall Risk    Total Score: 4    Safety Measures: Safety Measures: Bed/Chair-Wheels locked, Bed in low position, Caregiver at bedside, Call light within reach, Gripper socks, Side rails X 3    A safety check occurred in the patient's room between off going nurse and oncoming nurse listed above.     The safety check included the below items  Area Items   H  High Alert Medications - Verify all high alert medication drips (heparin, PCA, etc.)   E  Equipment - Suction is set up for ALL patients (with yanker)  - Red plugs utilized for all equipment (IV pumps, etc.)  - WOWs wiped down at end of shift.  - Room stocked with oxygen, suction, and other unit-specific supplies   A  Alarms - Bed alarm is set for fall risk patients  - Ensure chair alarm is in place and activated if patient is up in a chair   L  Lines - Check IV for any infiltration  - Reed bag is empty if patient has a Reed   - Tubing and IV bags are labeled   S  Safety   - Room is clean, patient is clean, and equipment is clean. - Hallways are clear from equipment besides carts. - Fall bracelet on for fall risk patients  - Ensure room is clear and free of clutter  - Suction is set up for ALL patients (with domenicoker)  - Hallways are clear from equipment besides carts.    - Isolation precautions followed, supplies available outside room, sign posted     Bubba Marks RN

## 2017-11-20 NOTE — PROGRESS NOTES
[facility]  Progress Note    Patient: Miracle Jordan Age: 80 y.o. : 4/3/1925 MR#: [id number] SSN: [id number]  Date: 2017     Subjective:     Awake, pleasant, conversant. Is Ox1 (name), but confused about place (believes she's in [place], year is 200). Follows commands appropriately. Seen with nursing staff @ bedside. Wound oozing blood very slowly. Denies pain, SOB, F/C, N/V, CP. Assessment/Plan:   1. L hip fx s/p repair - ORIF on . Continue post-op care. Lovenox held for bleeding. Has no pain c/o. To be xfused 1u PRBC. 2. Acute post-op blood loss anemia - hemodynamically stable. Holding Lovenox today. To xfuse 1u PRBC, following. 3. AStenosis - by hx. No acute issues. Last echo with EF 60%, no obvious wall motion abnml, AValve mean gradient 36mmHg, estamated valve area 0.75cm^2. Seen by cardiology 2017, no indication for valve replacement. Outpt f/u.  4. Fall precautions. Additional Notes:      Case discussed with:  [x]Patient  []Family  [x]Nursing  []Case Management  DVT Prophylaxis:  [x]Lovenox  []Hep SQ  []SCDs  []Coumadin   []On Heparin gtt    Objective:   VS:   Visit Vitals    /49 (BP 1 Location: Left arm, BP Patient Position: At rest)    Pulse 80    Temp 97.3 °F (36.3 °C)    Resp 16    Wt 44 kg (97 lb)    SpO2 90%    BMI 18.94 kg/m2      Tmax/24hrs: Temp (24hrs), Av.4 °F (36.3 °C), Min:96.9 °F (36.1 °C), Max:97.7 °F (36.5 °C)    Intake/Output Summary (Last 24 hours) at 17 1247  Last data filed at 17 1000   Gross per 24 hour   Intake           2272.5 ml   Output              630 ml   Net           1642.5 ml       General:  AA&Ox1, NAD. Cardiovascular:  RRR. Pulmonary:  CTA B ant.   GI:  Soft, NT/ND, NABS. Extremities:  No CT or edema. L hip op site dressing off, dressing soaked with blood. Very slow oozing of blood from op site.    Additional:      Labs:    Recent Results (from the past 24 hour(s))   METABOLIC PANEL, BASIC    Collection Time: 11/19/17  5:15 PM   Result Value Ref Range    Sodium 141 136 - 145 mmol/L    Potassium 5.1 3.5 - 5.5 mmol/L    Chloride 109 (H) 100 - 108 mmol/L    CO2 24 21 - 32 mmol/L    Anion gap 8 3.0 - 18 mmol/L    Glucose 148 (H) 74 - 99 mg/dL    BUN 34 (H) 7.0 - 18 MG/DL    Creatinine 0.97 0.6 - 1.3 MG/DL    BUN/Creatinine ratio 35 (H) 12 - 20      GFR est AA >60 >60 ml/min/1.73m2    GFR est non-AA 54 (L) >60 ml/min/1.73m2    Calcium 8.6 8.5 - 10.1 MG/DL   CBC WITH AUTOMATED DIFF    Collection Time: 11/20/17  4:20 AM   Result Value Ref Range    WBC 7.5 4.6 - 13.2 K/uL    RBC 2.34 (L) 4.20 - 5.30 M/uL    HGB 7.2 (L) 12.0 - 16.0 g/dL    HCT 22.7 (L) 35.0 - 45.0 %    MCV 97.0 74.0 - 97.0 FL    MCH 30.8 24.0 - 34.0 PG    MCHC 31.7 31.0 - 37.0 g/dL    RDW 14.8 (H) 11.6 - 14.5 %    PLATELET 565 (L) 952 - 420 K/uL    MPV 10.1 9.2 - 11.8 FL    NEUTROPHILS 82 (H) 40 - 73 %    LYMPHOCYTES 9 (L) 21 - 52 %    MONOCYTES 9 3 - 10 %    EOSINOPHILS 0 0 - 5 %    BASOPHILS 0 0 - 2 %    ABS. NEUTROPHILS 6.1 1.8 - 8.0 K/UL    ABS. LYMPHOCYTES 0.7 (L) 0.9 - 3.6 K/UL    ABS. MONOCYTES 0.7 0.05 - 1.2 K/UL    ABS. EOSINOPHILS 0.0 0.0 - 0.4 K/UL    ABS.  BASOPHILS 0.0 0.0 - 0.1 K/UL    DF AUTOMATED     METABOLIC PANEL, BASIC    Collection Time: 11/20/17  4:20 AM   Result Value Ref Range    Sodium 139 136 - 145 mmol/L    Potassium 4.7 3.5 - 5.5 mmol/L    Chloride 107 100 - 108 mmol/L    CO2 23 21 - 32 mmol/L    Anion gap 9 3.0 - 18 mmol/L    Glucose 141 (H) 74 - 99 mg/dL    BUN 38 (H) 7.0 - 18 MG/DL    Creatinine 1.16 0.6 - 1.3 MG/DL    BUN/Creatinine ratio 33 (H) 12 - 20      GFR est AA 53 (L) >60 ml/min/1.73m2    GFR est non-AA 44 (L) >60 ml/min/1.73m2    Calcium 7.9 (L) 8.5 - 10.1 MG/DL       Signed By: Omar Powell MD     November 20, 2017 12:47 PM

## 2017-11-20 NOTE — ROUTINE PROCESS
Bedside and Verbal shift change report given to Nessa Aparicio (oncoming nurse) by Ashley Live (offgoing nurse). Report included the following information SBAR, Kardex, MAR and Recent Results.     SITUATION:    Code Status: Full Code   Reason for Admission: Subtrochanteric fracture ([address]   Intertrochanteric fracture of left hip ([address]   left hip fracture    [facility] day: 1   Problem List:       Hospital Problems  Date Reviewed: 11/19/2017          Codes Class Noted POA    * (Principal)Subtrochanteric fracture ([address] ICD-10-CM: T43.89CQ  ICD-9-CM: 820.22  11/19/2017 Yes        Intertrochanteric fracture of left hip ([address] ICD-10-CM: O97.741X  ICD-9-CM: 820.21  11/19/2017 Unknown        Mitral regurgitation and aortic stenosis ICD-10-CM: I08.0  ICD-9-CM: 396.2  8/1/2017 Yes              BACKGROUND:    Past Medical History:   Past Medical History:   Diagnosis Date    Ill-defined condition     synope         Patient taking anticoagulants no     ASSESSMENT:    Changes in Assessment Throughout Shift: No     Patient has Central Line: no Reasons if yes:    Patient has Reed Cath: no Reasons if yes:       Last Vitals:     Vitals:    11/20/17 0608 11/20/17 0646 11/20/17 1124 11/20/17 1616   BP: 112/42 115/52 125/49 (!) 115/33   Pulse: (!) 50 (!) 56 80 (!) 42   Resp: 15 16 16 22   Temp: 97.6 °F (36.4 °C) 97.7 °F (36.5 °C) 97.3 °F (36.3 °C) 98.7 °F (37.1 °C)   SpO2: 96% 96% 90% 93%   Weight:            IV and DRAINS (will only show if present)   Peripheral IV 11/19/17 Right Wrist-Site Assessment: Clean, dry, & intact  [REMOVED] Peripheral IV 11/19/17 Right Forearm-Site Assessment: Clean, dry, & intact     WOUND (if present)   Wound Type:  none, incision   Dressing present Dressing Present : No   Wound Concerns/Notes:  none     PAIN    Pain Assessment    Pain Intensity 1: 0 (11/20/17 4271)              Patient Stated Pain Goal: 0  o Interventions for Pain:  none  o Intervention effective: yes  o Time of last intervention: n/a   o Reassessment Completed: yes      Last 3 Weights:  Last 3 Recorded Weights in this Encounter    11/19/17 0941   Weight: 44 kg (97 lb)     Weight change:      INTAKE/OUPUT    Current Shift: 11/20 0701 - 11/20 1900  In: 560 [P.O.:120]  Out: -     Last three shifts: 11/18 1901 - 11/20 0700  In: 1712.5 [P.O.:320; I.V.:1392.5]  Out: 630 [Urine:600]     LAB RESULTS     Recent Labs      11/20/17 0420 11/19/17   1005   WBC  7.5  4.9   HGB  7.2*  11.7*   HCT  22.7*  35.6   PLT  132*  155        Recent Labs      11/20/17 0420 11/19/17 1715 11/19/17   1005   NA  139  141  143   K  4.7  5.1  4.3   GLU  141*  148*  104*   BUN  38*  34*  35*   CREA  1.16  0.97  0.93   CA  7.9*  8.6  9.1   INR   --    --   0.9       RECOMMENDATIONS AND DISCHARGE PLANNING     1. Pending tests/procedures/ Plan of Care or Other Needs:      2. Discharge plan for patient and Needs/Barriers:     3. Estimated Discharge Date:  Posted on Whiteboard in Patients Room: no      4. The patient's care plan was reviewed with the oncoming nurse. \"HEALS\" SAFETY CHECK      Fall Risk    Total Score: 4    Safety Measures: Safety Measures: Bed in low position, Bed/Chair-Wheels locked, Bed/Chair alarm on, Call light within reach, Fall prevention (comment), Gripper socks, Side rails X 3    A safety check occurred in the patient's room between off going nurse and oncoming nurse listed above.     The safety check included the below items  Area Items   H  High Alert Medications - Verify all high alert medication drips (heparin, PCA, etc.)   E  Equipment - Suction is set up for ALL patients (with yanker)  - Red plugs utilized for all equipment (IV pumps, etc.)  - WOWs wiped down at end of shift.  - Room stocked with oxygen, suction, and other unit-specific supplies   A  Alarms - Bed alarm is set for fall risk patients  - Ensure chair alarm is in place and activated if patient is up in a chair   L  Lines - Check IV for any infiltration  - Reed bag is empty if patient has a Reed   - Tubing and IV bags are labeled   S  Safety   - Room is clean, patient is clean, and equipment is clean. - Hallways are clear from equipment besides carts. - Fall bracelet on for fall risk patients  - Ensure room is clear and free of clutter  - Suction is set up for ALL patients (with yanker)  - Hallways are clear from equipment besides carts.    - Isolation precautions followed, supplies available outside room, sign posted     Delvis Oconnor

## 2017-11-20 NOTE — PROGRESS NOTES
[address] progress note    POD# 1  Following  IMN L femur IT fracture    S: Recovering, reports no pain    O:    Temp:  (patient refused ) (11/20/17 0804) Pulse (Heart Rate): (!) 56 (11/20/17 0646) Resp Rate: 16 (11/20/17 0646) BP: 115/52 (11/20/17 0646) O2 Sat (%): 96 % (11/20/17 0646) Weight: 44 kg (97 lb) (11/19/17 0941)     Hip dressing with bloody drainage on proximal wound  Intact EHL, FHL, GS, TA motor function  L3-S1 sensation intact  Calf soft, nontender  DP pulse palpable    Labs:  hgb- 7.2    O:  -transfuse 1u pRBC's today  -Weight bearing status: as toleraetd, no hip precautions  -antibiotics: cefazolin x 23 hours  -Diet: cardiac  -DVT prophylaxis: Lovenox x 21 days, hold today due to bleeding  -Pain control: PRN norco  -Dispo: Pending

## 2017-11-20 NOTE — PROGRESS NOTES
0501  Patient alert, no apparent distress, dressing to left upper hip saturated with blood, changed with 4x4 and metapore tape, pt tolerated well; Hgb:7. Francisco TRENT.    Kierra.Donta TRENT made aware about labs result and saturated dressing. New order received: Transfused 1 unit of blood; and d/c Lovenox per Dr. Connie Harding. 1139  Blood transfusion started (bag #1).    0551  Blood transfusing  no s/s of blood transfusion reaction. Voiced no c/o at this time. 9973  Blood transfusing  no s/s of blood transfusion reaction. Voiced no c/o at this time. 0700  Pt is alert, stable condition, care resume by AM nurse.

## 2017-11-20 NOTE — ANESTHESIA POSTPROCEDURE EVALUATION
Post-Anesthesia Evaluation and Assessment    Patient: Jacob Arnold MRN: [id number]  SSN: [id number]    YOB: 1925  Age: 80 y.o. Sex: female       Cardiovascular Function/Vital Signs  Visit Vitals    /52 (BP 1 Location: Left arm, BP Patient Position: At rest)    Pulse (!) 56    Temp 36.5 °C (97.7 °F)    Resp 16    Wt 44 kg (97 lb)    SpO2 96%    BMI 18.94 kg/m2       Patient is status post general anesthesia for Procedure(s): FEMORAL INTERTROCHANTERIC NAIL INSERTION. Nausea/Vomiting: None    Postoperative hydration reviewed and adequate. Pain:  Pain Scale 1: Numeric (0 - 10) (11/20/17 0425)  Pain Intensity 1: 0 (11/20/17 0425)   Managed    Neurological Status:   Neuro  Neurologic State: Alert;Confused (11/19/17 2156)  Orientation Level: Oriented to person;Oriented to place; Disoriented to situation (11/19/17 2156)  Cognition: Follows commands (11/19/17 2156)   At baseline    Mental Status and Level of Consciousness: Arousable    Pulmonary Status:   O2 Device: Nasal cannula (11/20/17 0609)   Adequate oxygenation and airway patent    Complications related to anesthesia: None    Post-anesthesia assessment completed.  No concerns    Signed By: Aileen Bowens CRNA     November 20, 2017

## 2017-11-20 NOTE — ROUTINE PROCESS
Patient confused won't let me redo bladder scan. Patient still hasn't voided. Patient swatting at my hands.

## 2017-11-20 NOTE — PROGRESS NOTES
OT orders received and chart reviewed. Patient is currently on bedrest at this time. Please update patients' activity level prior to OT evaluation. Thank you.     Rolando CHEN/ROHAN

## 2017-11-20 NOTE — OP NOTES
[address]    Name:  Aiden Daly  MR#:  [id number]  :  1925  Account #:  [de-identified]  Date of Adm:  2017  Date of Surgery:  2017      PREOPERATIVE DIAGNOSIS: Left displaced 4-part intratrochanteric  femur fracture. POSTOPERATIVE DIAGNOSIS: Left displaced 4-part intratrochanteric  femur fracture. PROCEDURES PERFORMED: Open reduction, internal fixation, left  displaced 4-part intertrochanteric femur fracture with intramedullary  nail. ANESTHESIA: General.    SURGEON: Eddy Kong MD.    ESTIMATED BLOOD LOSS: 20 mL. ANTIBIOTICS: Cefazolin, 2 g.    IMPLANTS:  1. Synthes 10 x 340 mm left-sided 130-degree femoral nail. 2. 90 mm lag screw. 3. 34 mm distal interlocking screw. SPECIMENS REMOVED: None. PROCEDURE AND FINDINGS: Patient identified in preop holding  area. The left leg was marked with indelible marker. Reviewed the  informed consent. She was transported to the operative theater. SCDs  were placed, antibiotics were administered. Anesthesia was induced. She was placed into traction. All bony prominences were padded. Fluoroscopic imaging demonstrated appropriate reduction of her  fracture. The left leg was prepped and draped in the usual sterile  fashion. A secondary time-out was performed. A guide pin was placed centered over the greater trochanter in both AP  and lateral fluoroscopic images. This was advanced and an opening  reamer was utilized. A guide jossei was then placed on the center of the  bone. A 340 mm nail was selected to be the appropriate length. The  femur was then reamed easily with an 11 mm reamer. A 10 x 340 mm,  130-degree left Synthes nail was then placed and sunk to the  appropriate level. This approached the anterior cortex on lateral  imaging, but did not touch or penetrate the anterior cortex distally at  the knee.  A lag screw was then placed under fluoroscopic guidance, a  guide pin was centered in the femoral neck on AP and lateral images. This was measured for a 90 mm screw. This was then over-reamed  and a 90 mm lag screw was advanced. Compression was then placed  on the fracture, creating an anatomic reduction. The end cap was then  tightened proximally on the nail. A distal interlocking screw was then placed utilizing a perfect-[place]  technique in the dynamic slot. A 34 mm screw was placed. This was  tightened. The wounds were thoroughly irrigated. All targeting devices  were removed. Layered closure was performed. Skin glue was placed  on the skin. Sterile dressings were applied. The patient was awoken  from the anesthesia and transported to the post-anesthesia care unit in  stable condition. All sponge and instrument counts correct at the end of  the procedure.         MD Yandy Call / Aspen Snyder  D:  11/19/2017   15:15  T:  11/19/2017   21:40  Job #:  [id number]

## 2017-11-21 NOTE — PROGRESS NOTES
Contacted Dr. Giancarlo Brooke to notify her that EKG has been completed and is in the system. HR 68 per EKG. Bladder scan revealed 278 ml and patient has not voided since earlier this AM. Per Dr. Giancarlo Brooke, perform straight cath on patient and continue to monitor.

## 2017-11-21 NOTE — PROGRESS NOTES
Attempted straight cath at this time but was unsuccessful due to patient movement and inadequate view. Will pass along to oncoming RN.

## 2017-11-21 NOTE — PROGRESS NOTES
conducted a Follow up consultation and Spiritual Assessment for EULALIA [facility], who is a 80 y.o.,female. The  provided the following Interventions:  Continued the relationship of care and support. Patient's granddaughter and great granddaughter were present too. Listened empathically to patient. She said that her   last . Offered [employer], prayer, and assurance of continued prayer on patient's behalf. Chart reviewed. The following outcomes were achieved:  Patient and family expressed gratitude for pastoral care visit. Assessment:  There are no further spiritual or Quaker issues which require Spiritual Care Services interventions at this time. Plan:  Chaplains will continue to follow and provide pastoral care as needed or requested. The Rev.  40  Gurdeep White [address]  [facility] [phone] / [address] [phone]

## 2017-11-21 NOTE — ROUTINE PROCESS
Bedside and Verbal shift change report given to Artemio Espinoza (oncoming nurse) by Mariano Murdock (offgoing nurse). Report included the following information SBAR, Kardex, MAR and Recent Results. Artemio Espinoza notified that straight cath attempt was unsuccessful and she stated she would accomplish after report. She was also notified of recent vital signs and conversations with MD regarding HR and the status of the current blood transfusion.      SITUATION:    Code Status: Full Code  Reason for Admission: Subtrochanteric fracture [facility])  Intertrochanteric fracture of left hip ([facility]   left hip fracture    [facility] day: 2   Problem List:       Hospital Problems  Date Reviewed: 11/19/2017          Codes Class Noted POA    * (Principal)Subtrochanteric fracture ([facility] ICD-10-CM: I89.23SG  ICD-9-CM: 820.22  11/19/2017 Yes        Intertrochanteric fracture of left hip ([facility] ICD-10-CM: R68.058C  ICD-9-CM: 820.21  11/19/2017 Unknown        Mitral regurgitation and aortic stenosis ICD-10-CM: I08.0  ICD-9-CM: 396.2  8/1/2017 Yes              BACKGROUND:    Past Medical History:   Past Medical History:   Diagnosis Date    Ill-defined condition     synope         Patient taking anticoagulants no     ASSESSMENT:    Changes in Assessment Throughout Shift: No     Patient has Central Line: no Reasons if yes:    Patient has Reed Cath: no Reasons if yes:       Last Vitals:     Vitals:    11/21/17 0605 11/21/17 0628 11/21/17 0700 11/21/17 0720   BP: 109/47 123/68 109/59 105/40   Pulse: 60 (!) 42 65 (!) 51   Resp: 20 21 20 19   Temp: 97.4 °F (36.3 °C) 97.3 °F (36.3 °C) 97.5 °F (36.4 °C) 97 °F (36.1 °C)   SpO2: 99% 99% 98% 91%   Weight:            IV and DRAINS (will only show if present)   [REMOVED] Peripheral IV 11/19/17 Right Wrist-Site Assessment: Clean, dry, & intact  [REMOVED] Peripheral IV 11/20/17 Right Forearm-Site Assessment: Clean, dry, & intact  Peripheral IV 11/21/17 Left Forearm-Site Assessment: Clean, dry, & intact  [REMOVED] Peripheral IV 11/19/17 Right Forearm-Site Assessment: Clean, dry, & intact     WOUND (if present)   Wound Type:  none, incision   Dressing present Dressing Present : No   Wound Concerns/Notes:  none     PAIN    Pain Assessment    Pain Intensity 1: 0 (11/21/17 0331)              Patient Stated Pain Goal: 0  o Interventions for Pain:  none  o Intervention effective: yes  o Time of last intervention: n/a   o Reassessment Completed: yes      Last 3 Weights:  Last 3 Recorded Weights in this Encounter    11/19/17 0941   Weight: 44 kg (97 lb)     Weight change:      INTAKE/OUPUT    Current Shift:      Last three shifts: 11/19 1901 - 11/21 0700  In: 2935.1 [P.O.:680; I.V.:1483.8]  Out: 400 [Urine:400]     LAB RESULTS     Recent Labs      11/21/17   0050  11/20/17   0420  11/19/17   1005   WBC   --   7.5  4.9   HGB  6.8*  7.2*  11.7*   HCT  20.3*  22.7*  35.6   PLT   --   132*  155        Recent Labs      11/20/17   0420  11/19/17   1715  11/19/17   1005   NA  139  141  143   K  4.7  5.1  4.3   GLU  141*  148*  104*   BUN  38*  34*  35*   CREA  1.16  0.97  0.93   CA  7.9*  8.6  9.1   INR   --    --   0.9       RECOMMENDATIONS AND DISCHARGE PLANNING     1. Pending tests/procedures/ Plan of Care or Other Needs:      2. Discharge plan for patient and Needs/Barriers:     3. Estimated Discharge Date:  Posted on Whiteboard in Patients Room: no      4. The patient's care plan was reviewed with the oncoming nurse. \"HEALS\" SAFETY CHECK      Fall Risk    Total Score: 5    Safety Measures: Safety Measures: Bed/Chair-Wheels locked, Bed in low position, Caregiver at bedside, Call light within reach, Gripper socks, Side rails X 3    A safety check occurred in the patient's room between off going nurse and oncoming nurse listed above.     The safety check included the below items  Area Items   H  High Alert Medications - Verify all high alert medication drips (heparin, PCA, etc.)   E  Equipment - Suction is set up for ALL patients (with yanker)  - Red plugs utilized for all equipment (IV pumps, etc.)  - WOWs wiped down at end of shift.  - Room stocked with oxygen, suction, and other unit-specific supplies   A  Alarms - Bed alarm is set for fall risk patients  - Ensure chair alarm is in place and activated if patient is up in a chair   L  Lines - Check IV for any infiltration  - Reed bag is empty if patient has a Reed   - Tubing and IV bags are labeled   S  Safety   - Room is clean, patient is clean, and equipment is clean. - Hallways are clear from equipment besides carts. - Fall bracelet on for fall risk patients  - Ensure room is clear and free of clutter  - Suction is set up for ALL patients (with erickson)  - Hallways are clear from equipment besides carts.    - Isolation precautions followed, supplies available outside room, sign posted     Lauren Diaz

## 2017-11-21 NOTE — PROGRESS NOTES
Contacted Dr. Char Tomlinson to notify her of patient's low heart rate. HR last at 42. Patient is at baseline mentally - still confused but following commands. She denies any pain. She has not received anything medication wise to slow her HR. When asked if she feels okay, \"I feel fine. \" Per Dr. Char Tomlinson, slow rate of transfusion, get an EKG and continue to monitor.

## 2017-11-21 NOTE — PROGRESS NOTES
Contacted Dr. Sho Parks to notify her that patient is very confused and pulling at her lines. She has pulled out her second IV and removed her surgical dressing. Nursing staff has attempted multiple times to re-orient Ms. Lopez with no evidence that she understands. MD ordered for bilateral soft wrist restraints for the patient which were applied without any difficulty from the patient. MD also notified that the patient's surgical site dressing is saturated and asked if H/H could be drawn now instead of 0400. MD verbalized that we could have lab drawn now. Will attempt to notify daughter of restraint order. It was also noted that patient had incontinent episode of urine as linens were changed. Patient bladder scanned again as it impossible to tell the amount of urine. Patient now has 85 ml of urine in her bladder compared to 210 earlier. Will continue to monitor urine output closely.

## 2017-11-21 NOTE — PROGRESS NOTES
NUTRITION    Nutrition Consult: Other     RECOMMENDATIONS / PLAN:     - Add nutritional supplement: Ensure Enlive BID  - Liberalize diet to regular. - Continue RD inpatient monitoring and evaluation. NUTRITION INTERVENTIONS & DIAGNOSIS:     [x] Meals/Snacks: modified diet  [x] Medical food supplementation: initiate     Nutrition Diagnosis: Inadequate oral intake related to decreased appetite and likely confusion as evidenced by pt consuming <25% of meal.    ASSESSMENT:     S/p ORIF after fall at [facility]. Poor intake this morning, consuming between 0-25% with nursing assistance. Question current weight, pt unable to provide. Pt weighed 110 lbs on the bed scale, but believe that is inaccurate.     Average po intake adequate to meet patients estimated nutritional needs:   [] Yes     [x] No   [] Unable to determine at this time    Diet: DIET CARDIAC Regular      Food Allergies: NKFA  Current Appetite:   [] Good     [] Fair     [x] Poor     [] Other  Appetite/meal intake prior to admission:   [] Good     [x] Fair     [] Poor     [] Other:  Feeding Limitations:  [] Swallowing difficulty    [] Chewing difficulty    [x] Other: assisted with meals  Current Meal Intake: Patient Vitals for the past 100 hrs:   % Diet Eaten   11/20/17 0907 0 %       BM: 11/19    Skin Integrity: surgical incisions to left hip, knee and thigh  Edema: none  Pertinent Medications: Reviewed    Recent Labs      11/20/17   0420  11/19/17   1715  11/19/17   1005   NA  139  141  143   K  4.7  5.1  4.3   CL  107  109*  107   CO2  23  24  28   GLU  141*  148*  104*   BUN  38*  34*  35*   CREA  1.16  0.97  0.93   CA  7.9*  8.6  9.1   ALB   --    --   3.9   SGOT   --    --   22   ALT   --    --   23       Intake/Output Summary (Last 24 hours) at 11/21/17 1219  Last data filed at 11/21/17 0530   Gross per 24 hour   Intake          1162.55 ml   Output                0 ml   Net          1162.55 ml       Anthropometrics:  Ht Readings from Last 1 Encounters: 11/21/17 5' 1\" (1.549 m)     Last 3 Recorded Weights in this Encounter    11/19/17 0941   Weight: 44 kg (97 lb)     Body mass index is 18.94 kg/(m^2). Weight History: Pt reports weight loss, but is confused and unable to provide specifics    Weight Metrics 11/19/2017 8/8/2017 8/1/2017 6/28/2017 12/10/2015 6/29/2015 6/5/2015   Weight 97 lb 90 lb 90 lb 99 lb 98 lb 94 lb 99 lb   BMI 18.94 kg/m2 17.58 kg/m2 17.58 kg/m2 19.33 kg/m2 18.53 kg/m2 17.77 kg/m2 18.72 kg/m2        Admitting Diagnosis: Subtrochanteric fracture (HCC)  Intertrochanteric fracture of left hip (HCC)  left hip fracture  Pertinent PMHx: no pertinent hx    Education Needs:        [x] None identified  [] Identified - Not appropriate at this time  []  Identified and addressed - refer to education log  Learning Limitations:   [] None identified  [x] Identified: confusion  Cultural, Oriental orthodox & ethnic food preferences:  [x] None identified    [] Identified and addressed     ESTIMATED NUTRITION NEEDS:     Calories: 993-1076 kcal (MSJx1.2-1.3) based on  [x] Actual BW 48 kg     [] IBW   Protein: 38-48 gm (0.8-1 gm/kg) based on  [x] Actual BW      [] IBW   Fluid: 1 mL/kcal     MONITORING & EVALUATION:     Nutrition Goal(s):   1. Po intake of meals will meet >75% of patient estimated nutritional needs within the next 7 days.   Outcome:  [] Met/Ongoing    []  Not Met    [x] New/Initial Goal     Monitoring:   [x] Diet tolerance   [x] Meal intake   [x] Supplement intake   [] GI symptoms/ability to tolerate po diet   [] Respiratory status   [] Plan of care      Previous Recommendations (for follow-up assessments only):     []   Implemented       []   Not Implemented (RD to address)      [] No Longer Appropriate     [] No Recommendation Made     Discharge Planning: regular diet  [x] Participated in care planning, discharge planning, & interdisciplinary rounds as appropriate      Marvin Castellanos, [address]    Pager: [phone]

## 2017-11-21 NOTE — PROGRESS NOTES
Upon entering patients room she is sitting up in her chair awake and alert. No acute distress noted. Patient is confused and pulling at O2 tubing. She is able to tell me her name but unable to tell me the correct time, date, , her current location, or who the president is. Upon assessment her gown has blood stains on it from previous shift. RN states that patient dressing continues to saturate and that Dr. Jimmy Palacios is aware. Patient states it has been there all day. No fluids are running at this time. Per RN none have been running today. Patient has pulled out her IV. Catheter intact and still taped to arm. Bladder scan perform as previous nurse has stated that patient has not voided since powers removed at 0430 this AM. Patient has 210 ml in her bladder. Will notify provider. Vital signs are currently stable. See vitals flow sheet.

## 2017-11-21 NOTE — PROGRESS NOTES
Unable to see pt for skilled OT at this time due to: (08:28) pt is currently receiving blood transfusion due to low H&H, attempt x2 (12:45) pt is eating lunch. Will continue to follow. Thank you.     GRETCHEN Bethea/ROHAN

## 2017-11-21 NOTE — PROGRESS NOTES
Contacted Dr. Yimi Kumar to notify him of patient's current status and H/H. He was notified that patient's dressing was saturated and continuing to ooze blood. Dr. Yimi Kumar ordered to place a hip spica dressing in which he explained in detail and to transfuse 2 units of PRBC. No further orders at this time.

## 2017-11-21 NOTE — PROGRESS NOTES
[facility] progress note    POD#2  Following  L hip IT fracture repair    S: continued bleeding from proximal wound overnight, improved with hip spica ACE wrap  O:    Temp: 97.7 °F (36.5 °C) (11/21/17 1014) Pulse (Heart Rate): (!) 56 (11/21/17 1014) Resp Rate: 16 (11/21/17 1014) BP: 115/53 (11/21/17 1014) O2 Sat (%): 97 % (11/21/17 1602) Weight: 44 kg (97 lb) (11/19/17 0941)     Hip dressings C/D/I, coagulating without active drainage  Intact EHL, FHL, GS, TA motor function  L3-S1 sensation intact  Calf soft, nontender  DP pulse palpable    Labs:  hgb- 9.9 after pRBC's    O:  -Weight bearing status: as tolerated  -antibiotics: cefazolin completed  -Diet: cardiac  -DVT prophylaxis: lovenox  -Pain control: PRN tylenol, norco  -Dispo: pending

## 2017-11-21 NOTE — PROGRESS NOTES
Daughter Misty Fowler contacted and notified that patient has been placed on wrist restraints to to pulling out IVs and surgical dressing. Daughter verbalizes understanding and thanks for notifying her. She denies any questions at this time. Will keep her updated on any changes.

## 2017-11-21 NOTE — PROGRESS NOTES
[facility]    Patient: Crispin Luevano Age: 80 y.o. : 4/3/1925 MR#: [id number] SSN: [id number]  Date: 2017     Subjective:     Events o/n noted. Pt completed xfusion of PRBC. Case d/w nurse. Presently has no c/o, denies pain, F/C, N/V, CP or SOB. Assessment/Plan:   1. L hip fx s/p repair - ORIF on . Continue post-op care. Lovenox held for bleeding. No c/o. Rec'd another unit PRBC. 2. Acute post-op blood loss anemia - hemodynamically stable. Off Lovenox. Xfused another unit PRBC, rechecking H/H.  3. AStenosis - by hx. No acute issues. Last echo with EF 60%, no obvious wall motion abnml, AValve mean gradient 36mmHg, estamated valve area 0.75cm^2. Seen by cardiology 2017, no indication for valve replacement. Outpt f/u.  4. Urinary retention - per nurse, pt is voiding. Following clinically. 5. Acute metabolic encephalopathy - transient, supportive care. Seems to be back to baseline, following clinically. 6. Fall precautions. Attempted to contact pt's son, no answer, left msg. Additional Notes:      Case discussed with:  [x]Patient  []Family  [x]Nursing  []Case Management  DVT Prophylaxis:  []Lovenox  []Hep SQ  []SCDs  []Coumadin   []On Heparin gtt    Objective:   VS:   Visit Vitals    /53    Pulse (!) 56    Temp 97.7 °F (36.5 °C)    Resp 16    Wt 44 kg (97 lb)    SpO2 99%    BMI 18.94 kg/m2      Tmax/24hrs: Temp (24hrs), Av.8 °F (36.6 °C), Min:97 °F (36.1 °C), Max:99.3 °F (37.4 °C)      Intake/Output Summary (Last 24 hours) at 17 1139  Last data filed at 17 0530   Gross per 24 hour   Intake          1162.55 ml   Output                0 ml   Net          1162.55 ml       General:  Awake, alert, pleasant, conversant. NAD. Cardiovascular:  RRR. Pulmonary:  CTA B ant.   GI:  Soft, NT/ND, NABS. Extremities:  No CT or edema. L hip op site dressed, c/d/i.   Additional:      Labs:    Recent Results (from the past 24 hour(s))   HGB & HCT    Collection Time: 11/21/17 12:50 AM   Result Value Ref Range    HGB 6.8 (L) 12.0 - 16.0 g/dL    HCT 20.3 (L) 35.0 - 45.0 %   EKG, 12 LEAD, INITIAL    Collection Time: 11/21/17  6:40 AM   Result Value Ref Range    Ventricular Rate 68 BPM    Atrial Rate 100 BPM    P-R Interval 208 ms    QRS Duration 94 ms    Q-T Interval 420 ms    QTC Calculation (Bezet) 446 ms    Calculated P Axis 67 degrees    Calculated R Axis -50 degrees    Calculated T Axis 56 degrees    Diagnosis       Sinus rhythm with 2nd degree AV block (Mobitz II)  Incomplete right bundle branch block  Left anterior fascicular block  T wave abnormality, consider anterior ischemia  Abnormal ECG    Confirmed by Rosa Dixon MD, [facility] ([id number]) on 11/21/2017 8:22:36 AM         Signed By: Cheikh Meadows MD     November 21, 2017 12:47 PM

## 2017-11-21 NOTE — PROGRESS NOTES
Problem: Mobility Impaired (Adult and Pediatric)  Goal: *Acute Goals and Plan of Care (Insert Text)  Physical Therapy Goals  Initiated 11/20/2017 and to be accomplished within 7 day(s)  1. Patient will move from supine to sit and sit to supine  in bed with minimal assistance/contact guard assist.     2.  Patient will transfer from bed to chair and chair to bed with minimal assistance/contact guard assist using the least restrictive device. 3.  Patient will perform sit to stand with minimal assistance/contact guard assist.  4.  Patient will ambulate with minimal assistance/contact guard assist for 50 feet with the least restrictive device. 36 - Pt speaking with  at this time. Will f/u later in day. 5033 - Pt eating dinner will f/u per pt's schedule.

## 2017-11-21 NOTE — PROGRESS NOTES
Contacted Dr. Stuart Gomez and updated him on patient's situation. No powers at this time. Due to patient not having fluids connected we will give a 500 ml NS bolus and resume fluids at the ordered rate of 75 ml per hour. No further orders at this time. Will continue to monitor closely.

## 2017-11-22 NOTE — PROGRESS NOTES
Problem: Mobility Impaired (Adult and Pediatric)  Goal: *Acute Goals and Plan of Care (Insert Text)  Physical Therapy Goals  Initiated 11/20/2017 and to be accomplished within 7 day(s)  1. Patient will move from supine to sit and sit to supine  in bed with minimal assistance/contact guard assist.     2.  Patient will transfer from bed to chair and chair to bed with minimal assistance/contact guard assist using the least restrictive device. 3.  Patient will perform sit to stand with minimal assistance/contact guard assist.  4.  Patient will ambulate with minimal assistance/contact guard assist for 50 feet with the least restrictive device. Outcome: Progressing Towards Goal  physical Therapy TREATMENT    Patient: Katie Barlow (80 y.o. female)  Date: 11/22/2017  Diagnosis: Subtrochanteric fracture (HCC)  Intertrochanteric fracture of left hip (HCC)  left hip fracture Subtrochanteric fracture (HCC)  Procedure(s) (LRB):  FEMORAL INTERTROCHANTERIC NAIL INSERTION (Left) 3 Days Post-Op  Precautions: Fall, WBAT (LLE)   Chart, physical therapy assessment, plan of care and goals were reviewed. ASSESSMENT:  Pt found supine in bed willing to work with PT. Pt is currently oriented to delf and time, but thinks she is in [place]. Pt sat at EOB requiring more A than previous tx. Once sitting pt stood an began to have black liquid BM. Pt sat down and performed therex while waiting on assistance for lobo care. Pt also stood 4 more times, as she continuously attempts to stand on her own. Lobo care performed in standing and pad/ sheets changed. Stool has soaked into pt's ace wrap. PureWick thrown in trash. Pt took 3 side steps to [facility] and then returned to supine. Pt scooted to [facility] with total A x 2. Pt progressing slowly, denying pain throughout tx. Education: therex, transfers.   Progression toward goals:  []      Improving appropriately and progressing toward goals  [x]      Improving slowly and progressing toward goals  [] Not making progress toward goals and plan of care will be adjusted     PLAN:  Patient continues to benefit from skilled intervention to address the above impairments. Continue treatment per established plan of care. Discharge Recommendations:  SNF  Further Equipment Recommendations for Discharge:  rolling walker     SUBJECTIVE:   Patient stated I'm in [place].     OBJECTIVE DATA SUMMARY:   Critical Behavior:  Neurologic State: Confused  Orientation Level: Oriented to person, Oriented to time  Cognition: Follows commands, Impaired decision making, Decreased command following  Functional Mobility Training:  Bed Mobility:  Supine to Sit: Moderate assistance;Maximum assistance  Sit to Supine: Maximum assistance  Scooting: Moderate assistance; Additional time  Transfers:  Sit to Stand: Minimum assistance  Stand to Sit: Minimum assistance  Balance:  Sitting: Impaired  Sitting - Static: Fair (occasional) (+)  Sitting - Dynamic: Fair (occasional)  Standing: Impaired; With support  Standing - Static: Good  Standing - Dynamic : Fair  Ambulation/Gait Training:  Distance (ft): 3 Feet (ft)  Assistive Device: Walker, rolling  Ambulation - Level of Assistance: Minimal assistance  Gait Abnormalities: Antalgic;Decreased step clearance    Therapeutic Exercises:   LAQ, heel and toe raises x 10 bilaterally. Pain:  Pre tx pain: 0  Post tx pain: 0  Activity Tolerance:   Fair  Please refer to the flowsheet for vital signs taken during this treatment. After treatment:   [] Patient left in no apparent distress sitting up in chair  [x] Patient left in no apparent distress in bed  [x] Call bell left within reach  [] Nursing notified  [x] Caregiver present  [] Bed alarm activated      Alejo Constantino PTA   Time Calculation: 25 mins    Mobility  Current  CM= 80-99%. The severity rating is based on the Level of Assistance required for Functional Mobility and ADLs. Mobility   Goal  CJ= 20-39%.   The severity rating is based on the Level of Assistance required for Functional Mobility and ADLs.

## 2017-11-22 NOTE — PROGRESS NOTES
Nutrition:     Tolerating diet. Feeding herself breakfast at time of visit. Intake is fair. Per nursing, pt at 25% of breakfast and lunch yesterday, but 85% of dinner and consumed most of th Ensure supplement. If pt okay to get out of bed today, nursing to attempt to get weight on standing scale. Pt dislikes milk, will add food preference. Nutrition goal not met, but pt is progressing towards the goal.     Recommendation:  - Add food preference. - Continue diet and supplements. - Obtain weight on standing scale when able/medically appropriate.   - Continue RD inpatient monitoring and evaluation.     Gisela Hudson, FRANCIE, CNSC

## 2017-11-22 NOTE — PROGRESS NOTES
Bedside and Verbal shift change report given to JM Claire  (oncoming nurse) by Carolyn Benitez LPN (offgoing nurse). Report included the following information SBAR, Kardex, Intake/Output and MAR.

## 2017-11-22 NOTE — PROGRESS NOTES
[facility]ist [facility]  Progress Note    Patient: Miracle Jordan Age: 80 y.o. : 4/3/1925 MR#: [id number] SSN: [id number]  Date: 2017     Subjective:     No F/C, N/V, CP, SOB, pain c/o. Denies hip pain. Appears well, communicative. Assessment/Plan:   1. L hip fx s/p repair - ORIF on . Continue post-op care. Lovenox held for bleeding. Total of 3u PRBC xfused, H/H appears to have responded well. Recheck levels in AM. Dispo planning. 2. Acute post-op blood loss anemia - hemodynamically stable. Off Lovenox. As per #1.   3. AStenosis - by hx. No acute issues. Last echo with EF 60%, no obvious wall motion abnml, AValve mean gradient 36mmHg, estimated valve area 0.75cm^2. Seen by cardiology 2017, no indication for valve replacement. Outpt f/u.  4. Urinary retention - resolved. 5. Acute metabolic encephalopathy - transient, supportive care. Appears to be at baseline. 6. Fall precautions, PT/OT/Dispo. Additional Notes:      Case discussed with:  [x]Patient  []Family  []Nursing  []Case Management  DVT Prophylaxis:  []Lovenox  []Hep SQ  []SCDs  []Coumadin   []On Heparin gtt    Objective:   VS:   Visit Vitals    /69 (BP 1 Location: Left leg, BP Patient Position: Supine)    Pulse 91    Temp 97 °F (36.1 °C)    Resp 16    Ht 5' 1\" (1.549 m)    Wt 44 kg (97 lb)    SpO2 96%    BMI 18.94 kg/m2      Tmax/24hrs: Temp (24hrs), Av.5 °F (36.4 °C), Min:97 °F (36.1 °C), Max:98.3 °F (36.8 °C)      Intake/Output Summary (Last 24 hours) at 17 1831  Last data filed at 17 1311   Gross per 24 hour   Intake              360 ml   Output              425 ml   Net              -65 ml       General:  Awake, alert, NAD. Cardiovascular:  RRR. Pulmonary:  CTA B ant.   GI:  Soft, NT/ND, NABS. Extremities:  No CT or edema. L hip op site dressed, c/d/i. Additional:      Labs:    No results found for this or any previous visit (from the past 24 hour(s)).     Signed By: Nimisha Scherer MD     November 22, 2017 12:47 PM

## 2017-11-22 NOTE — PROGRESS NOTES
Problem: Self Care Deficits Care Plan (Adult)  Goal: *Acute Goals and Plan of Care (Insert Text)  Occupational Therapy Goals  Initiated 11/20/2017 within 7 day(s). 1.  Patient will perform grooming with supervision/set-up   2. Patient will perform upper body dressing with supervision/set-up. 3.  Patient will perform lower body dressing with moderate assistance . 4. Patient will perform BSC transfers with minimal assistance/contact guard assist.  5.  Patient will perform all aspects of toileting with minimal assistance/contact guard assist.  6.  Patient will participate in upper extremity therapeutic exercise/activities with supervision/set-up in preparation for self care tasks   Outcome: Progressing Towards Goal  Occupational Therapy TREATMENT    Patient: Philip Mclaughlin (80 y.o. female)  Date: 11/22/2017  Diagnosis: Subtrochanteric fracture (HCC)  Intertrochanteric fracture of left hip (HCC)  left hip fracture Subtrochanteric fracture (HCC)  Procedure(s) (LRB):  FEMORAL INTERTROCHANTERIC NAIL INSERTION (Left) 3 Days Post-Op  Precautions: Fall, WBAT (LLE)  Chart, occupational therapy assessment, plan of care, and goals were reviewed. ASSESSMENT:  Pt is very pleasant and cooperative this session, agreeable to maneuver to EOB in preparation for ADLs and functional txfrs. Pt required Min A and additional time w/VCs for sequencing during maneuvering to EOB. Pt is intermittently confused, required step-by-step directions during tasks. Pt demonstrates decreased sitting balance this session. Pt reports no pain, however is keeping LLE in guarded position off the floor upon sit EOB. Pt required CGA/Min A for seated ADL tasks at EOB, tolerated sitting for ~10 min prior to fatigue and requesting to return to sup. Following which pt required Mod A to return to sup and to scoot to EOB.  Pt requesting to have her nails cleaned, required set up and CGA to initiate the task, however was able to continue independently. Progression toward goals:  []          Improving appropriately and progressing toward goals  [x]          Improving slowly and progressing toward goals  []          Not making progress toward goals and plan of care will be adjusted     PLAN:  Patient continues to benefit from skilled intervention to address the above impairments. Continue treatment per established plan of care. Discharge Recommendations:  [facility]  Further Equipment Recommendations for Discharge:  N/A      G-CODES:     Self Care  Current  CL= 60-79%   Goal  CJ= 20-39%. The severity rating is based on the Level of Assistance required for Functional Mobility and ADLs. SUBJECTIVE:   Patient stated It feels good to sit up.     OBJECTIVE DATA SUMMARY:   Cognitive/Behavioral Status:  Neurologic State: Alert  Orientation Level: Oriented to person, Oriented to place  Cognition: Follows commands, Impaired decision making       Functional Mobility and Transfers for ADLs:   Bed Mobility:     Supine to Sit: Minimum assistance  Sit to Supine: Moderate assistance  Scooting: Moderate assistance; Additional time   Balance:  Sitting: Impaired  Sitting - Static: Fair (occasional) (minus)  Sitting - Dynamic: Poor (constant support)    ADL Intervention:       Feeding  Feeding Assistance: Contact guard assistance (EOB)    Grooming  Grooming Assistance: Contact guard assistance;Supervision/set up (seated EOB and supine)  Washing Face: Contact guard assistance  Washing Hands: Contact guard assistance;Supervision/set-up (CGA at EOB)    Upper Body [address]: Minimum  assistance (seated EOB)     Pain:  Pt reports 0/10 pain or discomfort prior to treatment.    Pt reports 0/10 pain or discomfort post treatment. Activity Tolerance:    Fair    Please refer to the flowsheet for vital signs taken during this treatment.   After treatment:   []  Patient left in no apparent distress sitting up in chair  [x] Patient left in no apparent distress in bed   [x]  Call bell left within reach  [x]  Nursing notified  []  Caregiver present  [x]  Bed alarm activated    YUKI Winslow  Time Calculation: 23 mins

## 2017-11-22 NOTE — PROGRESS NOTES
[facility] progress note     POD# 3  Following  L hip IT fracture repair     S: bleeding improved, worked with PT and OT. Dispo to SNF.     O:     Temp: 97 °F (36.1 °C) (11/22/17 1535) Pulse (Heart Rate): 91 (11/22/17 1535) Resp Rate: 16 (11/22/17 1535) BP: 135/69 (11/22/17 1535) O2 Sat (%): 96 % (11/22/17 1535) Weight: 44 kg (97 lb) (11/19/17 0941)        Hip dressings C/D/I, coagulating without active drainage  Intact EHL, FHL, GS, TA motor function  L3-S1 sensation intact  Calf soft, nontender  DP pulse palpable     Labs:  hgb- pending     O:  -Weight bearing status: as tolerated  -antibiotics: cefazolin completed  -Diet: cardiac  -DVT prophylaxis: lovenox  -Pain control: PRN tylenol, norco  -Dispo: SNF when clear

## 2017-11-23 NOTE — ROUTINE PROCESS
Pt is in stable condition. Alert and oriented x 4 with periods of confusion. VSS. Denies any acute distress, chest pain or SOB. Dressing to left hip with ace bandage is CDI. Pt has + pulses, + sensation. Skin is warm to touch. Pt denies tingling and numbness. Cap refill <3 sec. Verbalizes No further needs. Call bell is within reach, bed in low position. Will continue to monitor.

## 2017-11-23 NOTE — PROGRESS NOTES
Bedside shift change report given to Gurdeep Martinez (oncoming nurse) by Rosi Russ (offgoing nurse). Report included the following information SBAR, Kardex, ED Summary, OR Summary, Procedure Summary, Intake/Output, MAR and Recent Results.

## 2017-11-23 NOTE — ROUTINE PROCESS
Bedside and Verbal shift change report given to Dafne (oncoming nurse) by Suze Gilliam (offgoing nurse). Report included the following information SBAR, Kardex, MAR and Recent Results.     SITUATION:    Code Status: Full Code   Reason for Admission: Subtrochanteric fracture ([address]   Intertrochanteric fracture of left hip ([address]   left hip fracture    [facility] day: 4   Problem List:       Hospital Problems  Date Reviewed: 11/19/2017          Codes Class Noted POA    * (Principal)Subtrochanteric fracture ([facility] ICD-10-CM: Q00.42GB  ICD-9-CM: 820.22  11/19/2017 Yes        Intertrochanteric fracture of left hip ([facility] [address] ICD-10-CM: Q58.107F  ICD-9-CM: 820.21  11/19/2017 Unknown        Mitral regurgitation and aortic stenosis ICD-10-CM: I08.0  ICD-9-CM: 396.2  8/1/2017 Yes              BACKGROUND:    Past Medical History:   Past Medical History:   Diagnosis Date    Ill-defined condition     synope         Patient taking anticoagulants no     ASSESSMENT:    Changes in Assessment Throughout Shift: no     Patient has Central Line: no    Patient has Reed Cath: no       Last Vitals:     Vitals:    11/23/17 0417 11/23/17 0757 11/23/17 1140 11/23/17 1551   BP: 130/65 106/65 115/66 117/66   Pulse: 71 (!) 49 85 88   Resp: 18 18 20 16   Temp: 97.6 °F (36.4 °C) 98.6 °F (37 °C) 97.4 °F (36.3 °C) 97.5 °F (36.4 °C)   SpO2: 99% 99% 99% 90%   Weight:       Height:            IV and DRAINS (will only show if present)   [REMOVED] Peripheral IV 11/19/17 Right Wrist-Site Assessment: Clean, dry, & intact  [REMOVED] Peripheral IV 11/20/17 Right Forearm-Site Assessment: Clean, dry, & intact  Peripheral IV 11/21/17 Left Forearm-Site Assessment: Clean, dry, & intact  [REMOVED] Peripheral IV 11/19/17 Right Forearm-Site Assessment: Clean, dry, & intact     WOUND (if present)   Wound Type:  Left hip and leg with ace wrap   Dressing present Dressing Present : Intact, not due to be changed   Wound Concerns/Notes:  none     PAIN    Pain Assessment    Pain Intensity 1: 4 (11/23/17 1011)    Pain Location 1: Hip    Pain Intervention(s) 1: Medication (see MAR)    Patient Stated Pain Goal: 0  o Interventions for Pain:  none, tylenol and norco  o Intervention effective: yes  o Time of last intervention: see mar   o Reassessment Completed: no      Last 3 Weights:  Last 3 Recorded Weights in this Encounter    11/19/17 0941   Weight: 44 kg (97 lb)     Weight change:      INTAKE/OUPUT    Current Shift:      Last three shifts: 11/21 1901 - 11/23 0700  In: 360 [P.O.:360]  Out: 625 [Urine:575]     LAB RESULTS     Recent Labs      11/23/17   0236  11/21/17   1338  11/21/17   0050   WBC  7.7   --    --    HGB  7.9*  9.9*  6.8*   HCT  23.8*  29.4*  20.3*   PLT  101*   --    --         Recent Labs      11/23/17   0236  11/21/17   1330   NA  135*   --    K  4.2   --    GLU  95   --    BUN  45*   --    CREA  0.68   --    CA  7.8*   --    MG   --   1.9       RECOMMENDATIONS AND DISCHARGE PLANNING     1. Pending tests/procedures/ Plan of Care or Other Needs: no     2. Discharge plan for patient and Needs/Barriers: no    3. Estimated Discharge Date: n/a Posted on Whiteboard in Patients Room: no      4. The patient's care plan was reviewed with the oncoming nurse. \"HEALS\" SAFETY CHECK      Fall Risk    Total Score: 3    Safety Measures: Safety Measures: Bed/Chair-Wheels locked, Bed in low position, Call light within reach, Gripper socks    A safety check occurred in the patient's room between off going nurse and oncoming nurse listed above.     The safety check included the below items  Area Items   H  High Alert Medications - Verify all high alert medication drips (heparin, PCA, etc.)   E  Equipment - Suction is set up for ALL patients (with yanker)  - Red plugs utilized for all equipment (IV pumps, etc.)  - WOWs wiped down at end of shift.  - Room stocked with oxygen, suction, and other unit-specific supplies   A  Alarms - Bed alarm is set for fall risk patients  - Ensure chair alarm is in place and activated if patient is up in a chair   L  Lines - Check IV for any infiltration  - Reed bag is empty if patient has a Reed   - Tubing and IV bags are labeled   S  Safety   - Room is clean, patient is clean, and equipment is clean. - Hallways are clear from equipment besides carts. - Fall bracelet on for fall risk patients  - Ensure room is clear and free of clutter  - Suction is set up for ALL patients (with domenicoker)  - Hallways are clear from equipment besides carts.    - Isolation precautions followed, supplies available outside room, sign posted     [facility]

## 2017-11-23 NOTE — ROUTINE PROCESS
Pt is stable, no distress noted, denies pain. Bell alarm activated, hourly round, + pedal pulse, dressing CDI. Will continue to monitor.

## 2017-11-23 NOTE — ROUTINE PROCESS
Pt refused heparin injection, she started grabbing at my hands and saying no she dont need it. + pedal pulses , denies numbness and tingling, will continue to monitor.

## 2017-11-23 NOTE — PROGRESS NOTES
[facility] Hospitalist Group  Progress Note    Patient: Chito Oakes Age: 80 y.o. : 4/3/1925 MR#: [id number] SSN: [id number]  Date: 2017     Subjective:     Sleeping, rouses to voice. No F/C, N/V, CP, SOB. No pain c/o. Assessment/Plan:   1. L hip fx s/p repair - ORIF on . Continue post-op care. Lovenox held for bleeding. Total of 3u PRBC xfused. H/H down today, will continue to follow. Appears hemodynamically stable. 2. Acute post-op blood loss anemia - hemodynamically stable. Off Lovenox. As per #1.   3. AStenosis - by hx. No acute issues. Last echo with EF 60%, no obvious wall motion abnml, AValve mean gradient 36mmHg, estimated valve area 0.75cm^2. Seen by cardiology 2017, no indication for valve replacement. Outpt f/u.  4. Urinary retention - resolved. 5. Acute metabolic encephalopathy - transient, supportive care. At baseline. 6. Fall precautions, PT/OT/Dispo. 7. Heparin for DVT prophylaxis if ok with ortho, hold for any recurrent bleeding/oozing from op site. Additional Notes:      Case discussed with:  [x]Patient  []Family  []Nursing  []Case Management  DVT Prophylaxis:  []Lovenox  [x]Hep SQ  []SCDs  []Coumadin   []On Heparin gtt    Objective:   VS:   Visit Vitals    /66 (BP 1 Location: Left arm, BP Patient Position: At rest)    Pulse 88    Temp 97.5 °F (36.4 °C)    Resp 16    Ht 5' 1\" (1.549 m)    Wt 44 kg (97 lb)    SpO2 90%    BMI 18.94 kg/m2      Tmax/24hrs: Temp (24hrs), Av.8 °F (36.6 °C), Min:97.4 °F (36.3 °C), Max:98.6 °F (37 °C)      Intake/Output Summary (Last 24 hours) at 17 1606  Last data filed at 17 4081   Gross per 24 hour   Intake                0 ml   Output              200 ml   Net             -200 ml       General:  Sleeping, rouses to verbal stim. Alert. NAD. Cardiovascular:  RRR. Pulmonary:  CTA B ant.   GI:  Soft, NT/ND, NABS. Extremities:  No CT or edema. L hip op site dressed, c/d/i.   Additional: Labs:    Recent Results (from the past 24 hour(s))   CBC WITH AUTOMATED DIFF    Collection Time: 11/23/17  2:36 AM   Result Value Ref Range    WBC 7.7 4.6 - 13.2 K/uL    RBC 2.56 (L) 4.20 - 5.30 M/uL    HGB 7.9 (L) 12.0 - 16.0 g/dL    HCT 23.8 (L) 35.0 - 45.0 %    MCV 93.0 74.0 - 97.0 FL    MCH 30.9 24.0 - 34.0 PG    MCHC 33.2 31.0 - 37.0 g/dL    RDW 15.0 (H) 11.6 - 14.5 %    PLATELET 067 (L) 590 - 420 K/uL    MPV 10.6 9.2 - 11.8 FL    NEUTROPHILS 73 40 - 73 %    LYMPHOCYTES 13 (L) 21 - 52 %    MONOCYTES 13 (H) 3 - 10 %    EOSINOPHILS 1 0 - 5 %    BASOPHILS 0 0 - 2 %    ABS. NEUTROPHILS 5.6 1.8 - 8.0 K/UL    ABS. LYMPHOCYTES 1.0 0.9 - 3.6 K/UL    ABS. MONOCYTES 1.0 0.05 - 1.2 K/UL    ABS. EOSINOPHILS 0.1 0.0 - 0.4 K/UL    ABS.  BASOPHILS 0.0 0.0 - 0.06 K/UL    DF AUTOMATED     METABOLIC PANEL, BASIC    Collection Time: 11/23/17  2:36 AM   Result Value Ref Range    Sodium 135 (L) 136 - 145 mmol/L    Potassium 4.2 3.5 - 5.5 mmol/L    Chloride 103 100 - 108 mmol/L    CO2 25 21 - 32 mmol/L    Anion gap 7 3.0 - 18 mmol/L    Glucose 95 74 - 99 mg/dL    BUN 45 (H) 7.0 - 18 MG/DL    Creatinine 0.68 0.6 - 1.3 MG/DL    BUN/Creatinine ratio 66 (H) 12 - 20      GFR est AA >60 >60 ml/min/1.73m2    GFR est non-AA >60 >60 ml/min/1.73m2    Calcium 7.8 (L) 8.5 - 10.1 MG/DL       Signed By: Trinity Charles MD     November 23, 2017 12:47 PM

## 2017-11-24 NOTE — ROUTINE PROCESS
Pt is stable, no distress noted, + pedal pulse, Pt denies pain, numbness and tingling, will continue to monitor.

## 2017-11-24 NOTE — PROGRESS NOTES
[facility] progress note    POD#  5 Following  L hip IT fracture repair     S:  Reports mild left thigh pain today    O:    Temp: 98.4 °F (36.9 °C) (11/24/17 1242) Pulse (Heart Rate): 84 (11/24/17 1242) Resp Rate: 16 (11/24/17 1242) BP: 105/54 (11/24/17 1242) O2 Sat (%): 91 % (11/24/17 1242) Weight: 44 kg (97 lb) (11/19/17 0941)     Hip wounds closed, no drainage. Expected ecchymosis at hip and groin present  Intact EHL, FHL, GS, TA motor function  L3-S1 sensation intact  Calf soft, nontender  DP pulse palpable    Labs:  hgb- 8.4    O:  -no more active drainage from wound.   No Evidence of hematoma  -Weight bearing status: as tolerated  -Diet: cardiac  -DVT prophylaxis: lovenox  -Pain control: PRN tylenol, norco  -Dispo: SNF when stable

## 2017-11-24 NOTE — ROUTINE PROCESS
Pt is in stable condition. Alert and oriented . VSS. Denies any acute distress, chest pain or SOB. Dressing to left leg and hip is CDI with ace wrap. Pt has + pulses, +sensation. Skin is warm to touch. Pt denies tingling and numbness. Cap refill <3 sec. Verbalizes No further needs. SCD's in place, ICS performed,  Call bell is within reach, bed in low position. Will continue to monitor.

## 2017-11-24 NOTE — PROGRESS NOTES
[facility] Hospitalist Group  Progress Note    Patient: Nate Moody Age: 80 y.o. : 4/3/1925 MR#: [id number] SSN: [id number]  Date: 2017     Subjective:     Seen with daughter, daughter's  @ bedside. Per family, pt has been a bit confused. Otherwise appears comfortable, sleeping in chair. Rouses to light verbal stim. Denies pain, F/C, N/V, CP or SOB. Discussed current issues and plan of care. Assessment/Plan:   1. L hip fx s/p repair - ORIF on . Continue post-op care. Lovenox held for bleeding, now on heparin for DVT proph. Total of 3u PRBC xfused. H/H stable, hemodynamically stable. PT/OT/Dispo planning. If no recurrent bleed/ooze, switch to Lovenox daily. 2. Acute post-op blood loss anemia - hemodynamically stable. As per #1.   3. AStenosis - by hx. No acute issues. Last echo with EF 60%, no obvious wall motion abnml, AValve mean gradient 36mmHg, estimated valve area 0.75cm^2. Seen by cardiology 2017, no indication for valve replacement. Outpt f/u.  4. Urinary retention - resolved. 5. Acute metabolic encephalopathy - transient, supportive care. Seems to be stable, will continue to follow. 6. Fall precautions, PT/OT/Dispo.     Additional Notes:      Case discussed with:  [x]Patient  [x]Family  []Nursing  [x]Case Management  DVT Prophylaxis:  []Lovenox  [x]Hep SQ  []SCDs  []Coumadin   []On Heparin gtt    Objective:   VS:   Visit Vitals    /54 (BP 1 Location: Right arm, BP Patient Position: At rest)    Pulse 84    Temp 98.4 °F (36.9 °C)    Resp 16    Ht 5' 1\" (1.549 m)    Wt 44 kg (97 lb)    SpO2 91%    BMI 18.94 kg/m2      Tmax/24hrs: Temp (24hrs), Av.7 °F (36.5 °C), Min:97.3 °F (36.3 °C), Max:98.4 °F (36.9 °C)      Intake/Output Summary (Last 24 hours) at 17 1509  Last data filed at 17 1242   Gross per 24 hour   Intake              200 ml   Output              300 ml   Net             -100 ml       General:  Sleeping in chair, rouses to light verbal stim. Alert. NAD. Cardiovascular:  RRR. Pulmonary:  CTA B ant.   GI:  Soft, NT/ND, NABS. Extremities:  No CT or edema. L hip op site dressed, c/d/i. Additional:      Labs:    Recent Results (from the past 24 hour(s))   CBC WITH AUTOMATED DIFF    Collection Time: 11/24/17  1:30 AM   Result Value Ref Range    WBC 8.4 4.6 - 13.2 K/uL    RBC 2.68 (L) 4.20 - 5.30 M/uL    HGB 8.4 (L) 12.0 - 16.0 g/dL    HCT 26.1 (L) 35.0 - 45.0 %    MCV 97.4 (H) 74.0 - 97.0 FL    MCH 31.3 24.0 - 34.0 PG    MCHC 32.2 31.0 - 37.0 g/dL    RDW 15.8 (H) 11.6 - 14.5 %    PLATELET 579 (L) 898 - 420 K/uL    MPV 11.8 9.2 - 11.8 FL    NEUTROPHILS 75 (H) 40 - 73 %    LYMPHOCYTES 12 (L) 21 - 52 %    MONOCYTES 12 (H) 3 - 10 %    EOSINOPHILS 1 0 - 5 %    BASOPHILS 0 0 - 2 %    ABS. NEUTROPHILS 6.2 1.8 - 8.0 K/UL    ABS. LYMPHOCYTES 1.0 0.9 - 3.6 K/UL    ABS. MONOCYTES 1.0 0.05 - 1.2 K/UL    ABS. EOSINOPHILS 0.1 0.0 - 0.4 K/UL    ABS.  BASOPHILS 0.0 0.0 - 0.06 K/UL    DF AUTOMATED         Signed By: Elizabeth Gold MD     November 24, 2017 12:47 PM

## 2017-11-24 NOTE — PROGRESS NOTES
Spoke with pt's daughterHarpreet Melrose [phone], regarding SNF placement. Daughter gives verbal FOC for [facility]  Referral submitted to [facility] via Jennifer Castellon.

## 2017-11-24 NOTE — ROUTINE PROCESS
Bedside and Verbal shift change report given to Theodora Portillo (oncoming nurse) by Aletha Rowland (offgoing nurse). Report included the following information SBAR, Kardex, MAR and Recent Results.     SITUATION:    Code Status: Full Code   Reason for Admission: Subtrochanteric fracture ([address]   Intertrochanteric fracture of left hip ([address]   left hip fracture    [facility] day: 5   Problem List:       Hospital Problems  Date Reviewed: 11/19/2017          Codes Class Noted POA    * (Principal)Subtrochanteric fracture ([address] ICD-10-CM: H76.85EI  ICD-9-CM: 820.22  11/19/2017 Yes        Intertrochanteric fracture of left hip ([facility] ICD-10-CM: I37.682E  ICD-9-CM: 820.21  11/19/2017 Unknown        Mitral regurgitation and aortic stenosis ICD-10-CM: I08.0  ICD-9-CM: 396.2  8/1/2017 Yes              BACKGROUND:    Past Medical History:   Past Medical History:   Diagnosis Date    Ill-defined condition     synope         Patient taking anticoagulants yes     ASSESSMENT:    Changes in Assessment Throughout Shift: no     Patient has Central Line: no    Patient has Reed Cath: no       Last Vitals:     Vitals:    11/23/17 1551 11/24/17 0751 11/24/17 1242 11/24/17 1615   BP: 117/66 137/48 105/54 134/49   Pulse: 88 72 84 89   Resp: 16 16 16 16   Temp: 97.5 °F (36.4 °C) 97.3 °F (36.3 °C) 98.4 °F (36.9 °C) 97.8 °F (36.6 °C)   SpO2: 90% 93% 91% 96%   Weight:       Height:            IV and DRAINS (will only show if present)   [REMOVED] Peripheral IV 11/19/17 Right Wrist-Site Assessment: Clean, dry, & intact  [REMOVED] Peripheral IV 11/20/17 Right Forearm-Site Assessment: Clean, dry, & intact  Peripheral IV 11/21/17 Left Forearm-Site Assessment: Clean, dry, & intact  [REMOVED] Peripheral IV 11/19/17 Right Forearm-Site Assessment: Clean, dry, & intact  Peripheral IV 11/24/17 Left Wrist-Site Assessment: Clean, dry, & intact     WOUND (if present)   Wound Type:  Left hip and leg   Dressing present Dressing Present : Intact, not due to be changed   Wound Concerns/Notes:  none     PAIN    Pain Assessment    Pain Intensity 1: 4 (11/23/17 1011)    Pain Location 1: Hip    Pain Intervention(s) 1: Medication (see MAR)    Patient Stated Pain Goal: 0  o Interventions for Pain:  Schedule tylenol  o Intervention effective: yes  o Time of last intervention: see mar   o Reassessment Completed: yes      Last 3 Weights:  Last 3 Recorded Weights in this Encounter    11/19/17 0941   Weight: 44 kg (97 lb)     Weight change:      INTAKE/OUPUT    Current Shift: 11/24 0701 - 11/24 1900  In: 200 [P.O.:200]  Out: 600 [Urine:600]    Last three shifts: 11/22 1901 - 11/24 0700  In: 0   Out: 200 [Urine:200]     LAB RESULTS     Recent Labs      11/24/17   0130  11/23/17   0236   WBC  8.4  7.7   HGB  8.4*  7.9*   HCT  26.1*  23.8*   PLT  110*  101*        Recent Labs      11/23/17   0236   NA  135*   K  4.2   GLU  95   BUN  45*   CREA  0.68   CA  7.8*       RECOMMENDATIONS AND DISCHARGE PLANNING     1. Pending tests/procedures/ Plan of Care or Other Needs: no     2. Discharge plan for patient and Needs/Barriers: no    3. Estimated Discharge Date: n/a Posted on Whiteboard in Patients Room: yes      4. The patient's care plan was reviewed with the oncoming nurse. \"HEALS\" SAFETY CHECK      Fall Risk    Total Score: 3    Safety Measures: Safety Measures: Bed/Chair-Wheels locked, Bed in low position, Call light within reach, Gripper socks    A safety check occurred in the patient's room between off going nurse and oncoming nurse listed above.     The safety check included the below items  Area Items   H  High Alert Medications - Verify all high alert medication drips (heparin, PCA, etc.)   E  Equipment - Suction is set up for ALL patients (with yanker)  - Red plugs utilized for all equipment (IV pumps, etc.)  - WOWs wiped down at end of shift.  - Room stocked with oxygen, suction, and other unit-specific supplies   A  Alarms - Bed alarm is set for fall risk patients  - Ensure chair alarm is in place and activated if patient is up in a chair   L  Lines - Check IV for any infiltration  - Reed bag is empty if patient has a Reed   - Tubing and IV bags are labeled   S  Safety   - Room is clean, patient is clean, and equipment is clean. - Hallways are clear from equipment besides carts. - Fall bracelet on for fall risk patients  - Ensure room is clear and free of clutter  - Suction is set up for ALL patients (with domenicoker)  - Hallways are clear from equipment besides carts.    - Isolation precautions followed, supplies available outside room, sign posted     [facility]

## 2017-11-24 NOTE — ROUTINE PROCESS
Pt is stable, Pt ambulated to bedside commode with walker and assist from nurse, Small bowel movement with brown and tarry. No distress noted, + pedal pulses and sensation, Assisted pt back in bed. Bed alarm on, will continue to monitor.

## 2017-11-24 NOTE — PROGRESS NOTES
Problem: Self Care Deficits Care Plan (Adult)  Goal: *Acute Goals and Plan of Care (Insert Text)  Occupational Therapy Goals  Initiated 11/20/2017 within 7 day(s). 1.  Patient will perform grooming with supervision/set-up   2. Patient will perform upper body dressing with supervision/set-up. 3.  Patient will perform lower body dressing with moderate assistance . 4. Patient will perform BSC transfers with minimal assistance/contact guard assist.  5.  Patient will perform all aspects of toileting with minimal assistance/contact guard assist.  6.  Patient will participate in upper extremity therapeutic exercise/activities with supervision/set-up in preparation for self care tasks   Outcome: Progressing Towards Goal  Occupational Therapy TREATMENT    Patient: Crispin Luevano (80 y.o. female)  Date: 11/24/2017  Diagnosis: Subtrochanteric fracture (HCC)  Intertrochanteric fracture of left hip (HCC)  left hip fracture Subtrochanteric fracture (HCC)  Procedure(s) (LRB):  FEMORAL INTERTROCHANTERIC NAIL INSERTION (Left) 5 Days Post-Op  Precautions: Fall, WBAT (LLE)  Chart, occupational therapy assessment, plan of care, and goals were reviewed. ASSESSMENT:  Pt is eating breakfast upon entry, agreeable initially to maneuver to EOB. Pt noted to have soiled pads, and perseverating on that. Pt required Mod A to maneuver to EOB, continuously trying to pull wet pads from under her. Pt is difficult to redirect to participate in ADL at EOB, pt returns to sup w/CGA, and was able to maneuver in bed for pads change w/Min/Mod A. Pt completed ADL grooming task at bed level w/Supervision and VCs for initiation of tasks.   Progression toward goals:  []          Improving appropriately and progressing toward goals  [x]          Improving slowly and progressing toward goals  []          Not making progress toward goals and plan of care will be adjusted     PLAN:  Patient continues to benefit from skilled intervention to address the above impairments. Continue treatment per established plan of care. Discharge Recommendations:  Omid Lala  Further Equipment Recommendations for Discharge:  N/A      G-CODES:     Self Care  Current  CL= 60-79%   Goal  CJ= 20-39%. The severity rating is based on the Level of Assistance required for Functional Mobility and ADLs. SUBJECTIVE:   Patient stated Am I really in the hospital?    OBJECTIVE DATA SUMMARY:   Cognitive/Behavioral Status:  Neurologic State: Confused  Orientation Level: Oriented to person, Disoriented to place, Disoriented to situation  Cognition: Follows commands       Functional Mobility and Transfers for ADLs:   Bed Mobility:     Supine to Sit: Moderate assistance     Scooting: Moderate assistance   Transfers:  Supine->sit : Mod A   ADL Intervention:     Grooming  Grooming Assistance: Supervision/set up (bed level)  Washing Face: Supervision/set-up  Washing Hands: Supervision/set-up  Brushing/Combing Hair: Supervision/set-up     Pain:  Pt reports 0/10 pain or discomfort prior to treatment.    Pt reports 0/10 pain or discomfort post treatment. Activity Tolerance:    Fair    Please refer to the flowsheet for vital signs taken during this treatment.   After treatment:   []  Patient left in no apparent distress sitting up in chair  [x]  Patient left in no apparent distress in bed  [x]  Call bell left within reach  [x]  Nursing notified  []  Caregiver present  [x]  Bed alarm activated    [facility], GODINEZ/L  Time Calculation: 14 mins

## 2017-11-24 NOTE — PROGRESS NOTES
Bedside shift change report given to 81 Lucina Bowman (oncoming nurse) by Carri Galvez (offgoing nurse). Report included the following information SBAR, Kardex, ED Summary, OR Summary, Procedure Summary, Intake/Output, MAR and Recent Results.

## 2017-11-25 NOTE — PROGRESS NOTES
Problem: Mobility Impaired (Adult and Pediatric)  Goal: *Acute Goals and Plan of Care (Insert Text)  Physical Therapy Goals  Initiated 11/20/2017 and to be accomplished within 7 day(s)  1. Patient will move from supine to sit and sit to supine  in bed with minimal assistance/contact guard assist.     2.  Patient will transfer from bed to chair and chair to bed with minimal assistance/contact guard assist using the least restrictive device. 3.  Patient will perform sit to stand with minimal assistance/contact guard assist.  4.  Patient will ambulate with minimal assistance/contact guard assist for 50 feet with the least restrictive device. Time:1026     Pt is currently unavailable for PT intervention 2/2 receiving blood transfusion. Will follow up as pt availability allows.     Neri Grossman PTA  11/25/2017

## 2017-11-25 NOTE — PROGRESS NOTES
0500-Pt confused. No C/O pain. Visit Vitals    BP (!) 92/34 (BP 1 Location: Right arm, BP Patient Position: At rest)    Pulse (!) 53    Temp 97.2 °F (36.2 °C)    Resp 16    Ht 5' 1\" (1.549 m)    Wt 44 kg (97 lb)    SpO2 94%    BMI 18.94 kg/m2     MD aware of drop in Hgb. Order to transfuse.

## 2017-11-25 NOTE — ROUTINE PROCESS
Bedside and Verbal shift change report given to JM pretty (oncoming nurse) by Jess Magallanes RN (offgoing nurse). Report included the following information SBAR, Kardex, MAR and Recent Results. SITUATION:    Code Status: Full Code   Reason for Admission: Subtrochanteric fracture ([address]   Intertrochanteric fracture of left hip ([address]   left hip fracture    [facility] day: 6   Problem List:       Hospital Problems  Date Reviewed: 11/19/2017          Codes Class Noted POA    * (Principal)Subtrochanteric fracture ([address] ICD-10-CM: M36.29OE  ICD-9-CM: 820.22  11/19/2017 Yes        Intertrochanteric fracture of left hip ([facility] ICD-10-CM: E78.473W  ICD-9-CM: 820.21  11/19/2017 Unknown        Mitral regurgitation and aortic stenosis ICD-10-CM: I08.0  ICD-9-CM: 396.2  8/1/2017 Yes              BACKGROUND:    Past Medical History:   Past Medical History:   Diagnosis Date    Ill-defined condition     synope         Patient taking anticoagulants yes     ASSESSMENT:    Changes in Assessment Throughout Shift: no     Patient has Central Line: no Reasons if yes: .  Patient has Reed Cath: no Reasons if yes: .       Last Vitals:     Vitals:    11/25/17 1125 11/25/17 1139 11/25/17 1350 11/25/17 1534   BP: (!) 105/37 (!) 110/39 128/59 124/60   Pulse: (!) 50 (!) 54 (!) 58 (!) 7   Resp: 16 16 18 18   Temp: 97.7 °F (36.5 °C) 97 °F (36.1 °C) 98.9 °F (37.2 °C) 98.7 °F (37.1 °C)   SpO2:  97% 96% 94%   Weight:       Height:            IV and DRAINS (will only show if present)   [REMOVED] Peripheral IV 11/19/17 Right Wrist-Site Assessment: Clean, dry, & intact  [REMOVED] Peripheral IV 11/20/17 Right Forearm-Site Assessment: Clean, dry, & intact  [REMOVED] Peripheral IV 11/21/17 Left Forearm-Site Assessment: Clean, dry, & intact  [REMOVED] Peripheral IV 11/19/17 Right Forearm-Site Assessment: Clean, dry, & intact  Peripheral IV 11/24/17 Left Wrist-Site Assessment: Clean, dry, & intact  Peripheral IV 11/25/17 Right Arm-Site Assessment: Clean, dry, & intact     WOUND (if present)   Wound Type:  Lt hip incision   Dressing present Dressing Present : Intact, not due to be changed   Wound Concerns/Notes:  none     PAIN    Pain Assessment    Pain Intensity 1: 0 (11/24/17 1914)    Pain Location 1: Hip    Pain Intervention(s) 1: Medication (see MAR)    Patient Stated Pain Goal: 0  o Interventions for Pain:  none  o Intervention effective: .  o Time of last intervention: .   o Reassessment Completed: .  Last 3 Weights:  Last 3 Recorded Weights in this Encounter    11/19/17 0941   Weight: 44 kg (97 lb)     Weight change:      INTAKE/OUPUT    Current Shift: 11/25 0701 - 11/25 1900  In: 271.7   Out: -     Last three shifts: 11/23 1901 - 11/25 0700  In: 556 [P.O.:556]  Out: 600 [Urine:600]     LAB RESULTS     Recent Labs      11/25/17   0129  11/24/17   0130  11/23/17   0236   WBC   --   8.4  7.7   HGB  7.1*  8.4*  7.9*   HCT  21.7*  26.1*  23.8*   PLT   --   110*  101*        Recent Labs      11/23/17   0236   NA  135*   K  4.2   GLU  95   BUN  45*   CREA  0.68   CA  7.8*       RECOMMENDATIONS AND DISCHARGE PLANNING     1. Pending tests/procedures/ Plan of Care or Other Needs: no     2. Discharge plan for patient and Needs/Barriers: SNF    3. Estimated Discharge Date: . Posted on Whiteboard in [address] [place] [address] Room: .      4. The patient's care plan was reviewed with the oncoming nurse. \"HEALS\" SAFETY CHECK      Fall Risk    Total Score: 5    Safety Measures: Safety Measures: Bed/Chair alarm on, Bed/Chair-Wheels locked, Bed in low position, Call light within reach, Gripper socks, Visitors at bedside    A safety check occurred in the patient's room between off going nurse and oncoming nurse listed above.     The safety check included the below items  Area Items   H  High Alert Medications - Verify all high alert medication drips (heparin, PCA, etc.)   E  Equipment - Suction is set up for ALL patients (with domenicoker)  - Red plugs utilized for all equipment (IV pumps, etc.)  - WOWs wiped down at end of shift.  - Room stocked with oxygen, suction, and other unit-specific supplies   A  Alarms - Bed alarm is set for fall risk patients  - Ensure chair alarm is in place and activated if patient is up in a chair   L  Lines - Check IV for any infiltration  - Reed bag is empty if patient has a Reed   - Tubing and IV bags are labeled   S  Safety   - Room is clean, patient is clean, and equipment is clean. - Hallways are clear from equipment besides carts. - Fall bracelet on for fall risk patients  - Ensure room is clear and free of clutter  - Suction is set up for ALL patients (with yanker)  - Hallways are clear from equipment besides carts.    - Isolation precautions followed, supplies available outside room, sign posted     Fani Sterling RN

## 2017-11-25 NOTE — PROGRESS NOTES
[facility]  Progress Note    Patient: Sofiya Diaz Age: 80 y.o. : 4/3/1925 MR#: [id number] SSN: [id number]  Date: 2017     Subjective:     Denies pain, F/C, N/V, CP or SOB. Was given PRBC today. Confusion noted by nursing staff. Assessment/Plan:   1. L hip fx s/p repair - ORIF on . Continue post-op care. Lovenox held for bleeding, now on heparin for DVT proph. Total of 3u PRBC xfused with another 2u PRBC today. Hemodynamically stable, recheck H/H in AM. PT/OT/Dispo planning. No bleed/bruise, continue Lovenox for DVT proph. Had some oozing post-op from op site and Lovenox was held at that time. 2. Acute post-op blood loss anemia - hemodynamically stable. As per #1.   3. AStenosis - by hx. No acute issues. Last echo with EF 60%, no obvious wall motion abnml, AValve mean gradient 36mmHg, estimated valve area 0.75cm^2. Seen by cardiology 2017, no indication for valve replacement. Outpt f/u.  4. Urinary retention - resolved. 5. Acute metabolic encephalopathy - transient, supportive care. Awake, pleasant and conversant to me. Will continue to follow clinically. 6. Fall precautions, PT/OT/Dispo. Additional Notes:      Case discussed with:  [x]Patient  []Family  [x]Nursing  []Case Management  DVT Prophylaxis:  []Lovenox  [x]Hep SQ  []SCDs  []Coumadin   []On Heparin gtt    Objective:   VS:   Visit Vitals    /60 (BP 1 Location: Left arm)    Pulse (!) 7    Temp 98.7 °F (37.1 °C)    Resp 18    Ht 5' 1\" (1.549 m)    Wt 44 kg (97 lb)    SpO2 94%    BMI 18.94 kg/m2      Tmax/24hrs: Temp (24hrs), Av.8 °F (36.6 °C), Min:97 °F (36.1 °C), Max:98.9 °F (37.2 °C)      Intake/Output Summary (Last 24 hours) at 17 1707  Last data filed at 17 1350   Gross per 24 hour   Intake            627.7 ml   Output                0 ml   Net            627.7 ml       General:  Awake, alert, NAD. Cardiovascular:  RRR.   Pulmonary:  CTA B ant.   GI: Soft, NT/ND, NABS. Extremities:  No CT or edema. L hip op site dressed, c/d/i. Additional:      Labs:    Recent Results (from the past 24 hour(s))   HGB & HCT    Collection Time: 11/25/17  1:29 AM   Result Value Ref Range    HGB 7.1 (L) 12.0 - 16.0 g/dL    HCT 21.7 (L) 35.0 - 45.0 %   TYPE + CROSSMATCH    Collection Time: 11/25/17  5:15 AM   Result Value Ref Range    Crossmatch Expiration 11/28/2017     ABO/Rh(D) [place] POSITIVE     Antibody screen NEG     CALLED TO:  KM Mensah, 5S, [id number] AT 9101 BY [facility].      Unit number [id number]     Blood component type RC LR AS1     Unit division 00     Status of unit ISSUED     Crossmatch result Compatible     Unit number [id number]     Blood component type RC LR AS1     Unit division 00     Status of unit ISSUED     Crossmatch result Compatible        Signed By: Severiano Dill, MD     November 25, 2017 12:47 PM

## 2017-11-26 NOTE — PROGRESS NOTES
Patient is lying comfortable alert awake and oriented to self  Only. Area to Lhip with 4x4 dry and intact no s/s of distress or sob. No complain of pain.

## 2017-11-26 NOTE — PROGRESS NOTES
[facility] progress note     POD# 7 Following  L hip IT fracture repair      S:  margarita in bed, received 2 more units of pRBC's yesterday due to low H&H. No signs of active bleeding on examination.     O:     Temp: 98 °F (36.7 °C) (11/26/17 0802) Pulse (Heart Rate): (!) 57 (11/26/17 0802) Resp Rate: 18 (11/26/17 0802) BP: 118/69 (11/26/17 0802) O2 Sat (%): 94 % (11/26/17 0802) Weight: 44 kg (97 lb) (11/19/17 0941)        Hip wounds closed, no drainage.   Expected ecchymosis at hip and groin present  Intact EHL, FHL, GS, TA motor function  L3-S1 sensation intact  Calf soft, nontender  DP pulse palpable     Labs:  hgb- 9.5     O:  -no evidence of active bleeding or hematoma, no surgicql intervention indicated.  -Weight bearing status: as tolerated  -Diet: cardiac  -DVT prophylaxis: heparin, recommend 28 days pending stable H&H  -Pain control: PRN tylenol, norco  -Dispo: SNF when stable

## 2017-11-26 NOTE — ROUTINE PROCESS
Bedside and Verbal shift change report given to Ivan Huitron RN (oncoming nurse) by Ayo Arrington RN (offgoing nurse). Report included the following information SBAR, Kardex, MAR and Recent Results.     SITUATION:    Code Status: Full Code   Reason for Admission: Subtrochanteric fracture ([facility]   Intertrochanteric fracture of left hip ([address]   left hip fracture    [facility] day: 7   Problem List:       Hospital Problems  Date Reviewed: 11/19/2017          Codes Class Noted POA    * (Principal)Subtrochanteric fracture ([facility] ICD-10-CM: X60.37RM  ICD-9-CM: 820.22  11/19/2017 Yes        Intertrochanteric fracture of left hip ([facility] ICD-10-CM: S88.233N  ICD-9-CM: 820.21  11/19/2017 Unknown        Mitral regurgitation and aortic stenosis ICD-10-CM: I08.0  ICD-9-CM: 396.2  8/1/2017 Yes              BACKGROUND:    Past Medical History:   Past Medical History:   Diagnosis Date    Ill-defined condition     synope         Patient taking anticoagulants yes     ASSESSMENT:    Changes in Assessment Throughout Shift: none     Patient has Central Line: no Reasons if yes:    Patient has Reed Cath: no Reasons if yes:       Last Vitals:     Vitals:    11/25/17 1350 11/25/17 1534 11/25/17 1931 11/26/17 0418   BP: 128/59 124/60 120/50 124/49   Pulse: (!) 58 63 60 (!) 57   Resp: 18 18 16 18   Temp: 98.9 °F (37.2 °C) 98.7 °F (37.1 °C) 97 °F (36.1 °C) 97.7 °F (36.5 °C)   SpO2: 96% 94% 95% 94%   Weight:       Height:            IV and DRAINS (will only show if present)   [REMOVED] Peripheral IV 11/19/17 Right Wrist-Site Assessment: Clean, dry, & intact  [REMOVED] Peripheral IV 11/20/17 Right Forearm-Site Assessment: Clean, dry, & intact  [REMOVED] Peripheral IV 11/21/17 Left Forearm-Site Assessment: Clean, dry, & intact  [REMOVED] Peripheral IV 11/19/17 Right Forearm-Site Assessment: Clean, dry, & intact  [REMOVED] Peripheral IV 11/24/17 Left Wrist-Site Assessment: Clean, dry, & intact  Peripheral IV 11/25/17 Right Arm-Site Assessment: Clean, dry, & intact     WOUND (if present)   Wound Type:  hip   Dressing present Dressing Present : Intact, not due to be changed   Wound Concerns/Notes:  bruising     PAIN    Pain Assessment    Pain Intensity 1: 0 (11/25/17 1911)    Pain Location 1: Hip    Pain Intervention(s) 1: Medication (see MAR)    Patient Stated Pain Goal: 0  o Interventions for Pain:  See mar  o Intervention effective: yes  o Time of last intervention: see mar   o Reassessment Completed: yes      Last 3 Weights:  Last 3 Recorded Weights in this Encounter    11/19/17 0941   Weight: 44 kg (97 lb)     Weight change:      INTAKE/OUPUT    Current Shift: 11/25 1901 - 11/26 0700  In: 990 [P.O.:120; I.V.:870]  Out: -     Last three shifts: 11/24 0701 - 11/25 1900  In: 827.7 [P.O.:556]  Out: 600 [Urine:600]     LAB RESULTS     Recent Labs      11/26/17   0100  11/25/17   0129  11/24/17   0130   WBC  8.2   --   8.4   HGB  9.5*  7.1*  8.4*   HCT  28.4*  21.7*  26.1*   PLT  150   --   110*        Recent Labs      11/26/17   0100   NA  139   K  4.1   GLU  90   BUN  63*   CREA  0.63   CA  7.7*       RECOMMENDATIONS AND DISCHARGE PLANNING     1. Pending tests/procedures/ Plan of Care or Other Needs: none     2. Discharge plan for patient and Needs/Barriers: SNF    3. Estimated Discharge Date: unknown Posted on Whiteboard in Patients Room: no      4. The patient's care plan was reviewed with the oncoming nurse. \"HEALS\" SAFETY CHECK      Fall Risk    Total Score: 5    Safety Measures: Safety Measures: Bed/Chair alarm on, Bed/Chair-Wheels locked, Bed in low position, Call light within reach, Side rails X 3    A safety check occurred in the patient's room between off going nurse and oncoming nurse listed above.     The safety check included the below items  Area Items   H  High Alert Medications - Verify all high alert medication drips (heparin, PCA, etc.)   E  Equipment - Suction is set up for ALL patients (with erickson)  - Red plugs utilized for all equipment (IV pumps, etc.)  - WOWs wiped down at end of shift.  - Room stocked with oxygen, suction, and other unit-specific supplies   A  Alarms - Bed alarm is set for fall risk patients  - Ensure chair alarm is in place and activated if patient is up in a chair   L  Lines - Check IV for any infiltration  - Reed bag is empty if patient has a Reed   - Tubing and IV bags are labeled   S  Safety   - Room is clean, patient is clean, and equipment is clean. - Hallways are clear from equipment besides carts. - Fall bracelet on for fall risk patients  - Ensure room is clear and free of clutter  - Suction is set up for ALL patients (with yanker)  - Hallways are clear from equipment besides carts.    - Isolation precautions followed, supplies available outside room, sign posted     Jose Kothari RN

## 2017-11-26 NOTE — ROUTINE PROCESS
Care of patient turned over to [facility]. SBAR report given. Patient condition is stable. Stool for hemocult sent to lab. Patient remains confused. Bed alarm on.

## 2017-11-26 NOTE — PROGRESS NOTES
[facility] Hospitalist Group  Progress Note    Patient: Eriberto Goodrich Age: 80 y.o. : 4/3/1925 MR#: [id number] SSN: [id number]  Date: 2017     Subjective:     No F/C, N/V, CP, SOB. No pain c/o. Spoke with pt's daughter on phone, discussed current medical issues and plan of care. She reports pt continues to have episodes of confusion, and appears generally weak. Discussed heme+ stool, and given pt's age, she would like to avoid further invasive procedures. She reports pt has living will and advance directives, and that pt is DNR. Assessment/Plan:   1. L hip fx s/p repair - ORIF on . Continue post-op care. Lovenox held for bleeding, now on heparin for DVT proph. Total of 3u PRBC xfused with another 2u PRBC today. PT/OT/Dispo planning. No active bleed/bruise (though heme+ stool), continue Lovenox for DVT proph. Had some oozing post-op from op site and Lovenox was held at that time. H/H stable, hemodynamically stable. 2. Acute post-op blood loss anemia - hemodynamically stable. As per #1. Any drop in H/H or signs of hemodynamic instability, any bleed, will d/c chemical DVT prophylaxis. 3. AStenosis - by hx. No acute issues. Last echo with EF 60%, no obvious wall motion abnml, AValve mean gradient 36mmHg, estimated valve area 0.75cm^2. Seen by cardiology 2017, no indication for valve replacement. Outpt f/u.  4. Urinary retention - resolved. 5. Acute metabolic encephalopathy - transient, supportive care. Awake, pleasant and conversant to me on multiple days. Continue supportive care. 6. Heme+ stool - H/H stable. On Lovenox for DVT prophylaxis. No invasive w/u per daughter. Continue to follow H/H. Any signs bleed/instability, d/c Lovenox. 7. Fall precautions, PT/OT/Dispo.     Additional Notes:      Case discussed with:  [x]Patient  [x]Family  []Nursing  []Case Management  DVT Prophylaxis:  [x]Lovenox  []Hep SQ  []SCDs  []Coumadin   []On Heparin gtt    Objective: VS:   Visit Vitals    /66 (BP 1 Location: Right arm, BP Patient Position: At rest)    Pulse (!) 54    Temp 97.5 °F (36.4 °C)    Resp 18    Ht 5' 1\" (1.549 m)    Wt 44 kg (97 lb)    SpO2 99%    BMI 18.94 kg/m2      Tmax/24hrs: Temp (24hrs), Av.4 °F (36.3 °C), Min:97 °F (36.1 °C), Max:98 °F (36.7 °C)      Intake/Output Summary (Last 24 hours) at 17 1710  Last data filed at 17   Gross per 24 hour   Intake              990 ml   Output                0 ml   Net              990 ml       General:  Awake, tired overall, alert, NAD. Cardiovascular:  RRR. Pulmonary:  CTA B ant.   GI:  Soft, NT/ND, NABS. Extremities:  No CT or edema. L hip op site dressed, c/d/i. Additional:      Labs:    Recent Results (from the past 24 hour(s))   METABOLIC PANEL, BASIC    Collection Time: 17  1:00 AM   Result Value Ref Range    Sodium 139 136 - 145 mmol/L    Potassium 4.1 3.5 - 5.5 mmol/L    Chloride 109 (H) 100 - 108 mmol/L    CO2 22 21 - 32 mmol/L    Anion gap 8 3.0 - 18 mmol/L    Glucose 90 74 - 99 mg/dL    BUN 63 (H) 7.0 - 18 MG/DL    Creatinine 0.63 0.6 - 1.3 MG/DL    BUN/Creatinine ratio 100 (H) 12 - 20      GFR est AA >60 >60 ml/min/1.73m2    GFR est non-AA >60 >60 ml/min/1.73m2    Calcium 7.7 (L) 8.5 - 10.1 MG/DL   CBC WITH AUTOMATED DIFF    Collection Time: 17  1:00 AM   Result Value Ref Range    WBC 8.2 4.6 - 13.2 K/uL    RBC 3.08 (L) 4.20 - 5.30 M/uL    HGB 9.5 (L) 12.0 - 16.0 g/dL    HCT 28.4 (L) 35.0 - 45.0 %    MCV 92.2 74.0 - 97.0 FL    MCH 30.8 24.0 - 34.0 PG    MCHC 33.5 31.0 - 37.0 g/dL    RDW 16.9 (H) 11.6 - 14.5 %    PLATELET 344 585 - 987 K/uL    MPV 9.8 9.2 - 11.8 FL    NEUTROPHILS 61 40 - 73 %    LYMPHOCYTES 24 21 - 52 %    MONOCYTES 12 (H) 3 - 10 %    EOSINOPHILS 2 0 - 5 %    BASOPHILS 1 0 - 2 %    ABS. NEUTROPHILS 5.1 1.8 - 8.0 K/UL    ABS. LYMPHOCYTES 2.0 0.9 - 3.6 K/UL    ABS. MONOCYTES 1.0 0.05 - 1.2 K/UL    ABS. EOSINOPHILS 0.2 0.0 - 0.4 K/UL    ABS. BASOPHILS 0.0 0.0 - 0.06 K/UL    DF AUTOMATED     OCCULT BLOOD, STOOL    Collection Time: 11/26/17 12:00 PM   Result Value Ref Range    Occult blood, stool POSITIVE (A) NEG         Signed By: Nitin Blair MD     November 26, 2017 12:47 PM

## 2017-11-26 NOTE — PROGRESS NOTES
Problem: Mobility Impaired (Adult and Pediatric)  Goal: *Acute Goals and Plan of Care (Insert Text)  Physical Therapy Goals  Initiated 11/20/2017 and to be accomplished within 7 day(s)  1. Patient will move from supine to sit and sit to supine  in bed with minimal assistance/contact guard assist.     2.  Patient will transfer from bed to chair and chair to bed with minimal assistance/contact guard assist using the least restrictive device. 3.  Patient will perform sit to stand with minimal assistance/contact guard assist.  4.  Patient will ambulate with minimal assistance/contact guard assist for 50 feet with the least restrictive device. physical Therapy TREATMENT    Patient: Estefania Luevano (80 y.o. female)  Date: 11/26/2017  Diagnosis: Subtrochanteric fracture (HCC)  Intertrochanteric fracture of left hip (HCC)  left hip fracture Subtrochanteric fracture (HCC)  Procedure(s) (LRB):  FEMORAL INTERTROCHANTERIC NAIL INSERTION (Left) 7 Days Post-Op  Precautions: Fall, WBAT (LLE)  Chart, physical therapy assessment, plan of care and goals were reviewed. ASSESSMENT:  Pt partially asleep upon entering room. CNA checking vitals. BP = 110/46. Pt's HGB >9 following yesterday's transfusion. Pt agreeable to work with PTA once roused. Assistance required for bed mobility. Noted BM in bed while performing bed mobility. Caregiver retrieve's nursing staff who performs lobo-care while pt in standing w/ PTA support. Able to tolerate standing ~4-5 minutes.  ~3' of gait to bedside chair. VC's required for pt to properly sit. Performed seated therex, see below. Additional session of standing w/ weight shifting using RW, Anand/CGA. Returned to semi-reclined in bedside chair, caregiver present.     Progression toward goals:  [x]      Improving appropriately and progressing toward goals  []      Improving slowly and progressing toward goals  []      Not making progress toward goals and plan of care will be adjusted PLAN:  Patient continues to benefit from skilled intervention to address the above impairments. Continue treatment per established plan of care. Discharge Recommendations:  Skilled Nursing Facility  Further Equipment Recommendations for Discharge:  rolling walker and N/A     SUBJECTIVE:   Patient stated I can stand for a little bit.   Mobility [id number] Current  CL= 60-79%   Goal  CJ= 20-39%. The severity rating is based on the Other level of assistance required for functional mobility and ADLs. OBJECTIVE DATA SUMMARY:   Critical Behavior:  Neurologic State: Alert  Orientation Level: Oriented X4  Cognition: Follows commands     Functional Mobility Training:  Bed Mobility:  Rolling: Stand-by asssistance  Supine to Sit: Minimum assistance     Scooting: Minimum assistance         Transfers:  Sit to Stand: Minimum assistance  Stand to Sit: Minimum assistance           Balance:  Sitting: Impaired  Sitting - Static: Fair (occasional)  Sitting - Dynamic: Fair (occasional)  Standing: Impaired; With support  Standing - Static: Good  Standing - Dynamic : Fair  Ambulation/Gait Training:  Distance (ft): 3 Feet (ft)  Assistive Device: Walker, rolling  Ambulation - Level of Assistance: Minimal assistance        Gait Abnormalities: Antalgic;Decreased step clearance;Shuffling gait    Therapeutic Exercises:   Seated LAQ, marches, HR/TR x10 ea  Pain:  Pt pain was reported as  0 pre-treatment. Pt pain was reported as 8 post-treatment. Activity Tolerance:   Good  Please refer to the flowsheet for vital signs taken during this treatment.   After treatment:   [x] Patient left in no apparent distress sitting up in chair  [] Patient left in no apparent distress in bed  [x] Call bell left within reach  [x] Nursing notified  [x] Caregiver present  [] Bed alarm activated      Raven Perez PTA   Time Calculation: 29 mins

## 2017-11-27 NOTE — PROGRESS NOTES
[facility] Hospitalist Group  Progress Note    Patient: Yvone Cranker Age: 80 y.o. : 4/3/1925 MR#: [id number] [id number]: [id number]  Date: 2017     Subjective:     Denies pain, F/C, N/V, CP or SOB. Appears comfortable. Case d/w nursing. Receiving PRBC presently. Assessment/Plan:   1. L hip fx s/p repair - ORIF on . Continue post-op care. Lovenox held for bleeding, now on heparin for DVT proph. Total of 3u PRBC xfused with another 2u PRBC today. PT/OT/Dispo planning. Heme+ stool, no apparent active bleeding earlier, but have d/c'd Lovenox due to drop in H/H and heme+ stool. Had some oozing from op site post-op, so Lovenox was held then. Discussed with pt's daughter yesterday, would like to avoid further invasive procedures. See below. 2. Acute post-op blood loss anemia - hemodynamically stable. As per #1. Have d/c'd Lovenox. Will xfuse 2u PRBC and follow clinically. 3. AStenosis - by hx. No acute issues. Last echo with EF 60%, no obvious wall motion abnml, AValve mean gradient 36mmHg, estimated valve area 0.75cm^2. Seen by cardiology 2017, no indication for valve replacement. Outpt f/u.  4. Urinary retention - resolved. 5. Acute metabolic encephalopathy - transient, supportive care. Awake, pleasant and conversant to me on multiple days. Some intermittent confusion. Supportive care. 6. Heme+ stool - as above, off Lovenox. No invasive w/u per daughter. Continue to follow H/H.  7. Fall precautions, PT/OT/Dispo. If H/H stable, aim for xfer tomorrow. Attempted to contact family, no answer, left msg.      Additional Notes:      Case discussed with:  [x]Patient  []Family  [x]Nursing  [x]Case Management  DVT Prophylaxis:  []Lovenox  []Hep SQ  []SCDs  []Coumadin   []On Heparin gtt    Objective:   VS:   Visit Vitals    /62    Pulse 92    Temp 97.2 °F (36.2 °C)    Resp 14    Ht 5' 1\" (1.549 m)    Wt 44 kg (97 lb)    SpO2 92%    BMI 18.94 kg/m2      Tmax/24hrs: Temp (24hrs), Av.5 °F (36.4 °C), Min:97 °F (36.1 °C), Max:98 °F (36.7 °C)      Intake/Output Summary (Last 24 hours) at 17 1008  Last data filed at 17 0816   Gross per 24 hour   Intake              357 ml   Output              400 ml   Net              -43 ml       General:  Awake, alert, NAD. Cardiovascular:  RRR. Pulmonary:  CTA B ant.   GI:  Soft, NT/ND, NABS. Extremities:  No CT or edema. L hip op site dressed, c/d/i.   Additional:      Labs:    Recent Results (from the past 24 hour(s))   OCCULT BLOOD, STOOL    Collection Time: 17 12:00 PM   Result Value Ref Range    Occult blood, stool POSITIVE (A) NEG     CBC W/O DIFF    Collection Time: 17  2:37 AM   Result Value Ref Range    WBC 5.7 4.6 - 13.2 K/uL    RBC 2.44 (L) 4.20 - 5.30 M/uL    HGB 7.5 (L) 12.0 - 16.0 g/dL    HCT 23.3 (L) 35.0 - 45.0 %    MCV 95.5 74.0 - 97.0 FL    MCH 30.7 24.0 - 34.0 PG    MCHC 32.2 31.0 - 37.0 g/dL    RDW 17.8 (H) 11.6 - 14.5 %    PLATELET 797 393 - 357 K/uL    MPV 9.3 9.2 - 11.8 FL       Signed By: Andrew Eugene MD     2017 12:47 PM

## 2017-11-27 NOTE — PROGRESS NOTES
Patient having black tarry stools  Occult Stools positive  H&H from from 9.5, 28.4 to 7.5 , 23.3  Irregular HR: 2nd degree AV block on EKG 11/21  Dr. Burt Driver notified on-call for Hospitalist  N.O D/C Lovenox. Repeat H&H in 6 hrs, Repeat [place] at noon  Consult DR. Saran Loya in regards to EKG

## 2017-11-27 NOTE — PROGRESS NOTES
NUTRITION    Nutrition Consult: Other     RECOMMENDATIONS / PLAN:     - Continue current nutrition interventions  - Continue RD inpatient monitoring and evaluation. NUTRITION INTERVENTIONS & DIAGNOSIS:     [x] Meals/Snacks: modified diet  [x] Medical food supplementation: Ensure Enlive BID    Nutrition Diagnosis: Inadequate oral intake related to decreased appetite and likely confusion as evidenced by pt previously consuming <25% of meal.    ASSESSMENT:     11/27: Pt reported good appetite and meal intake. Tolerating diet. Consuming Ensure supplements. Improving since admission. Pt denied having any concerns at time of visit     11/22: Tolerating diet. Feeding herself breakfast at time of visit. Intake is fair. Per nursing, pt at 25% of breakfast and lunch yesterday, but 85% of dinner and consumed most of th Ensure supplement. If pt okay to get out of bed today, nursing to attempt to get weight on standing scale. Pt dislikes milk, will add food preference  11/21: S/p ORIF after fall at [facility]. Poor intake this morning, consuming between 0-25% with nursing assistance. Question current weight, pt unable to provide. Pt weighed 110 lbs on the bed scale, but believe that is inaccurate.     Average po intake adequate to meet patients estimated nutritional needs:   [x] Yes     [] No   [] Unable to determine at this time    Diet: DIET NUTRITIONAL SUPPLEMENTS Breakfast, Lunch; ENSURE ENLIVE  DIET REGULAR      Food Allergies: NKFA  Current Appetite:   [x] Good     [] Fair     [] Poor     [] Other  Appetite/meal intake prior to admission:   [] Good     [x] Fair     [] Poor     [] Other:  Feeding Limitations:  [] Swallowing difficulty    [] Chewing difficulty    [x] Other: assisted with meals  Current Meal Intake:   Patient Vitals for the past 100 hrs:   % Diet Eaten   11/27/17 0816 100 %   11/24/17 1848 25 %   11/24/17 1242 50 %       BM: 11/27  Skin Integrity: surgical incisions to left hip, knee and thigh  Edema: status   [] Plan of care      Previous Recommendations (for follow-up assessments only):     []   Implemented       []   Not Implemented (RD to address)      [] No Longer Appropriate     [x] No Recommendation Made     Discharge Planning: regular diet  [x] Participated in care planning, discharge planning, & interdisciplinary rounds as appropriate      Bob Bermudez, [address] [place] [address]  Pager: [phone]

## 2017-11-27 NOTE — PROGRESS NOTES
Problem: Mobility Impaired (Adult and Pediatric)  Goal: *Acute Goals and Plan of Care (Insert Text)  Physical Therapy Goals  Initiated 11/20/2017 and to be accomplished within 7 day(s)  Reviewed/updated 11/27/2017  1. Patient will move from supine to sit and sit to supine in bed with contact guard assist.     2.  Patient will transfer from bed to chair and chair to bed with minimal assistance/contact guard assist using the least restrictive device. 3.  Patient will perform sit to stand with minimal assistance/contact guard assist.  4.  Patient will ambulate with minimal assistance/contact guard assist for >50 feet with the least restrictive device. Outcome: Progressing Towards Goal  physical Therapy RE-EVALUATION and TREATMENT    Patient: Jacob Arnold (80 y.o. female)  Date: 11/27/2017  Diagnosis: Subtrochanteric fracture (HCC)  Intertrochanteric fracture of left hip (HCC)  left hip fracture Subtrochanteric fracture (HCC)  Procedure(s) (LRB):  FEMORAL INTERTROCHANTERIC NAIL INSERTION (Left) 8 Days Post-Op  Precautions: Fall, WBAT (LLE)    ASSESSMENT:  Patient presents today supine in bed with HOB elevated, drowsy but agreeable to PT treatment. She transferred to standing with Kate using RW. Upon standing, patient reported immediate need to use restroom, became incontinent of bowels without available BSC. PT assisted patient to restroom while holding guadalupe pad between patient's legs. She required consistent cuing for sequencing and safety of RW use, ModA. Patient transferred to [facility] to complete toileting, required assistance for pericare. Patient then ambulated to locked recliner with ModA for weightshifting to increase RLE step length. Patient left seated in recliner with LE elevated, tray and call bell in reach, instruction not to stand without assistance. Patient's nurse notified with recommendation for chair alarm.   Patient's progression toward goals since last assessment: Continue patient's goals as written. PLAN:  Goals have been updated based on progression since last assessment. Patient continues to benefit from skilled intervention to address the above impairments. Continue to follow the patient 1-2 times per day/4-7 days per week to address goals. Planned Interventions:  [x]     Bed Mobility Training          [x]     Neuromuscular Re-Education  [x]     Transfer Training                []    Orthotic/Prosthetic Training  [x]     Gait Training                       []     Modalities  [x]     Therapeutic Exercises       []     Edema Management/Control  [x]     Therapeutic Activities         [x]     Patient and Family Training/Education  []     Other (comment):  Discharge Recommendations: Skilled Nursing Facility  Further Equipment Recommendations for Discharge: rolling walker     SUBJECTIVE:   Patient stated I need to use the toilet.     OBJECTIVE DATA SUMMARY:   Critical Behavior:  Neurologic State: Alert (disoriented to place)  Orientation Level: Disoriented to place, Disoriented to situation, Oriented to person  Cognition: Follows commands, Impaired decision making  Functional Mobility Training:  Bed Mobility:  Supine to Sit: Minimum assistance  Scooting: Minimum assistance  Transfers:  Sit to Stand: Minimum assistance  Stand to Sit: Minimum assistance  Balance:  Sitting: Impaired  Sitting - Static: Good (unsupported)  Sitting - Dynamic: Fair (occasional)  Standing: Impaired; With support  Standing - Static: Good  Standing - Dynamic : Fair  Ambulation/Gait Training:  Distance (ft): 15 Feet (ft) (X 2 trials)  Assistive Device: Walker, rolling  Ambulation - Level of Assistance:  Moderate assistance  Gait Abnormalities: Antalgic;Decreased step clearance;Shuffling gait  Left Side Weight Bearing: As tolerated  Base of Support: Shift to right  Stance: Right increased  Speed/Xenia: Pace decreased (<100 feet/min)  Step Length: Left shortened;Right shortened  Pain:  Pain Scale 1: Numeric (0 - 10)  Pain Intensity 1: 0  Activity Tolerance:   Fair  Please refer to the flowsheet for vital signs taken during this treatment. After treatment:   [x]  Patient left in no apparent distress sitting up in chair  []  Patient left in no apparent distress in bed  [x]  Call bell left within reach  [x]  Nursing notified Deshaun Connolly)  []  Caregiver present  []  Bed alarm activated    Perlita Hernández   Time Calculation: 24 mins    Mobility [id number] Current  CK= 40-59%   Goal  CJ= 20-39%. The severity rating is based on the Level of Assistance required for Functional Mobility and ADLs.

## 2017-11-27 NOTE — PROGRESS NOTES
Patient Alert  Denies  pain  Denies N/V  Afebrile  Dressing, dry clean and intact: L hip  Pedal pulses intact  Stool residual still black  Voiding  Ambulatory w/2 assist   ICS in place

## 2017-11-27 NOTE — PROGRESS NOTES
Problem: Self Care Deficits Care Plan (Adult)  Goal: *Acute Goals and Plan of Care (Insert Text)  Occupational Therapy Goals  Initiated 11/20/2017 within 7 day(s). 1.  Patient will perform grooming with supervision/set-up   2. Patient will perform upper body dressing with supervision/set-up. 3.  Patient will perform lower body dressing with moderate assistance . 4. Patient will perform BSC transfers with minimal assistance/contact guard assist.  5.  Patient will perform all aspects of toileting with minimal assistance/contact guard assist.  6.  Patient will participate in upper extremity therapeutic exercise/activities with supervision/set-up in preparation for self care tasks   Outcome: Progressing Towards Goal  Occupational Therapy TREATMENT    Patient: Imtiaz Joya (80 y.o. female)  Date: 11/27/2017  Diagnosis: Subtrochanteric fracture (HCC)  Intertrochanteric fracture of left hip (HCC)  left hip fracture Subtrochanteric fracture (HCC)  Procedure(s) (LRB):  FEMORAL INTERTROCHANTERIC NAIL INSERTION (Left) 8 Days Post-Op  Precautions: Fall, WBAT (LLE)  Chart, occupational therapy assessment, plan of care, and goals were reviewed. ASSESSMENT:  Pt OOB seated in chair upon entry, requesting to use bathroom. Pt's LLE weakness and poor standing balance requires Mod/Max Assist w/functional transfers and Max Assist w/toielting hygiene. Increase time and max verbal/tcatile cues for sequencing functional transfer to EOB. Pt performs simple ADL grooming tasks seated EOB and returned to supine w/Mod Assist.   EDUCATION Pt educated on importance of hand placement w/functional transfers for increase safety.   Progression toward goals:  []          Improving appropriately and progressing toward goals  [x]          Improving slowly and progressing toward goals  []          Not making progress toward goals and plan of care will be adjusted     PLAN:  Patient continues to benefit from skilled intervention to address the above impairments. Continue treatment per established plan of care. Discharge Recommendations:  Omid Lala  Further Equipment Recommendations for Discharge:  shower chair and rolling walker     SUBJECTIVE:   Patient stated I live in [place].     OBJECTIVE DATA SUMMARY:       Cognitive/Behavioral Status:  Neurologic State: Alert  Orientation Level: Oriented to person  Cognition: Follows commands, Poor safety awareness     Functional Mobility and Transfers for ADLs:   Bed Mobility:  Supine to Sit: Minimum assistance  Sit to Supine: Moderate assistance (requires assist w/BLE)  Scooting: Minimum assistance   Transfers:  Sit to Stand: Minimum assistance  Bed to Chair: Maximum assistance (w/RW)   Toilet Transfer : Moderate assistance (chair --> BSC w/SPT, BSC --> EOB w/RW)  Balance:  Sitting: Impaired  Sitting - Static: Fair (occasional)  Sitting - Dynamic: Fair (occasional)  Standing: Impaired; With support  Standing - Static: Fair  Standing - Dynamic : Poor  ADL Intervention:  Grooming  Washing Face: Supervision/set-up  Washing Hands: Supervision/set-up  Brushing/Combing Hair: Minimum assistance    Pain:  Pre Treatment:0  Post Treatment:0  Pain Scale 1: Numeric (0 - 10)  Pain Intensity 1: 0    Activity Tolerance:    Fair, 2/2 low H&H (7.5/23. 3)    Please refer to the flowsheet for vital signs taken during this treatment.   After treatment:   []  Patient left in no apparent distress sitting up in chair  [x]  Patient left in no apparent distress in bed  [x]  Call bell left within reach  [x]  Nursing notified  []  Caregiver present  []  Bed alarm activated    GRETCHEN Dial  Time Calculation: 25 mins

## 2017-11-27 NOTE — ROUTINE PROCESS
Bedside and Verbal shift change report given to Michelle Bojorquez RN (oncoming nurse) by Kiana Lopez RN (offgoing nurse). Report included the following information SBAR, Kardex, Intake/Output and MAR.

## 2017-11-28 PROBLEM — Z87.81 STATUS POST-OPERATIVE REPAIR OF CLOSED FRACTURE OF LEFT HIP: Status: ACTIVE | Noted: 2017-01-01

## 2017-11-28 PROBLEM — Z74.09 IMPAIRED MOBILITY AND ADLS: Status: ACTIVE | Noted: 2017-01-01

## 2017-11-28 PROBLEM — R19.5 HEME POSITIVE STOOL: Status: ACTIVE | Noted: 2017-01-01

## 2017-11-28 PROBLEM — G93.41 ACUTE METABOLIC ENCEPHALOPATHY: Status: ACTIVE | Noted: 2017-01-01

## 2017-11-28 PROBLEM — Z98.890 STATUS POST-OPERATIVE REPAIR OF CLOSED FRACTURE OF LEFT HIP: Status: ACTIVE | Noted: 2017-01-01

## 2017-11-28 PROBLEM — I34.0 SEVERE MITRAL REGURGITATION BY PRIOR ECHOCARDIOGRAM: Chronic | Status: ACTIVE | Noted: 2017-01-01

## 2017-11-28 PROBLEM — D62 ACUTE BLOOD LOSS AS CAUSE OF POSTOPERATIVE ANEMIA: Status: ACTIVE | Noted: 2017-01-01

## 2017-11-28 PROBLEM — Z78.9 IMPAIRED MOBILITY AND ADLS: Status: ACTIVE | Noted: 2017-01-01

## 2017-11-28 PROBLEM — Z66 DO NOT RESUSCITATE STATUS: Status: ACTIVE | Noted: 2017-01-01

## 2017-11-28 PROBLEM — I35.0 SEVERE AORTIC STENOSIS BY PRIOR ECHOCARDIOGRAM: Chronic | Status: ACTIVE | Noted: 2017-01-01

## 2017-11-28 PROBLEM — Z51.89 ENCOUNTER FOR BLOOD TRANSFUSION: Status: ACTIVE | Noted: 2017-01-01

## 2017-11-28 NOTE — PROGRESS NOTES
Problem: Self Care Deficits Care Plan (Adult)  Goal: *Acute Goals and Plan of Care (Insert Text)  Occupational Therapy Goals  Initiated 11/20/2017 within 7 day(s). 1.  Patient will perform grooming with supervision/set-up   2. Patient will perform upper body dressing with supervision/set-up. 3.  Patient will perform lower body dressing with moderate assistance . 4. Patient will perform BSC transfers with minimal assistance/contact guard assist.  5.  Patient will perform all aspects of toileting with minimal assistance/contact guard assist.  6.  Patient will participate in upper extremity therapeutic exercise/activities with supervision/set-up in preparation for self care tasks   Outcome: Progressing Towards Goal  Occupational Therapy TREATMENT    Patient: Sofiya Diaz (80 y.o. female)  Date: 11/28/2017  Diagnosis: Subtrochanteric fracture (HCC)  Intertrochanteric fracture of left hip (HCC)  left hip fracture Subtrochanteric fracture (HCC)  Procedure(s) (LRB):  FEMORAL INTERTROCHANTERIC NAIL INSERTION (Left) 9 Days Post-Op  Precautions: Fall, WBAT (LLE)  Chart, occupational therapy assessment, plan of care, and goals were reviewed. ASSESSMENT:  Pt is requesting to use BSC upon entry. Pt was able to maneuver to EOB in preparation for functional txfr w/Min A. Pt required Mod A to maneuver to [facility] w/FWW and VCs for sequencing. Pt required Max A w/toileting hygiene after BM. Pt then transitioned to the chair w/Mod A for safety and advancement of BLE. Pt participated in ADL grooming and UB dressing task seated in the chair, requiring additional time, however, was able to complete w/set-up. Pt's nurse notified that pt is reclined up in the chair, SCDs donned, chair alarm on for pt's safety, and call bell within reach.   Progression toward goals:  [x]          Improving appropriately and progressing toward goals  []          Improving slowly and progressing toward goals  []          Not making progress toward goals and plan of care will be adjusted     PLAN:  Patient continues to benefit from skilled intervention to address the above impairments. Continue treatment per established plan of care. Discharge Recommendations:  Omid Lala  Further Equipment Recommendations for Discharge:  bedside commode and shower chair      G-CODES:     Self Care  Current  CL= 60-79%   Goal  CJ= 20-39%. The severity rating is based on the Level of Assistance required for Functional Mobility and ADLs. SUBJECTIVE:   Patient stated I need to use bathroom.     OBJECTIVE DATA SUMMARY:   Cognitive/Behavioral Status:  Neurologic State: Alert  Orientation Level: Oriented to person, Oriented to place  Cognition: Impaired decision making, Follows commands       Functional Mobility and Transfers for ADLs:   Bed Mobility:     Supine to Sit: Minimum assistance     Scooting: Contact guard assistance; Additional time   Transfers:  Sit to Stand: Contact guard assistance    Toilet Transfer : Moderate assistance; Additional time (w/FWW)    Balance:  Sitting: Impaired  Sitting - Static: Fair (occasional)  Sitting - Dynamic: Fair (occasional)  Standing: Impaired; With support  Standing - Static: Fair  Standing - Dynamic : Poor    ADL Intervention:   Grooming  Grooming Assistance: Supervision/set up (seated in the chair)  Washing Face: Supervision/set-up  Washing Hands: Supervision/set-up  Brushing/Combing Hair: Supervision/set-up  Upper Body [address]: Supervision/ set-up (seated in the chair)    Toileting  Bladder Hygiene: Contact guard assistance  Bowel Hygiene: Maximum assistance  Clothing Management: Minimum assistance  Pain:  Pt reports 0/10 pain or discomfort prior to treatment.    Pt reports 0/10 pain or discomfort post treatment. Activity Tolerance:    Fair    Please refer to the flowsheet for vital signs taken during this treatment.   After treatment:   [x]  Patient left in no apparent distress sitting up in chair  []  Patient left in no apparent distress in bed  [x]  Call bell left within reach  [x]  Nursing notified  []  Caregiver present  [x]  Chair alarm activated    [place] GRETCHEN/L  Time Calculation: 39 mins

## 2017-11-28 NOTE — PROGRESS NOTES
conducted a Follow up consultation and Spiritual Assessment for EULALIA [facility], who is a 80 y.o.,female. The  provided the following Interventions:  Continued the relationship of care and support. Patient is somewhat confused. Listened empathically to patient. Offered [employer], prayer, and assurance of continued prayer for patient. Chart reviewed. Later,  spoke with patient's daughter Payton Rossi and offered spiritual support. The following outcomes were achieved:  Patient expressed gratitude for pastoral care visit. Assessment:  There are no further spiritual or Moravian issues which require Spiritual Care Services interventions at this time. Plan:  Chaplains will continue to follow and provide pastoral care as needed or requested. The Rev.  [address]  [facility] [phone] / [facility] [phone]

## 2017-11-28 NOTE — PROGRESS NOTES
Bedside shift change report given to Armando Yusuf RN (oncoming nurse) by Andrez Springer RN (offgoing nurse). Report included the following information SBAR, Kardex, Intake/Output, MAR, Recent Results and Med Rec Status.

## 2017-11-28 NOTE — PROGRESS NOTES
[facility] Hospitalist Group  Progress Note    Patient: Shadi Early Age: 80 y.o. : 4/3/1925 MR#: [id number] SSN: [id number]  Date: 2017     Subjective:     Sleeping in chair. Rouses to verbal stim. Denies pain, F/C, N/V, CP or SOB. Case d/w case mgmt, aim for xfer to SNF tomorrow. Assessment/Plan:   1. L hip fx s/p repair - ORIF on . Continue post-op care. Lovenox held for bleeding, now on heparin for DVT proph. Total of 3u PRBC xfused with another 2u PRBC today. PT/OT/Dispo planning. Heme+ stool, no apparent active bleeding earlier, but have d/c'd Lovenox due to drop in H/H and heme+ stool. Had some oozing from op site post-op, so Lovenox was held then. Discussed with pt's daughter yesterday, would like to avoid further invasive procedures. See below. 2. Acute post-op blood loss anemia - hemodynamically stable. As per #1. Have d/c'd Lovenox. Give 2u PRBC. H/H stable, will continue to follow. See #6.   3. AStenosis - by hx. No acute issues. Last echo with EF 60%, no obvious wall motion abnml, AValve mean gradient 36mmHg, estimated valve area 0.75cm^2. Seen by cardiology 2017, no indication for valve replacement. Outpt f/u.  4. Urinary retention - resolved. 5. Acute metabolic encephalopathy - transient, supportive care. Awake, pleasant and conversant to me on multiple days. Some intermittent confusion. Supportive care. 6. Heme+ stool - as above, off Lovenox. No invasive w/u per daughter. H/H stable. 7. Fall precautions, PT/OT/Dispo. If H/H stable, aim for xfer tomorrow. Spoke with daughter at length, reviewed plan of care.      Additional Notes:      Case discussed with:  [x]Patient  [x]Family  [x]Nursing  [x]Case Management  DVT Prophylaxis:  []Lovenox  []Hep SQ  []SCDs  []Coumadin   []On Heparin gtt    Objective:   VS:   Visit Vitals    /67    Pulse 79    Temp 97.9 °F (36.6 °C)    Resp 18    Ht 5' 1\" (1.549 m)    Wt 44 kg (97 lb)    SpO2 93%    BMI 18.94 kg/m2      Tmax/24hrs: Temp (24hrs), Av.5 °F (36.4 °C), Min:96.7 °F (35.9 °C), Max:98.2 °F (36.8 °C)      Intake/Output Summary (Last 24 hours) at 17 1629  Last data filed at 17 1935   Gross per 24 hour   Intake            670.4 ml   Output              200 ml   Net            470.4 ml       General:  Sleeping in chair, rouses to verbal stim. Alert. NAD. Cardiovascular:  RRR. Pulmonary:  CTA B ant.   GI:  Soft, NT/ND, NABS. Extremities:  No CT or edema. L hip op site dressed, c/d/i. Additional:      Labs:    Recent Results (from the past 24 hour(s))   HGB & HCT    Collection Time: 17  4:55 PM   Result Value Ref Range    HGB 11.6 (L) 12.0 - 16.0 g/dL    HCT 34.8 (L) 35.0 - 45.0 %   CBC WITH AUTOMATED DIFF    Collection Time: 17  2:22 AM   Result Value Ref Range    WBC 7.0 4.6 - 13.2 K/uL    RBC 4.09 (L) 4.20 - 5.30 M/uL    HGB 12.5 12.0 - 16.0 g/dL    HCT 37.7 35.0 - 45.0 %    MCV 92.2 74.0 - 97.0 FL    MCH 30.6 24.0 - 34.0 PG    MCHC 33.2 31.0 - 37.0 g/dL    RDW 17.6 (H) 11.6 - 14.5 %    PLATELET 394 019 - 158 K/uL    MPV 9.8 9.2 - 11.8 FL    NEUTROPHILS 71 40 - 73 %    LYMPHOCYTES 16 (L) 21 - 52 %    MONOCYTES 11 (H) 3 - 10 %    EOSINOPHILS 2 0 - 5 %    BASOPHILS 0 0 - 2 %    ABS. NEUTROPHILS 4.9 1.8 - 8.0 K/UL    ABS. LYMPHOCYTES 1.1 0.9 - 3.6 K/UL    ABS. MONOCYTES 0.8 0.05 - 1.2 K/UL    ABS. EOSINOPHILS 0.2 0.0 - 0.4 K/UL    ABS.  BASOPHILS 0.0 0.0 - 0.1 K/UL    DF AUTOMATED     METABOLIC PANEL, BASIC    Collection Time: 17  2:22 AM   Result Value Ref Range    Sodium 138 136 - 145 mmol/L    Potassium 4.1 3.5 - 5.5 mmol/L    Chloride 107 100 - 108 mmol/L    CO2 23 21 - 32 mmol/L    Anion gap 8 3.0 - 18 mmol/L    Glucose 94 74 - 99 mg/dL    BUN 37 (H) 7.0 - 18 MG/DL    Creatinine 0.55 (L) 0.6 - 1.3 MG/DL    BUN/Creatinine ratio 67 (H) 12 - 20      GFR est AA >60 >60 ml/min/1.73m2    GFR est non-AA >60 >60 ml/min/1.73m2    Calcium 8.2 (L) 8.5 - 10.1 MG/DL   HGB & HCT Collection Time: 11/28/17  2:22 PM   Result Value Ref Range    HGB 13.7 12.0 - 16.0 g/dL    HCT 40.9 35.0 - 45.0 %       Signed By: Charlotte Reilly MD     November 28, 2017 12:47 PM

## 2017-11-28 NOTE — ROUTINE PROCESS
Mobility Intervention:       [] Pt dangled at edge of bed    [x] Pt assisted OOB to bedside commode    [] Pt assisted OOB to chair    [] Pt ambulated to bathroom    [] Patient was ambulated in room/hallway    Assistive Device Utilized:       [] Rolling walker   [] Crutches   [] Straight Cane   [] Knee immobilizer   [] IV pole    After Mobilization:     [] Patient left in no apparent distress sitting up in chair  [x] Patient left in no apparent distress in bed  [] Call bell left within reach  [] SCDs on & machine turned on  [] Ice applied  [] RN notified  [] Caregiver present  [] Bed alarm activated    Reason patient not mobilized:      [] Patient refused   [] Nausea/vomiting   [] Low blood pressure   [] Drowsy/lethargic    Pain Rating:     [] 0  [] 1  Assistive Device:        [] 2  [] 3  [] 4  [] 5  [] 6  Assistive Device:        [] 7  [] 8  [] 9  [] 10    Comments:

## 2017-11-28 NOTE — PROGRESS NOTES
Problem: Mobility Impaired (Adult and Pediatric)  Goal: *Acute Goals and Plan of Care (Insert Text)  Physical Therapy Goals  Initiated 11/20/2017 and to be accomplished within 7 day(s)  Reviewed/updated 11/27/2017  1. Patient will move from supine to sit and sit to supine in bed with contact guard assist.     2.  Patient will transfer from bed to chair and chair to bed with minimal assistance/contact guard assist using the least restrictive device. 3.  Patient will perform sit to stand with minimal assistance/contact guard assist.  4.  Patient will ambulate with minimal assistance/contact guard assist for >50 feet with the least restrictive device. physical Therapy TREATMENT    Patient: Katie Barlow (80 y.o. female)  Date: 11/28/2017  Diagnosis: Subtrochanteric fracture (HCC)  Intertrochanteric fracture of left hip (HCC)  left hip fracture Subtrochanteric fracture (HCC)  Procedure(s) (LRB):  FEMORAL INTERTROCHANTERIC NAIL INSERTION (Left) 9 Days Post-Op  Precautions: Fall, WBAT (LLE)  Chart, physical therapy assessment, plan of care and goals were reviewed. ASSESSMENT:  Pt presents sitting in recliner and seems very confused and has increased difficulty answering questions or making sense with answers. Pt performed sit to stand from recliner to [address] [place] [address] with Anand and immediately became incontinent of bowel. Therapist required nurse to assist, to provide total A for hygiene while therapist provided modA to maintain pt's standing balance. Pt stood x2 trials, ~5min and ~8min, with RW and modA for balance due to pt's posterior lean. Pt required v/c and hand over hand placement for safe stand to sit. Therapy time limited due to pt requiring extensive hygiene.    Progression toward goals:  []      Improving appropriately and progressing toward goals  [x]      Improving slowly and progressing toward goals  []      Not making progress toward goals and plan of care will be adjusted     PLAN:  Patient continues to benefit from skilled intervention to address the above impairments. Continue treatment per established plan of care. Discharge Recommendations:  O[facility] Lala  Further Equipment Recommendations for Discharge:  rolling walker and N/A     SUBJECTIVE:   Patient stated Did you use the turn signal?    OBJECTIVE DATA SUMMARY:   Critical Behavior:  Neurologic State: Alert   Orientation Level: Oriented to person   Cognition: Impaired decision making, Follows commands     Functional Mobility Training:  Bed Mobility:    Transfers:  Sit to Stand: Minimum assistance  Stand to Sit: Minimum assistance      Balance:  Sitting: Impaired  Sitting - Static: Fair (occasional)  Sitting - Dynamic: Fair (occasional)  Standing: Impaired; With support  Standing - Static: Poor  Standing - Dynamic : Poor  Ambulation/Gait Training:   Left Side Weight Bearing: As tolerated   Stairs:   NT        Neuro Re-Education:  NT  Therapeutic Exercises:   NT  Pain:  No c/o   Activity Tolerance:   fair  Please refer to the flowsheet for vital signs taken during this treatment.   After treatment:   [x] Patient left in no apparent distress sitting up in chair  [] Patient left in no apparent distress in bed  [x] Call bell left within reach  [x] Nursing notified  [] Caregiver present  [x] Bed alarm activated      Quyen Snell PTA   Time Calculation: 25 mins

## 2017-11-28 NOTE — ROUTINE PROCESS
Bedside and Verbal shift change report given to Meera Valenzuela (oncoming nurse) by Merari Sanchez RN (offgoing nurse). Report included the following information SBAR, Kardex, MAR and Recent Results.     SITUATION:    Code Status: DNR   Reason for Admission: [facility] ([facility]   Intertrochanteric fracture of left hip ([facility]   left hip fracture    [address] [facility] day: 9   Problem List:       Hospital Problems  Date Reviewed: 11/19/2017          Codes Class Noted POA    * (Principal)Subtrochanteric fracture ([facility] ICD-10-CM: L62.45MX  ICD-9-CM: 820.22  11/19/2017 Yes        Intertrochanteric fracture of left hip ([facility] ICD-10-CM: T55.548Y  ICD-9-CM: 820.21  11/19/2017 Unknown        Mitral regurgitation and aortic stenosis ICD-10-CM: I08.0  ICD-9-CM: 396.2  8/1/2017 Yes              BACKGROUND:    Past Medical History:   Past Medical History:   Diagnosis Date    Ill-defined condition     synope         Patient taking anticoagulants no     ASSESSMENT:    Changes in Assessment Throughout Shift: no     Patient has Central Line: no Reasons if yes: no   Patient has Reed Cath: no Reasons if yes: no      Last Vitals:     Vitals:    11/27/17 1911 11/27/17 2005 11/27/17 2323 11/28/17 0451   BP:  164/79 143/61 161/79   Pulse: 69  85 73   Resp: 24  20 18   Temp: 96.7 °F (35.9 °C)  97.1 °F (36.2 °C) 97 °F (36.1 °C)   SpO2: 91%  90% 91%   Weight:       Height:            IV and DRAINS (will only show if present)   [REMOVED] Peripheral IV 11/19/17 Right Wrist-Site Assessment: Clean, dry, & intact  [REMOVED] Peripheral IV 11/20/17 Right Forearm-Site Assessment: Clean, dry, & intact  [REMOVED] Peripheral IV 11/21/17 Left Forearm-Site Assessment: Clean, dry, & intact  [REMOVED] Peripheral IV 11/19/17 Right Forearm-Site Assessment: Clean, dry, & intact  [REMOVED] Peripheral IV 11/24/17 Left Wrist-Site Assessment: Clean, dry, & intact  Peripheral IV 11/25/17 Right Arm-Site Assessment: Clean, dry, & intact     WOUND (if present)   Wound Type:  none   Dressing present Dressing Present : No   Wound Concerns/Notes:  none     PAIN    Pain Assessment    Pain Intensity 1: 0 (11/27/17 1314)    Pain Location 1: Hip    Pain Intervention(s) 1: Medication (see MAR)    Patient Stated Pain Goal: 0  o Interventions for Pain:  none  o Intervention effective: no  o Time of last intervention: Refused   o Reassessment Completed: no      Last 3 Weights:  Last 3 Recorded Weights in this Encounter    11/19/17 0941   Weight: 44 kg (97 lb)     Weight change:      INTAKE/OUPUT    Current Shift: 11/27 1901 - 11/28 0700  In: 345.4   Out: -     Last three shifts: 11/26 0701 - 11/27 1900  In: 682 [P. O.:682]  Out: 600 [Urine:600]     LAB RESULTS     Recent Labs      11/28/17   0222  11/27/17   1655  11/27/17   0237  11/26/17   0100   WBC  7.0   --   5.7  8.2   HGB  12.5  11.6*  7.5*  9.5*   HCT  37.7  34.8*  23.3*  28.4*   PLT  156   --   162  150        Recent Labs      11/28/17   0222  11/26/17   0100   NA  138  139   K  4.1  4.1   GLU  94  90   BUN  37*  63*   CREA  0.55*  0.63   CA  8.2*  7.7*       RECOMMENDATIONS AND DISCHARGE PLANNING     1. Pending tests/procedures/ Plan of Care or Other Needs: PT/OT     2. Discharge plan for patient and Needs/Barriers: SNF    3. Estimated Discharge Date: 11/29/17 Posted on Whiteboard in Patients Room: no      4. The patient's care plan was reviewed with the oncoming nurse. \"HEALS\" SAFETY CHECK      Fall Risk    Total Score: 4    Safety Measures: Safety Measures: Bed/Chair-Wheels locked, Bed in low position    A safety check occurred in the patient's room between off going nurse and oncoming nurse listed above.     The safety check included the below items  Area Items   H  High Alert Medications - Verify all high alert medication drips (heparin, PCA, etc.)   E  Equipment - Suction is set up for ALL patients (with yanker)  - Red plugs utilized for all equipment (IV pumps, etc.)  - WOWs wiped down at end of shift.  - Room stocked with oxygen, suction, and other unit-specific supplies   A  Alarms - Bed alarm is set for fall risk patients  - Ensure chair alarm is in place and activated if patient is up in a chair   L  Lines - Check IV for any infiltration  - Reed bag is empty if patient has a Reed   - Tubing and IV bags are labeled   S  Safety   - Room is clean, patient is clean, and equipment is clean. - Hallways are clear from equipment besides carts. - Fall bracelet on for fall risk patients  - Ensure room is clear and free of clutter  - Suction is set up for ALL patients (with yanker)  - Hallways are clear from equipment besides carts.    - Isolation precautions followed, supplies available outside room, sign posted     Honorio Woods RN

## 2017-11-28 NOTE — PROGRESS NOTES
Third unit of blood completed for this shift. H&H at 1655 11.6 & 34.8. Paged [phone] to notify physician.

## 2017-11-29 NOTE — PROGRESS NOTES
Problem: Falls - Risk of  Goal: *Absence of Falls  Document Simran Fall Risk and appropriate interventions in the flowsheet.    Outcome: Progressing Towards Goal  Fall Risk Interventions:  Mobility Interventions: Assess mobility with egress test, Communicate number of staff needed for ambulation/transfer, OT consult for ADLs, Patient to call before getting OOB, PT Consult for mobility concerns, PT Consult for assist device competence, Strengthening exercises (ROM-active/passive)    Mentation Interventions: Adequate sleep, hydration, pain control, Bed/chair exit alarm, Door open when patient unattended, Eyeglasses and hearing aids, Familiar objects from home, Family/sitter at bedside, More frequent rounding, Reorient patient    Medication Interventions: Assess postural VS orthostatic hypotension, Evaluate medications/consider consulting pharmacy, Patient to call before getting OOB, Teach patient to arise slowly    Elimination Interventions: Bed/chair exit alarm, Call light in reach, Toileting schedule/hourly rounds    History of Falls Interventions: Bed/chair exit alarm, Door open when patient unattended, Room close to nurse's station

## 2017-11-29 NOTE — PROGRESS NOTES
Problem: Self Care Deficits Care Plan (Adult)  Goal: *Acute Goals and Plan of Care (Insert Text)  Occupational Therapy Goals  Initiated 11/20/2017 within 7 day(s). 1.  Patient will perform grooming with supervision/set-up   2. Patient will perform upper body dressing with supervision/set-up. 3.  Patient will perform lower body dressing with moderate assistance . 4. Patient will perform BSC transfers with minimal assistance/contact guard assist.  5.  Patient will perform all aspects of toileting with minimal assistance/contact guard assist.  6.  Patient will participate in upper extremity therapeutic exercise/activities with supervision/set-up in preparation for self care tasks   Occupational Therapy TREATMENT    Patient: Candice Wang (80 y.o. female)  Date: 11/29/2017  Diagnosis: Subtrochanteric fracture (HCC)  Intertrochanteric fracture of left hip (HCC)  left hip fracture Closed displaced intertrochanteric fracture of left femur (HCC)  Procedure(s) (LRB):  FEMORAL INTERTROCHANTERIC NAIL INSERTION (Left) 10 Days Post-Op  Precautions: Fall, WBAT (LLE)  Chart, occupational therapy assessment, plan of care, and goals were reviewed. ASSESSMENT:  Pt presented supine in bed agreeable to skilled OT services this date. Supportive family member was present. Pt maneuvered supine <> EOB Min A and extra time. Pt performed oral hygiene seated EOB Supervision/Setup. Pt required extra time to complete ADL task. Pt then performed hair care, combing hair after daughter wetted pt hair. Pt was left comfortable in bed with call bell within reach and bed alarm activated. Progression toward goals:  []          Improving appropriately and progressing toward goals  [x]          Improving slowly and progressing toward goals  []          Not making progress toward goals and plan of care will be adjusted     PLAN:  Patient continues to benefit from skilled intervention to address the above impairments.   Continue treatment per established plan of care. Discharge Recommendations:  O[facility] Lala  Further Equipment Recommendations for Discharge:  bedside commode and shower chair      G-CODES:     Self Care  Current  CL= 60-79%. The severity rating is based on the Level of Assistance required for Functional Mobility and ADLs. SUBJECTIVE:   Patient stated I am 80years old.     OBJECTIVE DATA SUMMARY:   Cognitive/Behavioral Status:  Neurologic State: Drowsy  Orientation Level: Oriented to person  Cognition: Follows commands       Functional Mobility and Transfers for ADLs:   Bed Mobility:     Supine to Sit: Minimum assistance  Sit to Supine: Minimum assistance  Scooting: Contact guard assistance   Transfers:  Sit to Stand: Moderate assistance    Balance:  Sitting: Impaired  Sitting - Static: Fair (occasional)  Sitting - Dynamic: Fair (occasional)  Standing: Impaired; With support (RW)  Standing - Static: Poor  Standing - Dynamic : Poor    ADL Intervention:    Grooming  Brushing Teeth: Supervision/set-up (performed seated EOB)  Brushing/Combing Hair: Supervision/set-up      Pain:  Pt reports 0/10 pain or discomfort prior to treatment.    Pt reports 0/10 pain or discomfort post treatment. Activity Tolerance:    Fair    Please refer to the flowsheet for vital signs taken during this treatment.   After treatment:   []  Patient left in no apparent distress sitting up in chair  [x]  Patient left in no apparent distress in bed  [x]  Call bell left within reach  [x]  Nursing notified  [x]  Caregiver present (daughter)  [x]  Bed alarm activated    Melissa Goltz [place], COTA/L  Time Calculation: 25 mins

## 2017-11-29 NOTE — PROGRESS NOTES
Verbal shift change report given to Trung Núñez (oncoming nurse) by Wade Branham RN (offgoing nurse). Report included the following information SBAR, Kardex, Intake/Output, MAR, Recent Results and Med Rec Status.

## 2017-11-29 NOTE — PROGRESS NOTES
Problem: Mobility Impaired (Adult and Pediatric)  Goal: *Acute Goals and Plan of Care (Insert Text)  Physical Therapy Goals  Initiated 11/20/2017 and to be accomplished within 7 day(s)  Reviewed/updated 11/27/2017  1. Patient will move from supine to sit and sit to supine in bed with contact guard assist.     2.  Patient will transfer from bed to chair and chair to bed with minimal assistance/contact guard assist using the least restrictive device. 3.  Patient will perform sit to stand with minimal assistance/contact guard assist.  4.  Patient will ambulate with minimal assistance/contact guard assist for >50 feet with the least restrictive device. Outcome: Progressing Towards Goal  physical Therapy TREATMENT    Patient: Shadi Early (80 y.o. female)  Date: 11/29/2017  Diagnosis: Subtrochanteric fracture (HCC)  Intertrochanteric fracture of left hip (HCC)  left hip fracture Closed displaced intertrochanteric fracture of left femur (HCC)  Procedure(s) (LRB):  FEMORAL INTERTROCHANTERIC NAIL INSERTION (Left) 10 Days Post-Op  Precautions: Fall, WBAT (LLE)   Chart, physical therapy assessment, plan of care and goals were reviewed. ASSESSMENT:  Patient presents today supine in bed with HOB elevated, daughter in room, alert and agreeable to PT treatment. She transferred to sitting EOB with Kate. With VCs and ModA, patient assisted to standing with RW, immediately became incontinent of bowels. Patient's daughter retrieved bedside commode and PT assisted patient with SPT to [facility] with MaxA. Patient limited today secondary to reports of increased L hip pain. Upon completion of toileting, PT provided MaxA for standing while nurse assisted with pericare. Patient then ambulated back to bed with MaxA, assisted back to supine and left with HOB elevated and call bell in reach.   Progression toward goals:  []      Improving appropriately and progressing toward goals  [x]      Improving slowly and progressing toward goals  [] Not making progress toward goals and plan of care will be adjusted     PLAN:  Patient continues to benefit from skilled intervention to address the above impairments. Continue treatment per established plan of care. Discharge Recommendations:  O[facility] Lala  Further Equipment Recommendations for Discharge:  rolling walker     SUBJECTIVE:   Patient stated Oh, that's painful, while completing transfer to [facility]. OBJECTIVE DATA SUMMARY:   Critical Behavior:  Neurologic State: Confused  Orientation Level: Oriented to person  Cognition: Follows commands  Functional Mobility Training:  Bed Mobility:  Supine to Sit: Minimum assistance  Sit to Supine: Minimum assistance  Scooting: Contact guard assistance; Additional time  Transfers:  Sit to Stand: Moderate assistance  Stand to Sit: Moderate assistance  Balance:  Sitting: Impaired  Sitting - Static: Fair (occasional)  Sitting - Dynamic: Fair (occasional)  Standing: Impaired; With support (RW)  Standing - Static: Poor  Standing - Dynamic : Poor  Ambulation/Gait Training:  Distance (ft): 4 Feet (ft) (X 2 trials)  Assistive Device: Walker, rolling  Ambulation - Level of Assistance: Maximum assistance  Gait Abnormalities: Antalgic;Decreased step clearance;Trunk sway increased; Shuffling gait  Left Side Weight Bearing: As tolerated  Base of Support: Shift to right  Stance: Left increased;Right increased  Speed/Xenia: Slow;Shuffled  Step Length: Left shortened;Right shortened  Therapeutic Exercises:   Patient and daughter trained in supine ankle pumps, heel slides, quad sets. Instructed to complete 3X10 per day within pain-free ROM. Pain:  Pain Scale 1: Numeric (0 - 10)  Pain Intensity 1: 0  Activity Tolerance:   Fair  Please refer to the flowsheet for vital signs taken during this treatment.   After treatment:   [] Patient left in no apparent distress sitting up in chair  [x] Patient left in no apparent distress in bed  [x] Call bell left within reach  [x] Nursing notified Cisco Newton)  [x] Caregiver present  [x] Bed alarm activated      Manuel Faust   Time Calculation: 31 mins    Mobility [id number] Current  CM= 80-99%   Goal  CJ= 20-39%. The severity rating is based on the Level of Assistance required for Functional Mobility and ADLs.

## 2017-11-29 NOTE — ROUTINE PROCESS
Pt is very confused today. Bed alarm is on. Pt keeps on taking off arm bracelets. Pt is close to the nurses station.

## 2017-11-29 NOTE — PROGRESS NOTES
Verbal shift change report given to JM Perez (oncoming nurse) by Inez De Paz RN (offgoing nurse). Report included the following information SBAR, Kardex, Intake/Output, MAR, Recent Results and Med Rec Status.

## 2017-11-29 NOTE — PROGRESS NOTES
Care Management Interventions  Mode of Transport at Discharge: BLS ([facility])  [facility] Transport Time of Discharge: [address] (CM Consult): SNF  Partner SNF: No  Reason Why Partner SNF Not Chosen:  ([facility] )  Physical Therapy Consult: Yes  Occupational Therapy Consult: Yes  Plan discussed with Pt/Family/Caregiver: Yes  Freedom of Choice Offered: Yes  Discharge Location  Discharge Placement: Skilled nursing facility     Pt scheduled for transport via [address] at [phone] today to [facility] in [facility]. Pt's daughter, René Quinones, made aware of plan. Puma Prather to provide POA papers to SNF. Shira Birmingham with [facility] made aware.

## 2017-11-29 NOTE — ROUTINE PROCESS
2000 Patient is alert but only oriented to her self and family. She is pleasantly confused. She has bruises on her face and forehead and hip from her fall. Her dressing is clean dry and intact and pulses palpable. Answered her daughters questions regarding her mothers care daughter verbalized understanding. 0000 Patient is alert and oriented to self only no signs or symptoms of distress. Dressing is clean dry and intact and pulses palpable  0145 bathed patient  0400 Patient is alert and oriented to self with no signs or symptoms of distress. Dressing is intact anddry and pulses ar epalpable.

## 2017-11-29 NOTE — DISCHARGE SUMMARY
[facility]  Discharge Summary       Patient: Milton Hand Age: 80 y.o. : 4/3/1925 MR#: [id number] [id number]: [id number]  PCP on record: Marilyn Gil MD  Admit date: 2017  Discharge date: 2017    Consults:    Dr. Jimmy Palacios, orthopedics. Procedures:  : ORIF L displaced 4-part intertrochanteric femure fx with IM nail. Significant Diagnostic Studies:    L hip x-ray:  IMPRESSION:  1. Complex comminuted impacted intertrochanteric fracture of the left proximal femur without dislocation with associated soft tissue swelling and extensive vascular calcification of the proximal left thigh.  L knee x-ray:   IMPRESSION:  1. No acute fracture or dislocation. 2. Significant tricompartmental osteoarthritis most pronounced medially with extensive SFA and popliteal vascular calcifications.  CT head:   IMPRESSION:  1. No acute intracranial hemorrhage, mass effect or midline structural shift. 2. Moderate left supraorbital/frontal soft tissue swelling without associated osseous deformity. Discharge Diagnoses:                                           1. L hip fx s/p repair   2. Acute post-op blood loss anemia  3. AStenosis  4. Urinary retention  5. Acute metabolic encephalopathy   6. Heme+ stool    Hospital Course by Problem   1. Please refer to admission H&P. Briefly, pt presented after having fallen PTA, resulting in L hip and face pain. Plain films demonstrated L femur fx. Pt underwent surgery, and course by problem is as follows below. 2. L hip fx s/p repair - ORIF on . Has been recovering well overall, with good pain control. Post-op, pt had some oozing at operative site, resulting in hold of Lovenox for DVT prophylaxis. Cautiously started on heparin for DVT prophylaxis, then transitioned to Lovenox. Subsequently had some loose, at times water, heme+ stool.  Discussed this with pt's daughter, as well as paths of care, and she did not want to pursue intervention. Lovenox was stopped. Pt given a total of 3 units of PRBC. H/H has been stable. Would continue SCDs for mechanical DVT prophylaxis during recovering phase at SNF. 3. Acute post-op blood loss anemia - hemodynamically stable. As per #1. Have d/c'd Lovenox. H/H stable, will continue to follow. 4. AStenosis - by hx. No acute issues. Last echo with EF 60%, no obvious wall motion abnml, AValve mean gradient 36mmHg, estimated valve area 0.75cm^2. Seen by cardiology 2017, no indication for valve replacement. Outpt f/u.  5. Urinary retention - resolved. Transient post-op, no new issues otherwise. 6. Acute metabolic encephalopathy - transient, supportive care. Awake, pleasant and conversant to me on multiple days. Some intermittent confusion. Supportive care. Daughter reports pt's  passed away a couple of months ago. Hopefully this will improve with time and consistency in environment. 7. Heme+ stool - as above, off Lovenox. No invasive w/u per daughter. H/H stable. 8. Maintain fall precautions. Recommend DVT prophylaxis with SCDs as she cannot tolerate Lovenox or heparin. Today's examination of the patient revealed:     Subjective:     Sleeping. Rouses to verbal stim. Denies pain, F/C, N/V, CP or SOB. No hip pain. Objective:   VS:   Visit Vitals    /78 (BP 1 Location: Left arm, BP Patient Position: At rest)    Pulse 96    Temp 96.1 °F (35.6 °C)    Resp 16    Ht 5' 1\" (1.549 m)    Wt 44 kg (97 lb)    SpO2 94%    Breastfeeding No    BMI 18.94 kg/m2      Tmax/24hrs: Temp (24hrs), Av.7 °F (35.9 °C), Min:96.1 °F (35.6 °C), Max:97.1 °F (36.2 °C)     Input/Output:   Intake/Output Summary (Last 24 hours) at 17 1531  Last data filed at 17 0409   Gross per 24 hour   Intake             5093 ml   Output                0 ml   Net             5093 ml       General:  Sleeping, rouses to voice. Alert. NAD. Cardiovascular:  RRR.   Pulmonary:  CTA B.  GI:  Soft, NT/ND, NABS. Extremities:  No CT or edema. L hip with some bruising, operative site dressed, c/d/i. Additional:      Labs:    Recent Results (from the past 24 hour(s))   CBC WITH AUTOMATED DIFF    Collection Time: 11/29/17 12:47 AM   Result Value Ref Range    WBC 7.7 4.6 - 13.2 K/uL    RBC 4.02 (L) 4.20 - 5.30 M/uL    HGB 12.4 12.0 - 16.0 g/dL    HCT 37.7 35.0 - 45.0 %    MCV 93.8 74.0 - 97.0 FL    MCH 30.8 24.0 - 34.0 PG    MCHC 32.9 31.0 - 37.0 g/dL    RDW 17.9 (H) 11.6 - 14.5 %    PLATELET 593 722 - 842 K/uL    MPV 9.5 9.2 - 11.8 FL    NEUTROPHILS 70 42 - 75 %    BAND NEUTROPHILS 2 0 - 5 %    LYMPHOCYTES 15 (L) 20 - 51 %    MONOCYTES 12 (H) 2 - 9 %    EOSINOPHILS 1 0 - 5 %    BASOPHILS 0 0 - 3 %    ABS. NEUTROPHILS 5.5 1.8 - 8.0 K/UL    ABS. LYMPHOCYTES 1.2 0.8 - 3.5 K/UL    ABS. MONOCYTES 0.9 0 - 1.0 K/UL    ABS. EOSINOPHILS 0.1 0.0 - 0.4 K/UL    ABS. BASOPHILS 0.0 0.0 - 0.06 K/UL    DF AUTOMATED      PLATELET COMMENTS ADEQUATE PLATELETS      RBC COMMENTS NORMOCYTIC, NORMOCHROMIC       Additional Data Reviewed:     Condition:   Disposition:    []Home   []Home with Home Health   [x]SNF/NH   []Rehab   []Home with family   []Alternate Facility:____________________      Discharge Medications:     Current Discharge Medication List      START taking these medications    Details   bisacodyl (DULCOLAX) 5 mg EC tablet Take 2 Tabs by mouth daily as needed for Constipation. Qty: 30 Tab, Refills: 0      HYDROcodone-acetaminophen (NORCO) 5-325 mg per tablet Take 1 Tab by mouth every four (4) hours as needed. Max Daily Amount: 6 Tabs. For pain unrelieved by Tylenol. Qty: 20 Tab, Refills: 0         CONTINUE these medications which have CHANGED    Details   acetaminophen (TYLENOL) 325 mg tablet Take 2 Tabs by mouth every six (6) hours as needed for Pain or Fever.  Indications: Pain  Qty: 30 Tab, Refills: 0         STOP taking these medications       acetaminophen-codeine (TYLENOL-CODEINE #3) 300-30 mg per tablet Comments: Reason for Stopping:         cyclobenzaprine (FLEXERIL) 5 mg tablet Comments:   Reason for Stopping: Follow-up Appointments:   1. Your PCP: Siena Thompson MD, within 7-10days  2. With Dr. Mayte Ulloa, orthopedics, in 2-3 weeks. 3. With cardiology as scheduled.      >30 minutes spent coordinating this discharge (review instructions/follow-up, prescriptions, preparing report for sign off)    Signed:  Trinity Charles MD  11/29/2017  3:31 PM

## 2017-11-29 NOTE — DISCHARGE SUMMARY
[facility]  TRANSFER SUMMARY    Name:  Scott Henderson  MR#:  [id number]  :  1925  Account #:  [de-identified]  Date of Adm:  2017  Date of Transfer:      ADDENDUM    Repeat H and H this afternoon is 13.3/40.7, stable. No other acute  issues.         Alvarado Blackwell MD    PP / Kelli Barrera  D:  2017   16:29  T:  2017   16:44  Job #:  [id number]

## 2017-11-29 NOTE — DISCHARGE INSTRUCTIONS
Patient armband removed and shredded      DISCHARGE SUMMARY from Nurse    PATIENT INSTRUCTIONS:    After general anesthesia or intravenous sedation, for 24 hours or while taking prescription Narcotics:  · Limit your activities  · Do not drive and operate hazardous machinery  · Do not make important personal or business decisions  · Do  not drink alcoholic beverages  · If you have not urinated within 8 hours after discharge, please contact your surgeon on call. Report the following to your surgeon:  · Excessive pain, swelling, redness or odor of or around the surgical area  · Temperature over 100.5  · Nausea and vomiting lasting longer than 4 hours or if unable to take medications  · Any signs of decreased circulation or nerve impairment to extremity: change in color, persistent  numbness, tingling, coldness or increase pain  · Any questions    What to do at Home:    If you experience any of the following symptoms Nausea, vomiting, diarrhea, fever greater than 100.5, dizziness, severe headache, shortness of breath, chest pain, increased pain, please follow up with PCP. *  Please give a list of your current medications to your Primary Care Provider. *  Please update this list whenever your medications are discontinued, doses are      changed, or new medications (including over-the-counter products) are added. *  Please carry medication information at all times in case of emergency situations. These are general instructions for a healthy lifestyle:    No smoking/ No tobacco products/ Avoid exposure to second hand smoke  Surgeon General's Warning:  Quitting smoking now greatly reduces serious risk to your health.     Obesity, smoking, and sedentary lifestyle greatly increases your risk for illness    A healthy diet, regular physical exercise & weight monitoring are important for maintaining a healthy lifestyle    You may be retaining fluid if you have a history of heart failure or if you experience any of the following symptoms:  Weight gain of 3 pounds or more overnight or 5 pounds in a week, increased swelling in our hands or feet or shortness of breath while lying flat in bed. Please call your doctor as soon as you notice any of these symptoms; do not wait until your next office visit. Recognize signs and symptoms of STROKE:    F-face looks uneven    A-arms unable to move or move unevenly    S-speech slurred or non-existent    T-time-call 911 as soon as signs and symptoms begin-DO NOT go       Back to bed or wait to see if you get better-TIME IS BRAIN. Warning Signs of HEART ATTACK     Call 911 if you have these symptoms:   Chest discomfort. Most heart attacks involve discomfort in the center of the chest that lasts more than a few minutes, or that goes away and comes back. It can feel like uncomfortable pressure, squeezing, fullness, or pain.  Discomfort in other areas of the upper body. Symptoms can include pain or discomfort in one or both arms, the back, neck, jaw, or stomach.  Shortness of breath with or without chest discomfort.  Other signs may include breaking out in a cold sweat, nausea, or lightheadedness. Don't wait more than five minutes to call 911 - MINUTES MATTER! Fast action can save your life. Calling 911 is almost always the fastest way to get lifesaving treatment. Emergency Medical Services staff can begin treatment when they arrive -- up to an hour sooner than if someone gets to the hospital by car. The discharge information has been reviewed with the patient and caregiver. The patient and caregiver verbalized understanding. Discharge medications reviewed with the patient and caregiver and appropriate educational materials and side effects teaching were provided.   ___________________________________________________________________________________________________________________________________

## 2017-11-29 NOTE — PROGRESS NOTES
[facility] REFERRAL CONTACT NOTE  [id number] [place] [address] for Physical Rehabilitation    RE: Crispin Luevano     Thank you for the opportunity to review this patient's case for admission to [address] [place] [address] for Physical Rehabilitation. Based on our pre-admission screening:     [x ] This patient does not meet criteria for admission to [facility] due to:   [x ] patients confusion and therapies recommendation for SNF placement    Again, Thank you for this referral. Should you have any questions please do not hesitate to call. Sincerely,  Valery Giles. Delvin Bhandari, [address]   Delvin Bhandari, JM  Admissions [facility] for Physical Rehabilitation  [phone]

## 2017-12-13 NOTE — PROGRESS NOTES
Outpatient Modified Barium Swallow Evaluation    Patient: Shannan Jackson (80 y.o. female)  Date: 12/13/2017  Primary Diagnosis: Difficulty in swallowing [R13.10]        Precautions: aspiration       Videofluoroscopy Results  MBS completed with results yielding mild oropharyngeal dysphagia. Pt tolerated puree and thin liquids + straw without any aspiration/penetration events. Mildly decreased orolingual strength resulted in increased bolus manipulation and delayed a-p transit. Premature spillage was noted with all PO as well as mild vallecular residue secondary to decreased tongue base retraction. Pt with poor endurance and fell asleep throughout study. She required mod-max cues to redirect towards task from both family support and SLP. Laryngeal elevation and pharyngeal motility/sensation WFL for pt's age. No further PO given at this time secondary to lethargy. Rec puree diet with thin liquids, when alert only, meds crushed, aspiration precautions, and oral care TID. Rec skilled ST services for further diet management and to ensure safety of PO. Results/recommendations and safe swallowing techniques/strategies reviewed with pt's daughter in detail with verbalized understanding post study. Video Flouroscopic Procedures  [x] Lateral View   [] A-P View [x] Scanned to level of Sternum    [x] Seated at 90 deg.   [] Other:    Presentation:    [x] Spoon   [] Cup   [x] Straw   [] Syringe   [] Consecutive Swallows  [] Other:    Consistencies:   [x] Ba+ liquid   [] Ba+ liquid (nectar)   [] Ba+ liquid (honey)   [x] Ba+ puree [] Ba+ cookie     Testing Discontinued:   [x] Due to: lethargy    Treatment Techniques Attempted  [] Head Turn: [] Right [] Left     [] Head Tilt: [] Right [] Left     [] Chin Down:  [] Small Sips/bites:  [] Effortful swallow:  [] Double swallow:  [] Other:    Results  Dysphagia Present:     [x] Yes  [] No    Ratings of Dysphagia:     [x] Mild  [] Moderate  [] Severe    Stages of Breakdown:   [x] Oral  [x] Pharyngeal  [] Esophageal    Aspiration:   [] Yes    [x] No  [] At Risk     Cough: [] Yes      [] No     Penetration:   [] Yes    [x] No     Cough: [] Yes      [] No   [] Flash/trace   [] Mod   [] To Chords          Consistency Aspirated:   Consistency Penetrated:   [] Thin Liquid     [] Thin Liquid  [] Nectar-thick Liquid    [] Nectar-thick Liquid   [] Honey-thick Liquid    [] Honey-thick Liquid   [] Puree     [] Puree  [] Solid     [] Solid     Motility Problems with:  [] Lip Closure:    [x] Mastication: min  [x] Bolus Formation/control: min  [] A-P Transport:  [x] Tongue Base Retraction: min  [] Swallow Response (delayed):  [] Velopharyngeal Closure:  [] Pharyngeal Aspirations:  [] Laryngeal Elevation/adduction:  [] Epiglottic Inversion:  [] Pharyngeal motility/sensation:  [] Cricopharyngeal Relaxation:  [] Esophageal Peristalsis:  [] Other:    Timing of Aspiration/Penetration:  [] Before Swallow:  [] During Swallow:  [] After Swallow:    Transit Time Delay:  [x] >1 Second  Oral  [] >1 Second Pharyngeal  [] >20 Second Esophageal     Residuals:  [x] Vallecula:    [x] Mild  [] Mod  [] Severe  [] Pyriform Sinus:   [] Mild  [] Mod  [] Severe  [] Posterior Pharyngeal Wall:  [] Mild  [] Mod  [] Severe    GCODES(GN): GCODESwallowing:  Swallow Current Status CJ= 20-39%   Swallow Goal Status CJ= 20-39%   Swallow D/C Status CJ= 20-39%    Thank you for this referral.    Jose Cuba M.S. CCC-SLP/L  Speech-Language Pathologist

## 2017-12-15 PROBLEM — J18.9 PNEUMONIA: Status: ACTIVE | Noted: 2017-01-01

## 2017-12-15 NOTE — IP AVS SNAPSHOT
[address] [facility] 
 
 
 [address] Patient: EULALIA RICCI [facility] MRN: [id number] UUJ:9/3/2029 About your hospitalization You were admitted on:  December 15, 2017 You last received care in the:  [facility] [address] [facility] You were discharged on:  December 19, 2017 Why you were hospitalized Your primary diagnosis was:  Not on File Your diagnoses also included:  Pneumonia Things You Need To Do (next 8 weeks) Follow up with Cristi Ames MD  
  
Phone:  [phone] Where:  [address] [facility] [address] Monday Feb 05, 2018 Follow Up with Bola Domingo MD at  3:40 PM  
Where:  [facility] Discharge Orders None A check carlitos indicates which time of day the medication should be taken. My Medications STOP taking these medications   
 acetaminophen 325 mg tablet Commonly known as:  TYLENOL  
   
  
 bisacodyl 5 mg EC tablet Commonly known as:  DULCOLAX HYDROcodone-acetaminophen 5-325 mg per tablet Commonly known as:  [address] these medications as instructed Instructions Each Dose to Equal  
 Morning Noon Evening Bedtime  
 hyoscyamine SL 0.125 mg SL tablet Commonly known as:  LEVSIN/SL Your last dose was: Your next dose is:    
   
   
 1 Tab by SubLINGual route every four (4) hours as needed. 0.125 mg LORazepam 2 mg/mL concentrated solution Commonly known as:  INTENSOL Your last dose was: Your next dose is: Take 0.25 mL by mouth every four (4) hours as needed. Max Daily Amount: 3 mg. 0.5 mg  
    
   
   
   
  
 morphine 20 mg/mL concentrated oral syringe Commonly known as:  Juan C Pique Your last dose was: Your next dose is: Take 0.25 mL by mouth every two (2) hours as needed. Max Daily Amount: 60 mg.  
 5 mg  
    
   
   
   
  
 scopolamine 1 mg over 3 days Pt3d Commonly known as:  TRANSDERM-SCOP Your last dose was: Your next dose is:    
   
   
 1 Patch by TransDERmal route every seventy-two (72) hours as needed for Other (Secretions). 1.5 mg Where to Get Your Medications Information on where to get these meds will be given to you by the nurse or doctor. ! Ask your nurse or doctor about these medications  
  hyoscyamine SL 0.125 mg SL tablet LORazepam 2 mg/mL concentrated solution  
 morphine 20 mg/mL concentrated oral syringe  
 scopolamine 1 mg over 3 days Pt3d Discharge Instructions Pneumonia: Care Instructions Your Care Instructions Pneumonia is an infection of the lungs. Most cases are caused by infections from bacteria or viruses. Pneumonia may be mild or very severe. If it is caused by bacteria, you will be treated with antibiotics. It may take a few weeks to a few months to recover fully from pneumonia, depending on how sick you were and whether your overall health is good. Follow-up care is a key part of your treatment and safety. Be sure to make and go to all appointments, and call your doctor if you are having problems. It's also a good idea to know your test results and keep a list of the medicines you take. How can you care for yourself at home? · Take your antibiotics exactly as directed. Do not stop taking the medicine just because you are feeling better. You need to take the full course of antibiotics. · Take your medicines exactly as prescribed. Call your doctor if you think you are having a problem with your medicine. · Get plenty of rest and sleep. You may feel weak and tired for a while, but your energy level will improve with time.  
· To prevent dehydration, drink plenty of fluids, enough so that your urine is light yellow or clear like water. Choose water and other caffeine-free clear liquids until you feel better. If you have kidney, heart, or liver disease and have to limit fluids, talk with your doctor before you increase the amount of fluids you drink. · Take care of your cough so you can rest. A cough that brings up mucus from your lungs is common with pneumonia. It is one way your body gets rid of the infection. But if coughing keeps you from resting or causes severe fatigue and chest-wall pain, talk to your doctor. He or she may suggest that you take a medicine to reduce the cough. · Use a vaporizer or humidifier to add moisture to your bedroom. Follow the directions for cleaning the machine. · Do not smoke or allow others to smoke around you. Smoke will make your cough last longer. If you need help quitting, talk to your doctor about stop-smoking programs and medicines. These can increase your chances of quitting for good. · Take an over-the-counter pain medicine, such as acetaminophen (Tylenol), ibuprofen (Advil, Motrin), or naproxen (Aleve). Read and follow all instructions on the label. · Do not take two or more pain medicines at the same time unless the doctor told you to. Many pain medicines have acetaminophen, which is Tylenol. Too much acetaminophen (Tylenol) can be harmful. · If you were given a spirometer to measure how well your lungs are working, use it as instructed. This can help your doctor tell how your recovery is going. · To prevent pneumonia in the future, talk to your doctor about getting a flu vaccine (once a year) and a pneumococcal vaccine (one time only for most people). When should you call for help? Call 911 anytime you think you may need emergency care. For example, call if: 
? · You have severe trouble breathing. ?Call your doctor now or seek immediate medical care if: 
? · You cough up dark brown or bloody mucus (sputum). ? · You have new or worse trouble breathing. ? · You are dizzy or lightheaded, or you feel like you may faint. ? Watch closely for changes in your health, and be sure to contact your doctor if: 
? · You have a new or higher fever. ? · You are coughing more deeply or more often. ? · You are not getting better after 2 days (48 hours). ? · You do not get better as expected. Where can you learn more? Go to http://fanny-chad.info/. Enter [id number] in the search box to learn more about \"Pneumonia: Care Instructions. \" Current as of: May 12, 2017 Content Version: 11.4 © 1455-7210 [employer]. Care instructions adapted under license by [employer] (which disclaims liability or warranty for this information). If you have questions about a medical condition or this instruction, always ask your healthcare professional. [place] [address] any warranty or liability for your use of this information. Learning About Hospice and Palliative Care What are hospice and palliative care? Palliative (say \"PAL-lionel-uh-tiv\") care is an area of medicine that helps give you more good days by providing care for quality-of-life issues. It includes treating symptoms like pain, nausea, or sleep problems. It can also include helping you and your loved ones to: · Understand your illness better. · Talk more openly about your feelings. · Decide what treatments you want or don't want. · Communicate better with your doctors, nurses, and each other. Hospice care is a type of palliative care. But it's for people who are near the end of life. What kinds of care are involved? Palliative care: This treatment helps you feel better physically, emotionally, and spiritually while doctors also treat your illness. Your care may include pain relief, counseling, or nutrition advice.  
Hospice care: Again, the goal of this type of care is to help you feel better. And it can help you get the most out of the time you have left. But you no longer get treatment to try to cure your illness. When does care happen? Palliative care: This care can happen at any time during a serious illness. You don't have to be near death to get this care. Hospice care: In most cases, you can choose hospice care when your doctor believes that you have no more than about 6 months to live. Where does the care happen? Palliative care: This care often happens in hospitals or long-term care facilities like nursing homes. It can take place wherever you are treated, even in your home. Hospice care: Most hospice care is done in the place the patient calls \"home. \" This is often the person's home. But it could also be a place like a nursing home or [facility] center. Hospice care may also be given in hospice centers, hospitals, and other places. Who provides the care? Palliative care: There are doctors and nurses who specialize in this field. But your own doctor may also give some of this care. And there are many other experts who may help you. These include social workers, counselors, therapists, and nutrition experts. Hospice care: In hospitals, hospice centers, and other facilities, care is given by doctors, nurses, and others who are trained in hospice care. In the home, a family member is often the main caregiver. But the family member gets help from care experts. They are on call 24 hours a day. Where can you learn more? Go to http://fanny-chad.info/. Enter [id number] in the search box to learn more about \"Learning About Hospice and Palliative Care. \" Current as of: September 24, 2016 Content Version: 11.4 © 7126-4502 [employer]. Care instructions adapted under license by [employer] (which disclaims liability or warranty for this information).  If you have questions about a medical condition or this instruction, always ask your healthcare professional. [place] [address] any warranty or liability for your use of this information. Patient armband removed and shredded DISCHARGE SUMMARY from Nurse PATIENT INSTRUCTIONS: 
 
 
F-face looks uneven A-arms unable to move or move unevenly S-speech slurred or non-existent T-time-call 911 as soon as signs and symptoms begin-DO NOT go Back to bed or wait to see if you get better-TIME IS BRAIN. Warning Signs of HEART ATTACK Call 911 if you have these symptoms: 
? Chest discomfort. Most heart attacks involve discomfort in the center of the chest that lasts more than a few minutes, or that goes away and comes back. It can feel like uncomfortable pressure, squeezing, fullness, or pain. ? Discomfort in other areas of the upper body. Symptoms can include pain or discomfort in one or both arms, the back, neck, jaw, or stomach. ? Shortness of breath with or without chest discomfort. ? Other signs may include breaking out in a cold sweat, nausea, or lightheadedness. Don't wait more than five minutes to call [address]! Fast action can save your life. Calling 911 is almost always the fastest way to get lifesaving treatment. Emergency Medical Services staff can begin treatment when they arrive  up to an hour sooner than if someone gets to the hospital by car. The discharge information has been reviewed with the caregiver. The caregiver verbalized understanding. Discharge medications reviewed with the caregiver and appropriate educational materials and side effects teaching were provided. ___________________________________________________________________________________________________________________________________ Unresulted Labs-Please follow up with your PCP about these lab tests Order Current Status CULTURE, BLOOD Preliminary result CULTURE, BLOOD Preliminary result CULTURE, RESPIRATORY/SPUTUM/BRONCH W GRAM STAIN Preliminary result Providers Seen During Your Hospitalization Provider Specialty Primary office phone Roman Chery MD Emergency Medicine [phone] Matt Cunningham MD Internal Medicine [phone] Marty Brantley MD [facility] [phone] Megan Seth MD Internal Medicine [phone] Your Primary Care Physician (PCP) Primary Care Physician Office Phone Office Fax Laura Mccann [phone] You are allergic to the following Allergen Reactions Erythromycin Itching Recent Documentation Height Weight BMI OB Status Smoking Status 1.549 m 44 kg 18.35 kg/m2 Hysterectomy Former Smoker Emergency Contacts Name Discharge Info Relation Home Work Mobile Yamileth Brink DISCHARGE CAREGIVER [3] Child [2] [phone] Patient Belongings The following personal items are in your possession at time of discharge: 
  Dental Appliances: Partials, Uppers  Visual Aid: None      Home Medications: None   Jewelry: Ring  Clothing: None    Other Valuables: Eyeglasses Please provide this summary of care documentation to your next provider. Signatures-by signing, you are acknowledging that this After Visit Summary has been reviewed with you and you have received a copy. Patient Signature:  ____________________________________________________________ Date:  ____________________________________________________________  
  
[facility] Provider Signature:  ____________________________________________________________ Date:  ____________________________________________________________

## 2017-12-15 NOTE — ED TRIAGE NOTES
The patient presents from [facility], accompanied by medic, for complaint of a fever and shortness of breath. She received on [employer] by medicstaci MATHEWS.

## 2017-12-15 NOTE — H&P
History and Physical      NAME:  Judith Aviles   :   4/3/1925   MRN:   [id number]     Date/Time:  12/15/2017     CHIEF COMPLAINT: altered mental status     HISTORY OF PRESENT ILLNESS:     Ms. Jaimie Duran is a 80 y.o.   female with a recent left hip fracture who had ORIF on 17 by Dr Moo Farfan and discharged to SNF. She was having confusion secondary to acute metabolic encephalopathy during her hospital stay. According to her family, she really never back to her base line. She continued to have confusion. She was diagnosed with pneumonia in SNF and completed 2 courses of antibiotics. Her confusion gets worse and she was spiking fever. She has cough, non productive. No chest pain. Has shortness of breathing. Patient is poor historian. Here in Ed she has fever with temp 100.7 and CXR that showed Left lower lobe consolidation. She is started on triple antibiotics to cover for HCAP and admitted for further evaluation and management.     Past Medical History:   Diagnosis Date    Acute blood loss as cause of postoperative anemia 2017    Do not resuscitate status 2017    Heme positive stool 2017    History of syncope     Severe aortic stenosis by prior echocardiogram 2017    Severe mitral regurgitation by prior echocardiogram 2017        Past Surgical History:   Procedure Laterality Date    HX CHOLECYSTECTOMY  2015    HX HEMORRHOIDECTOMY      HX HERNIA REPAIR  2015    HX HIP FRACTURE TX Left 2017    S/P Open reduction internal fixation of left displaced 4-part intertrochanteric femur fracture with intramedullary nail (2017 - Sherman Castillo)    HX HYSTERECTOMY         Social History   Substance Use Topics    Smoking status: Former Smoker     Packs/day: 1.00     Years: 22.00     Quit date: 1967    Smokeless tobacco: Never Used    Alcohol use Yes      Comment: occasional        Family History   Problem Relation Age of Onset    Heart Disease Mother     Hypertension Mother     Lung Disease Father     Stroke Neg Hx     Diabetes Neg Hx     Cancer Neg Hx         Allergies   Allergen Reactions    Erythromycin Itching        Prior to Admission medications    Medication Sig Start Date End Date Taking? Authorizing Provider   bisacodyl (DULCOLAX) 5 mg EC tablet Take 2 Tabs by mouth daily as needed for Constipation. 11/29/17   Lubna Alvarez MD   HYDROcodone-acetaminophen [facility]) 5-325 mg per tablet Take 1 Tab by mouth every four (4) hours as needed. Max Daily Amount: 6 Tabs. For pain unrelieved by Tylenol. 11/29/17   Lubna Alvarez MD   acetaminophen (TYLENOL) 325 mg tablet Take 2 Tabs by mouth every six (6) hours as needed for Pain or Fever.  Indications: Pain 11/29/17   Lubna Alvarez MD       REVIEW OF SYSTEMS:     CONSTITUTIONAL: No weight loss, No Night sweats  HEENT:  No epistaxis, No diff in swallowing  CVS: No chest pain, No palpitations, No syncope, No peripheral edema, No PND, No orthopnea  RS: No hemoptysis, No pleuritic chest pain  GI: No abd pain, No vomitting, No diarrhea, No hematemesis, No rectal bleeding, No acid reflux or heartburn  NEURO: No focal weakness, No headaches, No seizures  PSYCH: No anxiety, No depression  MUSCULOSKLETAL: No joint pain or swelling  : No hematuria or dysuria  SKIN: No rash      Physical Exam:    VITALS:    Vital signs reviewed; most recent are:    Visit Vitals    /41    Pulse (!) 55    Temp (!) 100.7 °F (38.2 °C)    Resp 27    Ht 5' 1\" (1.549 m)    Wt 38.1 kg (83 lb 15.9 oz)    SpO2 96%    BMI 15.87 kg/m2     SpO2 Readings from Last 6 Encounters:   12/15/17 96%   11/29/17 94%   08/08/17 97%   08/01/17 96%   06/28/17 96%   12/10/15 97%        No intake or output data in the 24 hours ending 12/15/17 1412       GENERAL: Not in acute distress  HEENT: pink conjunctiva, un icteric sclera,   NECK: No lymphadenopthy or thyroid swelling, JVD not seen  LYMPH: No supraclavicular or cervical or axillary nodes on both sides  CVS: S1S2, No murmurs, No gallop or rub  RS: CTA, No wheezing or crackles  Abd: Soft, non tender, not distended, No guarding, No rigidity  NEURO:  No focal neurologic deficits   Extrm: no leg edema or swelling   Skin: No rash      Labs:  Recent Results (from the past 24 hour(s))   EKG, 12 LEAD, INITIAL    Collection Time: 12/15/17 11:28 AM   Result Value Ref Range    Ventricular Rate 61 BPM    Atrial Rate 61 BPM    P-R Interval 220 ms    QRS Duration 96 ms    Q-T Interval 516 ms    QTC Calculation (Bezet) 519 ms    Calculated P Axis 69 degrees    Calculated R Axis -45 degrees    Calculated T Axis 58 degrees    Diagnosis       Sinus rhythm with 1st degree AV block  Possible Left atrial enlargement  Incomplete right bundle branch block  Left anterior fascicular block  Left ventricular hypertrophy  ST & T wave abnormality, consider anterolateral ischemia  Prolonged QT  Abnormal ECG  When compared with ECG of 21-NOV-2017 06:40,  Sinus rhythm is no longer with 2nd degree AV block (Mobitz II)  Non-specific change in ST segment in Inferior leads  QT has lengthened     URINALYSIS W/ RFLX MICROSCOPIC    Collection Time: 12/15/17 11:30 AM   Result Value Ref Range    Color YELLOW      Appearance CLEAR      Specific gravity 1.023 1.005 - 1.030      pH (UA) 7.0 5.0 - 8.0      Protein 30 (A) NEG mg/dL    Glucose NEGATIVE  NEG mg/dL    Ketone 15 (A) NEG mg/dL    Bilirubin NEGATIVE  NEG      Blood NEGATIVE  NEG      Urobilinogen 1.0 0.2 - 1.0 EU/dL    Nitrites NEGATIVE  NEG      Leukocyte Esterase NEGATIVE  NEG     METABOLIC PANEL, COMPREHENSIVE    Collection Time: 12/15/17 11:30 AM   Result Value Ref Range    Sodium 131 (L) 136 - 145 mmol/L    Potassium 4.0 3.5 - 5.5 mmol/L    Chloride 97 (L) 100 - 108 mmol/L    CO2 26 21 - 32 mmol/L    Anion gap 8 3.0 - 18 mmol/L    Glucose 137 (H) 74 - 99 mg/dL    BUN 32 (H) 7.0 - 18 MG/DL    Creatinine 0.84 0.6 - 1.3 MG/DL    BUN/Creatinine ratio 38 (H) 12 - 20      GFR est AA >60 >60 ml/min/1.73m2    GFR est non-AA >60 >60 ml/min/1.73m2    Calcium 8.8 8.5 - 10.1 MG/DL    Bilirubin, total 1.1 (H) 0.2 - 1.0 MG/DL    ALT (SGPT) 24 13 - 56 U/L    AST (SGOT) 24 15 - 37 U/L    Alk. phosphatase 129 (H) 45 - 117 U/L    Protein, total 7.0 6.4 - 8.2 g/dL    Albumin 3.0 (L) 3.4 - 5.0 g/dL    Globulin 4.0 2.0 - 4.0 g/dL    A-G Ratio 0.8 0.8 - 1.7     CBC WITH AUTOMATED DIFF    Collection Time: 12/15/17 11:30 AM   Result Value Ref Range    WBC 6.2 4.6 - 13.2 K/uL    RBC 4.14 (L) 4.20 - 5.30 M/uL    HGB 12.8 12.0 - 16.0 g/dL    HCT 38.2 35.0 - 45.0 %    MCV 92.3 74.0 - 97.0 FL    MCH 30.9 24.0 - 34.0 PG    MCHC 33.5 31.0 - 37.0 g/dL    RDW 15.6 (H) 11.6 - 14.5 %    PLATELET 596 968 - 903 K/uL    MPV 9.5 9.2 - 11.8 FL    NEUTROPHILS 86 (H) 42 - 75 %    LYMPHOCYTES 9 (L) 20 - 51 %    MONOCYTES 5 2 - 9 %    EOSINOPHILS 0 0 - 5 %    BASOPHILS 0 0 - 3 %    ABS. NEUTROPHILS 5.3 1.8 - 8.0 K/UL    ABS. LYMPHOCYTES 0.6 (L) 0.8 - 3.5 K/UL    ABS. MONOCYTES 0.3 0 - 1.0 K/UL    ABS. EOSINOPHILS 0.0 0.0 - 0.4 K/UL    ABS.  BASOPHILS 0.0 0.0 - 0.06 K/UL    DF MANUAL      PLATELET COMMENTS ADEQUATE PLATELETS      RBC COMMENTS ANISOCYTOSIS  1+       URINE MICROSCOPIC ONLY    Collection Time: 12/15/17 11:30 AM   Result Value Ref Range    WBC 4 to 10 0 - 4 /hpf    Epithelial cells FEW 0 - 5 /lpf    Bacteria 2+ (A) NEG /hpf   STREP PNEUMO AG, URINE    Collection Time: 12/15/17 11:30 AM   Result Value Ref Range    Strep pneumo Ag, urine NEGATIVE  NEG     LEGIONELLA PNEUMOPHILA AG, URINE    Collection Time: 12/15/17 11:30 AM   Result Value Ref Range    Legionella Ag, urine NEGATIVE  NEG     POC LACTIC ACID    Collection Time: 12/15/17 11:42 AM   Result Value Ref Range    Lactic Acid (POC) 1.9 0.4 - 2.0 mmol/L   INFLUENZA A & B AG (RAPID TEST)    Collection Time: 12/15/17 12:00 PM   Result Value Ref Range    Influenza A Antigen NEGATIVE  NEG      Influenza B Antigen NEGATIVE  NEG           Active Problems:    Pneumonia (12/15/2017)        Assessment:       1. Acute metabolic encephalopathy  2. Pneumonia, Left lower lobe consolidation, HCAP  3. Left hip fracture s/p ORIF on 11/19/17 by Dr Krish Truong  4. Aortic stenosis, EDUARDO 0.74 on 09/01/17    Plan:       · Admit to medical floor. · Blood and sputum culture if she produces  · Continue IV antibiotics with Vancomycin, cefepim and levaquin as started in ED for now. · Will de-escalate based on cultures and clinical response  · Hold of narcotics due to her confusion  · D/w family at the bedside. She is DNR. · She had recent modified barrium swallow and passed according to the family. · DVT prophylaxsis  · Continue PT/OT as tolerated    Total time:  62 minutes             _______________________________________________________________________        Attending Physician:  Israel Hazel MD

## 2017-12-15 NOTE — ED PROVIDER NOTES
EMERGENCY DEPARTMENT HISTORY AND PHYSICAL EXAM    11:26 AM      Date: 12/15/2017  Patient Name: Milton Hand    History of Presenting Illness     Chief Complaint   Patient presents with    Fever    Shortness of Breath         History Provided By: Patient's Daughter and EMS    Chief Complaint: SOB and unresponsive   Duration:  Hours  Timing:  Progressive  Location: N/A  Quality: N/A  Severity: N/A  Modifying Factors: none  Associated Symptoms: N/A      Additional History (Context): Milton Hand is a 80 y.o. female with pneumonia  who [facility]. The pt is DNR. Per EMS the pt is usually alert and confused; the facility told EMS that the pt has been febrile at 101.9 and had become unresponsive. The paramedics says the pt will make a noise when stimulated by pain. Paramedics states that on arrival the pt was not well appearing and warm to the touch. Paramedics says the pt was having difficulty breathing so treated her with fluids and dual neb while enroute to the ED; after treatment pt breathing improved. Paramedics says the pt has history of pneumonia; which she is currently taking dicyclomine to treat. Per the pt family the pt has been getting worse since her hip was operated on. Per the daughter she has been taking Abx for the pneumonia for the past 7 days; says the medication was recently changed. The pt HPI and ROS is limited due to the pt current condition.      PCP: Marilyn Gil MD    Current Facility-Administered Medications   Medication Dose Route Frequency Provider Last Rate Last Dose    sodium chloride (NS) flush 5-10 mL  5-10 mL IntraVENous PRN Leoncio Cobian MD        levoFLOXacin (LEVAQUIN) 750 mg in D5W IVPB  750 mg IntraVENous Q24H Leoncio Samples,  mL/hr at 12/15/17 1201 750 mg at 12/15/17 1201    vancomycin (VANCOCIN) 1,000 mg in 0.9% sodium chloride (MBP/ADV) 250 mL adv  1,000 mg IntraVENous ONCE Leoncio Samples,  mL/hr at 12/15/17 1212 1,000 mg at 12/15/17 1212    cefepime (MAXIPIME) 2 g in sterile water (preservative free) 10 mL IV syringe  2 g IntraVENous Q12H Dudley Espinoza MD   2 g at 12/15/17 1200    VANCOMYCIN INFORMATION NOTE   Other CONTINUOUS Dudley Espinoza MD         Current Outpatient Prescriptions   Medication Sig Dispense Refill    bisacodyl (DULCOLAX) 5 mg EC tablet Take 2 Tabs by mouth daily as needed for Constipation. 30 Tab 0    HYDROcodone-acetaminophen (NORCO) 5-325 mg per tablet Take 1 Tab by mouth every four (4) hours as needed. Max Daily Amount: 6 Tabs. For pain unrelieved by Tylenol. 20 Tab 0    acetaminophen (TYLENOL) 325 mg tablet Take 2 Tabs by mouth every six (6) hours as needed for Pain or Fever. Indications: Pain 30 Tab 0       Past History     Past Medical History:  Past Medical History:   Diagnosis Date    Acute blood loss as cause of postoperative anemia 11/20/2017    Do not resuscitate status 11/19/2017    Heme positive stool 11/26/2017    History of syncope     Severe aortic stenosis by prior echocardiogram 8/1/2017    Severe mitral regurgitation by prior echocardiogram 8/1/2017       Past Surgical History:  Past Surgical History:   Procedure Laterality Date    HX CHOLECYSTECTOMY  6/2015    HX HEMORRHOIDECTOMY      HX HERNIA REPAIR  06/2015    HX HIP FRACTURE TX Left 11/19/2017    S/P Open reduction internal fixation of left displaced 4-part intertrochanteric femur fracture with intramedullary nail (11/19/2017 - Dr. Efra Reyes.)    HX HYSTERECTOMY         Family History:  Family History   Problem Relation Age of Onset    Heart Disease Mother     Hypertension Mother     Lung Disease Father     Stroke Neg Hx     Diabetes Neg Hx     Cancer Neg Hx        Social History:  Social History   Substance Use Topics    Smoking status: Former Smoker     Packs/day: 1.00     Years: 22.00     Quit date: 8/1/1967    Smokeless tobacco: Never Used    Alcohol use Yes      Comment: occasional       Allergies:   Allergies   Allergen Reactions    Erythromycin Itching         Review of Systems       Review of Systems   Unable to perform ROS: Acuity of condition         Physical Exam     Patient Vitals for the past 12 hrs:   Temp Pulse Resp BP SpO2   12/15/17 1315 - (!) 55 27 115/41 96 %   12/15/17 1230 - 60 (!) 37 126/44 94 %   12/15/17 1215 - 60 26 113/41 94 %   12/15/17 1200 - 64 28 (!) 124/33 95 %   12/15/17 1145 (!) 100.7 °F (38.2 °C) 60 26 139/45 95 %           Physical Exam   Constitutional: She is oriented to person, place, and time. The pt mumbles one words sentences. The pt is not really responsive to painful stimuli. HENT:   Head: Normocephalic and atraumatic. Eyes: Conjunctivae and EOM are normal.   Neck: Normal range of motion. Cardiovascular: Normal heart sounds. Exam reveals no gallop and no friction rub. No murmur heard. Pulmonary/Chest: Effort normal and breath sounds normal. No stridor. Abdominal: Soft. There is no tenderness. Musculoskeletal: Normal range of motion. She exhibits no tenderness. Neurological: She is oriented to person, place, and time. Skin: Skin is warm and dry. She is not diaphoretic. Psychiatric: She has a normal mood and affect. Her behavior is normal.   Nursing note and vitals reviewed.         Diagnostic Study Results     Labs -  Recent Results (from the past 12 hour(s))   EKG, 12 LEAD, INITIAL    Collection Time: 12/15/17 11:28 AM   Result Value Ref Range    Ventricular Rate 61 BPM    Atrial Rate 61 BPM    P-R Interval 220 ms    QRS Duration 96 ms    Q-T Interval 516 ms    QTC Calculation (Bezet) 519 ms    Calculated P Axis 69 degrees    Calculated R Axis -45 degrees    Calculated T Axis 58 degrees    Diagnosis       Sinus rhythm with 1st degree AV block  Possible Left atrial enlargement  Incomplete right bundle branch block  Left anterior fascicular block  Left ventricular hypertrophy  ST & T wave abnormality, consider anterolateral ischemia  Prolonged QT  Abnormal ECG  When compared with ECG of 21-NOV-2017 06:40,  Sinus rhythm is no longer with 2nd degree AV block (Mobitz II)  Non-specific change in ST segment in Inferior leads  QT has lengthened     URINALYSIS W/ RFLX MICROSCOPIC    Collection Time: 12/15/17 11:30 AM   Result Value Ref Range    Color YELLOW      Appearance CLEAR      Specific gravity 1.023 1.005 - 1.030      pH (UA) 7.0 5.0 - 8.0      Protein 30 (A) NEG mg/dL    Glucose NEGATIVE  NEG mg/dL    Ketone 15 (A) NEG mg/dL    Bilirubin NEGATIVE  NEG      Blood NEGATIVE  NEG      Urobilinogen 1.0 0.2 - 1.0 EU/dL    Nitrites NEGATIVE  NEG      Leukocyte Esterase NEGATIVE  NEG     METABOLIC PANEL, COMPREHENSIVE    Collection Time: 12/15/17 11:30 AM   Result Value Ref Range    Sodium 131 (L) 136 - 145 mmol/L    Potassium 4.0 3.5 - 5.5 mmol/L    Chloride 97 (L) 100 - 108 mmol/L    CO2 26 21 - 32 mmol/L    Anion gap 8 3.0 - 18 mmol/L    Glucose 137 (H) 74 - 99 mg/dL    BUN 32 (H) 7.0 - 18 MG/DL    Creatinine 0.84 0.6 - 1.3 MG/DL    BUN/Creatinine ratio 38 (H) 12 - 20      GFR est AA >60 >60 ml/min/1.73m2    GFR est non-AA >60 >60 ml/min/1.73m2    Calcium 8.8 8.5 - 10.1 MG/DL    Bilirubin, total 1.1 (H) 0.2 - 1.0 MG/DL    ALT (SGPT) 24 13 - 56 U/L    AST (SGOT) 24 15 - 37 U/L    Alk. phosphatase 129 (H) 45 - 117 U/L    Protein, total 7.0 6.4 - 8.2 g/dL    Albumin 3.0 (L) 3.4 - 5.0 g/dL    Globulin 4.0 2.0 - 4.0 g/dL    A-G Ratio 0.8 0.8 - 1.7     CBC WITH AUTOMATED DIFF    Collection Time: 12/15/17 11:30 AM   Result Value Ref Range    WBC 6.2 4.6 - 13.2 K/uL    RBC 4.14 (L) 4.20 - 5.30 M/uL    HGB 12.8 12.0 - 16.0 g/dL    HCT 38.2 35.0 - 45.0 %    MCV 92.3 74.0 - 97.0 FL    MCH 30.9 24.0 - 34.0 PG    MCHC 33.5 31.0 - 37.0 g/dL    RDW 15.6 (H) 11.6 - 14.5 %    PLATELET 373 787 - 594 K/uL    MPV 9.5 9.2 - 11.8 FL    NEUTROPHILS 86 (H) 42 - 75 %    LYMPHOCYTES 9 (L) 20 - 51 %    MONOCYTES 5 2 - 9 %    EOSINOPHILS 0 0 - 5 %    BASOPHILS 0 0 - 3 %    ABS. NEUTROPHILS 5.3 1.8 - 8.0 K/UL    ABS.  LYMPHOCYTES 0.6 (L) 0.8 - 3.5 K/UL    ABS. MONOCYTES 0.3 0 - 1.0 K/UL    ABS. EOSINOPHILS 0.0 0.0 - 0.4 K/UL    ABS. BASOPHILS 0.0 0.0 - 0.06 K/UL    DF MANUAL      PLATELET COMMENTS ADEQUATE PLATELETS      RBC COMMENTS ANISOCYTOSIS  1+       URINE MICROSCOPIC ONLY    Collection Time: 12/15/17 11:30 AM   Result Value Ref Range    WBC 4 to 10 0 - 4 /hpf    Epithelial cells FEW 0 - 5 /lpf    Bacteria 2+ (A) NEG /hpf   POC LACTIC ACID    Collection Time: 12/15/17 11:42 AM   Result Value Ref Range    Lactic Acid (POC) 1.9 0.4 - 2.0 mmol/L   INFLUENZA A & B AG (RAPID TEST)    Collection Time: 12/15/17 12:00 PM   Result Value Ref Range    Influenza A Antigen NEGATIVE  NEG      Influenza B Antigen NEGATIVE  NEG         Radiologic Studies -   CT HEAD WO CONT   Final Result      XR CHEST PORT    (Results Pending)         Medical Decision Making   Provider Notes (Medical Decision Making): Here with likely PNA given fever, tachypnea, that has failed outpt therapy as Ezequiel Hercules attempted doxy without success. Sat well on room air, does not require additional resp support at the moment. Cont to be altered since last admission- will send for CT head, check urine, follow labs. Family updated on status, and she is confirmed DNR. I am the first provider for this patient. I reviewed the vital signs, available nursing notes, past medical history, past surgical history, family history and social history. Vital Signs-Reviewed the patient's vital signs. Pulse Oximetry Analysis -  95 on room air (Interpretation)    Cardiac Monitor:  Rate:   Rhythm:  Normal Sinus Rhythm rate 60    EKG: Interpreted by the EP. Time Interpreted:    Rate: 61   Rhythm: 1st degree AV block   Interpretation:Non specific ST waves with incomplete RBB       Records Reviewed: Old Medical Records (Time of Review: 11:26 AM)    1:37 PM Consult: I discussed care with Dr. Luz Maria Valdivia (hospitalist).   It was a standard discussion including patient history, chief complaint, available diagnostic results, and predicted treatment course. He will accept the patient. -TF          For Hospitalized Patients:    1. Hospitalization Decision Time:  The decision to hospitalize the patient was made by Dr. Mary Beth Greene at [phone] on 12/15/2017      Diagnosis     Clinical Impression:   1. Pneumonia of right lung due to infectious organism, unspecified part of lung    2. Altered mental status, unspecified altered mental status type        Disposition: Admit    Follow-up Information     None           Patient's Medications   Start Taking    No medications on file   Continue Taking    ACETAMINOPHEN (TYLENOL) 325 MG TABLET    Take 2 Tabs by mouth every six (6) hours as needed for Pain or Fever. Indications: Pain    BISACODYL (DULCOLAX) 5 MG EC TABLET    Take 2 Tabs by mouth daily as needed for Constipation. HYDROCODONE-ACETAMINOPHEN (NORCO) 5-325 MG PER TABLET    Take 1 Tab by mouth every four (4) hours as needed. Max Daily Amount: 6 Tabs. For pain unrelieved by Tylenol. These Medications have changed    No medications on file   Stop Taking    No medications on file     _______________________________    Attestations:  [address] acting as a scribe for and in the presence of Dudley Espinoza MD      December 15, 2017 at 11:26 AM       Provider Attestation:      I personally performed the services described in the documentation, reviewed the documentation, as recorded by the scribe in my presence, and it accurately and completely records my words and actions.  December 15, 2017 at 11:26 AM - Dudley Espinoza MD    _______________________________

## 2017-12-16 NOTE — PROGRESS NOTES
NUTRITION    Nursing Referral: Pressure Injury  Nutrition Consult: General Nutrition Management & Supplements      RECOMMENDATIONS / PLAN:     - Add supplement: Ensure Enlive TID  - Downgrade diet consistency to pureed per SLP recommendation on 12/13/17  - Modify diet order to include \"no straws\" per family report  - Encourage and monitor po intake  - Assistance with meals as needed  - Re-check pt's weight  - Continue RD inpatient monitoring and evaluation. NUTRITION INTERVENTIONS & DIAGNOSIS:     [x] Meals/Snacks: modified diet  [x] Medical food supplementation: add     Nutrition Diagnosis:  Inadequate oral intake related to decreased appetite as evidenced by pt consuming 1 meal per day x 2-3 weeks PTA. Increased nutrient needs (protein) related to promotion of wound healing as evidenced by pressure injury    Patient meets criteria for Severe Protein Calorie Malnutrition as evidenced by:   ASPEN Malnutrition Criteria  Acute Illness, Chronic Illness, or Social/Enviornmental: Acute illness  Energy Intake: Less than/equal to 50% est energy req for greater than/equal to 5 days  Body Fat: Moderate (triceps)  ASPEN Malnutrition Score - Acute Illness: 12  Acute Illness - Malnutrition Diagnosis: Severe malnutrition. ASSESSMENT:     Pt lethargic at time of visit; not eating dinner meal. Family present; report poor po intake x 2-3 weeks PTA, pt consuming 1 meal per day during that time. Needs assistance with meals.  S/p MBS on 12/13/17; SLP recommended pureed diet with thin liquids    Average po intake adequate to meet patients estimated nutritional needs:   [] Yes     [x] No   [] Unable to determine at this time    Diet: DIET REGULAR      Food Allergies: none known  Current Appetite:   [] Good     [] Fair     [] Poor     [x] Other: lethargic  Appetite/meal intake prior to admission:   [] Good     [] Fair     [x] Poor x 2-3 weeks PTA    [] Other:  Feeding Limitations:  [] Swallowing difficulty    [] Chewing difficulty    [x] Other: per family report, pt had recent MBS and passed, but no straws allowed. Per chart hx, pt s/p MBS on 12/13/17 with SLP recommendation of purred diet and thin liquids  Current Meal Intake: No data found. BM:  Unknown   Skin Integrity: unstageable left heel pressure injury  Edema: none  Pertinent Medications: Reviewed    Recent Labs      12/16/17   0430  12/15/17   1130   NA  135*  131*   K  3.8  4.0   CL  103  97*   CO2  23  26   GLU  89  137*   BUN  32*  32*   CREA  0.55*  0.84   CA  8.2*  8.8   ALB   --   3.0*   SGOT   --   24   ALT   --   24       Intake/Output Summary (Last 24 hours) at 12/16/17 1810  Last data filed at 12/16/17 1146   Gross per 24 hour   Intake              260 ml   Output                0 ml   Net              260 ml       Anthropometrics:  Ht Readings from Last 1 Encounters:   12/15/17 5' 1\" (1.549 m)     Last 3 Recorded Weights in this Encounter    12/15/17 1143 12/15/17 1146   Weight: 44 kg (97 lb) 38.1 kg (83 lb 15.9 oz)     Body mass index is 15.87 kg/(m^2). Weight History:  Family reported pt weighing 97 lb about 1 week PTA.  Question accuracy of current weight    Weight Metrics 12/15/2017 11/19/2017 8/8/2017 8/1/2017 6/28/2017 12/10/2015 6/29/2015   Weight 83 lb 15.9 oz 97 lb 90 lb 90 lb 99 lb 98 lb 94 lb   BMI 15.87 kg/m2 18.94 kg/m2 17.58 kg/m2 17.58 kg/m2 19.33 kg/m2 18.53 kg/m2 17.77 kg/m2        Admitting Diagnosis: Pneumonia  Pertinent PMHx:  Dysphagia    Education Needs:        [x] None identified  [] Identified - Not appropriate at this time  []  Identified and addressed - refer to education log  Learning Limitations:   [] None identified  [x] Identified: lethargy    Cultural, Yazidi & ethnic food preferences:  [x] None identified    [] Identified and addressed     ESTIMATED NUTRITION NEEDS:     Calories: 1927-2307 kcal (35-40 kcal/kg) based on  [x] Actual BW: 38 kg      [] IBW   Protein: 46-57 gm (1.2-1.5 gm/kg) based on  [x] Actual BW [] IBW   Fluid: 1 mL/kcal     MONITORING & EVALUATION:     Nutrition Goal(s):   1. Po intake of meals will meet >75% of patient estimated nutritional needs within the next 7 days.   Outcome:  [] Met/Ongoing    []  Not Met    [x] New/Initial Goal     Monitoring:   [x] Diet tolerance   [x] Meal intake   [x] Supplement intake   [] GI symptoms/ability to tolerate po diet   [] Respiratory status   [] Plan of care      Previous Recommendations (for follow-up assessments only):     []   Implemented       []   Not Implemented (RD to address)      [] No Longer Appropriate     [] No Recommendation Made     Discharge Planning: regular diet; consistency per SLP  [x] Participated in care planning, discharge planning, & interdisciplinary rounds as appropriate      Sebastián Armendariz, [address] [place] [address]   Pager: [phone]

## 2017-12-16 NOTE — PROGRESS NOTES
[facility]ist [facility]  Progress Note    Patient: Sofiya Diaz Age: 80 y.o. : 4/3/1925 MR#: [id number] SSN: [id number]  Date: 2017     Subjective:     Patient lying in bed in NAD, awake, confused, did not follow commands    Assessment/Plan:     1. Acute metabolic encephalopathy  2. Pneumonia, Left lower lobe consolidation, HCAP  3. Left hip fracture s/p ORIF on 17 by Dr Sofie Good  4. Severe Aortic stenosis, EDUARDO 0.74 on 17   5.  Moderate protein calorie malnutrition    PLAN  Continue abx, follow cx  Bronchial hygiene  Gentle hydration  Nutrition eval  DVT prophylaxis  Palliative care consult  Tried to call family- [phone], left message    Case discussed with:  [x]Patient  []Family  []Nursing  []Case Management  DVT Prophylaxis:  []Lovenox  [x]Hep SQ  []SCDs  []Coumadin   []On Heparin gtt    Objective:   VS:   Visit Vitals    /72 (BP 1 Location: Right arm, BP Patient Position: At rest)    Pulse 74    Temp 97.6 °F (36.4 °C)    Resp 18    Ht 5' 1\" (1.549 m)    Wt 38.1 kg (83 lb 15.9 oz)    SpO2 97%    BMI 15.87 kg/m2      Tmax/24hrs: Temp (24hrs), Av.3 °F (36.8 °C), Min:97.2 °F (36.2 °C), Max:99.9 °F (37.7 °C)    Intake/Output Summary (Last 24 hours) at 17 1231  Last data filed at 17 1146   Gross per 24 hour   Intake            397.5 ml   Output                0 ml   Net            397.5 ml       General:  Awake, confused  Cardiovascular:  S1S2+, RRR  Pulmonary:  Coarse bs b/l  GI:  Soft, BS+, NT, ND  Extremities:  trace edema      Labs:    Recent Results (from the past 24 hour(s))   VANCOMYCIN, RANDOM    Collection Time: 17  4:30 AM   Result Value Ref Range    Vancomycin, random 6.9 5.0 - 14.5 UG/ML   METABOLIC PANEL, BASIC    Collection Time: 17  4:30 AM   Result Value Ref Range    Sodium 135 (L) 136 - 145 mmol/L    Potassium 3.8 3.5 - 5.5 mmol/L    Chloride 103 100 - 108 mmol/L    CO2 23 21 - 32 mmol/L    Anion gap 9 3.0 - 18 mmol/L    Glucose 89 74 - 99 mg/dL    BUN 32 (H) 7.0 - 18 MG/DL    Creatinine 0.55 (L) 0.6 - 1.3 MG/DL    BUN/Creatinine ratio 58 (H) 12 - 20      GFR est AA >60 >60 ml/min/1.73m2    GFR est non-AA >60 >60 ml/min/1.73m2    Calcium 8.2 (L) 8.5 - 10.1 MG/DL   CBC WITH AUTOMATED DIFF    Collection Time: 12/16/17  4:30 AM   Result Value Ref Range    WBC 4.6 4.6 - 13.2 K/uL    RBC 3.48 (L) 4.20 - 5.30 M/uL    HGB 10.5 (L) 12.0 - 16.0 g/dL    HCT 32.3 (L) 35.0 - 45.0 %    MCV 92.8 74.0 - 97.0 FL    MCH 30.2 24.0 - 34.0 PG    MCHC 32.5 31.0 - 37.0 g/dL    RDW 15.9 (H) 11.6 - 14.5 %    PLATELET 910 388 - 132 K/uL    MPV 9.2 9.2 - 11.8 FL    NEUTROPHILS 61 40 - 73 %    LYMPHOCYTES 23 21 - 52 %    MONOCYTES 16 (H) 3 - 10 %    EOSINOPHILS 0 0 - 5 %    BASOPHILS 0 0 - 2 %    ABS. NEUTROPHILS 2.8 1.8 - 8.0 K/UL    ABS. LYMPHOCYTES 1.1 0.9 - 3.6 K/UL    ABS. MONOCYTES 0.7 0.05 - 1.2 K/UL    ABS. EOSINOPHILS 0.0 0.0 - 0.4 K/UL    ABS.  BASOPHILS 0.0 0.0 - 0.1 K/UL    DF AUTOMATED         Signed By: Everton Mccann MD     December 16, 2017 12:31 PM

## 2017-12-16 NOTE — ROUTINE PROCESS
Bedside shift change report given to [place] (oncoming nurse) by Kathy Romero RN   (offgoing nurse). Report included the following information SBAR, Kardex and MAR.

## 2017-12-17 NOTE — PROGRESS NOTES
Orders received and chart reviewed. Attempted PT evaluation, pt did not participate and unable to follow directions or track PT. Was able to complete screen of passive range of motion in all extremities with increased tone in bilateral LEs. Will discontinue PT orders, pt not appropriate for PT at this time. Please reorder if change in functional status.  Thank you for this referral.     Wang Rai, PT

## 2017-12-17 NOTE — PROGRESS NOTES
Had spoken to pharmacist Cheryl Ashton re: Vanco trough = 9.4 ug/ mL     . He said to continue the same dose.

## 2017-12-17 NOTE — PROGRESS NOTES
Problem: Falls - Risk of  Goal: *Absence of Falls  Document Simran Fall Risk and appropriate interventions in the flowsheet.    Outcome: Progressing Towards Goal  Fall Risk Interventions:       Mentation Interventions: Bed/chair exit alarm    Medication Interventions: Bed/chair exit alarm    Elimination Interventions: Bed/chair exit alarm, Call light in reach    History of Falls Interventions: Bed/chair exit alarm, Door open when patient unattended, Room close to nurse's station

## 2017-12-17 NOTE — ROUTINE PROCESS
Bedside and Verbal shift change report given to Stan Maher  (oncoming nurse) by Chayito Adam RN (offgoing nurse). Report included the following information SBAR, Kardex, MAR and Recent Results. SITUATION:    Code Status: DNR   Reason for Admission: Pneumonia    [facility] day: 1   Problem List:       Hospital Problems  Date Reviewed: 11/19/2017          Codes Class Noted POA    Pneumonia ICD-10-CM: J18.9  ICD-9-CM: 486  12/15/2017 Unknown              BACKGROUND:    Past Medical History:   Past Medical History:   Diagnosis Date    Acute blood loss as cause of postoperative anemia 11/20/2017    Do not resuscitate status 11/19/2017    Heme positive stool 11/26/2017    History of syncope     Severe aortic stenosis by prior echocardiogram 8/1/2017    Severe mitral regurgitation by prior echocardiogram 8/1/2017         Patient taking anticoagulants pt's family refuses.     ASSESSMENT:    Changes in Assessment Throughout Shift: no     Patient has Central Line: no Reasons if yes: n/a   Patient has Reed Cath: no Reasons if yes: n/a      Last Vitals:     Vitals:    12/16/17 0400 12/16/17 0911 12/16/17 1147 12/16/17 1617   BP: 119/51 145/79 160/72 149/85   Pulse: (!) 51 74  91   Resp: 18 18 18 20   Temp: 98 °F (36.7 °C) 97.2 °F (36.2 °C) 97.6 °F (36.4 °C) 96.8 °F (36 °C)   SpO2: 95% 97% 97% 94%   Weight:       Height:            IV and DRAINS (will only show if present)   Peripheral IV 12/15/17 Left Hand-Site Assessment: Clean, dry, & intact  Peripheral IV 12/15/17 Left Wrist-Site Assessment: Clean, dry, & intact  Peripheral IV 12/15/17 Left Antecubital-Site Assessment: Clean, dry, & intact     WOUND (if present)   Wound Type:  unstageable lt heel   Dressing present: no   Wound Concerns/Notes:  boots     PAIN    Pain Assessment    Pain Intensity 1: 0 (12/16/17 1618)              Patient Stated Pain Goal: 0  o Interventions for Pain:  none  o Intervention effective: n/a  o Time of last intervention: none  o Reassessment Completed: n/a     Last 3 Weights:  Last 3 Recorded Weights in this Encounter    12/15/17 1143 12/15/17 1146   Weight: 44 kg (97 lb) 38.1 kg (83 lb 15.9 oz)     Weight change:      INTAKE/OUPUT    Current Shift:      Last three shifts: 12/15 0701 - 12/16 1900  In: 397.5 [I.V.:397.5]  Out: -      LAB RESULTS     Recent Labs      12/16/17   0430  12/15/17   1130   WBC  4.6  6.2   HGB  10.5*  12.8   HCT  32.3*  38.2   PLT  168  204        Recent Labs      12/16/17   0430  12/15/17   1130   NA  135*  131*   K  3.8  4.0   GLU  89  137*   BUN  32*  32*   CREA  0.55*  0.84   CA  8.2*  8.8       RECOMMENDATIONS AND DISCHARGE PLANNING     1. Pending tests/procedures/ Plan of Care or Other Needs: PT/OT/ ST     2. Discharge plan for patient and Needs/Barriers: TBD    3. Estimated Discharge Date: TBD Posted on Whiteboard in Patients Room: yes      4. The patient's care plan was reviewed with the oncoming nurse. \"HEALS\" SAFETY CHECK      Fall Risk    Total Score: 4    Safety Measures: Safety Measures: Bed/Chair alarm on, Bed in low position, Call light within reach    A safety check occurred in the patient's room between off going nurse and oncoming nurse listed above. The safety check included the below items  Area Items   H  High Alert Medications - Verify all high alert medication drips (heparin, PCA, etc.)   E  Equipment - Suction is set up for ALL patients (with yanker)  - Red plugs utilized for all equipment (IV pumps, etc.)  - WOWs wiped down at end of shift.  - Room stocked with oxygen, suction, and other unit-specific supplies   A  Alarms - Bed alarm is set for fall risk patients  - Ensure chair alarm is in place and activated if patient is up in a chair   L  Lines - Check IV for any infiltration  - Reed bag is empty if patient has a Reed   - Tubing and IV bags are labeled   S  Safety   - Room is clean, patient is clean, and equipment is clean.   - Hallways are clear from equipment besides carts. - Fall bracelet on for fall risk patients  - Ensure room is clear and free of clutter  - Suction is set up for ALL patients (with erickson)  - Hallways are clear from equipment besides carts.    - Isolation precautions followed, supplies available outside room, sign posted     Alexsander Booth RN

## 2017-12-17 NOTE — PROGRESS NOTES
SLP orders received. Pt completed MBS as an outpatient at this facility on 12/13/17 with recs of puree diet and thin liquids, meds crushed. Would recommend above stated diet at this time. SLP to f/u tomorrow or as medical condition permits for evaluation.      Thank you for this referral.    Froylan Youssef M.S. CCC-SLP/L  Speech-Language Pathologist

## 2017-12-17 NOTE — PROGRESS NOTES
Problem: Dysphagia (Adult)  Goal: *Acute Goals and Plan of Care (Insert Text)  Patient will:  1. Tolerate PO trials with 0 s/s overt distress in 4/5 trials  2. Utilize compensatory swallow strategies/maneuvers (decrease bite/sip, size/rate, alt. liq/sol) with min cues in 4/5 trials    Rec:     Puree with thin liquids  WHEN ALERT ONLY  Aspiration precautions  HOB >45 during po intake, remain >30 for 30-45 minutes after po   Small bites/sips; alternate liquid/solid with slow feeding rate   Oral care TID  Meds crushed               Speech LAnguage Pathology bedside swallow evaluation    Patient: Jacob Arnold (80 y.o. female)  Date: 12/17/2017  Primary Diagnosis: Pneumonia        Precautions: aspiration       ASSESSMENT :  Based on the objective data described below, the patient presents with mild-mod oral dysphagia. Pt alert, unable to track SLP in room throughout evaluation. She completed a OP MBS at this facility on 12/13/17 with the following results: \"mild oropharyngeal dysphagia. Pt tolerated puree and thin liquids + straw without any aspiration/penetration events. Mildly decreased orolingual strength resulted in increased bolus manipulation and delayed a-p transit. Premature spillage was noted with all PO as well as mild vallecular residue secondary to decreased tongue base retraction. Pt with poor endurance and fell asleep throughout study. She required mod-max cues to redirect towards task from both family support and SLP. Laryngeal elevation and pharyngeal motility/sensation WFL for pt's age. No further PO given at this time secondary to lethargy. \" This AM, pt accepting to thin liquid trials via straw without any overt s/sx of aspiration and timely laryngeal elevation. Pursed lips to all further PO presentations. Rec puree diet with thin liquids, when alert only, meds crushed, aspiration precautions, and oral care TID. Pt will require assistance with PO. D/w RN. ST will continue to follow.      Patient will benefit from skilled intervention to address the above impairments. Patients rehabilitation potential is considered to be Fair  Factors which may influence rehabilitation potential include:   [x]            Mental ability/status  [x]            Medical condition     PLAN :  Recommendations and Planned Interventions: See above  Frequency/Duration: Patient will be followed by speech-language pathology 1-2 times per day/3-5 days per week to address goals. Discharge Recommendations: O[facility] L[facility] and To Be Determined     SUBJECTIVE:   Patient aphonic throughout evaluation.     OBJECTIVE:     Past Medical History:   Diagnosis Date    Acute blood loss as cause of postoperative anemia 11/20/2017    Do not resuscitate status 11/19/2017    Heme positive stool 11/26/2017    History of syncope     Severe aortic stenosis by prior echocardiogram 8/1/2017    Severe mitral regurgitation by prior echocardiogram 8/1/2017     Past Surgical History:   Procedure Laterality Date    HX CHOLECYSTECTOMY  6/2015    HX HEMORRHOIDECTOMY      HX HERNIA REPAIR  06/2015    HX HIP FRACTURE TX Left 11/19/2017    S/P Open reduction internal fixation of left displaced 4-part intertrochanteric femur fracture with intramedullary nail (11/19/2017 - Elissa Melendez)    HX HYSTERECTOMY       Prior Level of Function/Home Situation: LTC  Home Situation  Home Environment: Long term care  One/Two Story Residence: One story  Living Alone: No  Support Systems: Family member(s)  Patient Expects to be Discharged to[de-identified] Skilled nursing facility  Current DME Used/Available at Home: Other (comment)  Diet prior to admission: puree with thin per previous SLP recs  Current Diet:  Puree with thin   Cognitive and Communication Status:  Neurologic State: Alert  Orientation Level: Unable to verbalize  Cognition: No command following   Oral Assessment:  Oral Assessment  Labial: Decreased seal;Decreased rate  Dentition: Natural  Oral Hygiene: Adequate  Lingual: Decreased rate;Decreased strength (per MBS)  Velum: Unable to visualize  Mandible: No impairment  P.O. Trials:  Patient Position: [facility] at [facility]  Vocal quality prior to P.O.: No impairment  Consistency Presented: Thin liquid;Puree  How Presented: Cup/sip;Straw  Bolus Acceptance: Impaired  Bolus Formation/Control: Impaired  Type of Impairment: Anterior;Delayed;Poor;Posterior; Incomplete;Lip closure  Propulsion: No impairment  Oral Residue: None  Initiation of Swallow: No impairment  Laryngeal Elevation: Functional  Aspiration Signs/Symptoms: None  Pharyngeal Phase Characteristics: No impairment, issues, or problems   Effective Modifications: None  Cues for Modifications: Maximal     Oral Phase Severity: Mild-moderate  Pharyngeal Phase Severity : No impairment    GCODESwallowing:  Swallow Current Status CJ= 20-39%   Swallow Goal Status CI= 1-19%    The severity rating is based on the following outcomes:    Clinical Judgment    Pain:  Pt c/o 0/10 pain prior to evaluation. Pt c/o 0/10 pain post evaluation. After treatment:   []            Patient left in no apparent distress sitting up in chair  [x]            Patient left in no apparent distress in bed  [x]            Call bell left within reach  [x]            Nursing notified  []            Caregiver present  []            Bed alarm activated    COMMUNICATION/EDUCATION:   [x]            SLP educated pt on diet recs. Suspect poor comp. No family at bedside. [x]            Patient is unable to participate in goal setting and plan of care.     Thank you for this referral.    Ron Rodriguez M.S. CCC-SLP/L  Speech-Language Pathologist

## 2017-12-17 NOTE — PROGRESS NOTES
Family requested presence of  for prayer. Present were two daughters, two granddaughters and great granddaughter.      Chaplain Aditi Marroquin

## 2017-12-17 NOTE — PROGRESS NOTES
[facility] Hospitalist Group  Progress Note    Patient: Roxie Castellon Age: 80 y.o. : 4/3/1925 MR#: [id number] SSN: [id number]  Date: 2017     Subjective:     Pt does not follow commands. discussed with the family at bed side. They want hospice care. Assessment/Plan:     1. Acute metabolic encephalopathy  2. Pneumonia, Left lower lobe consolidation, HCAP  3. Left hip fracture s/p ORIF on 17 by Dr Chester Patino  4. Severe Aortic stenosis, EDUARDO 0.74 on 17   5. Moderate protein calorie malnutrition    PLAN  ; She is now DNR/DNI/ await palliative care consult for hospice care. Continue IV Cefepime and Levaquin and dc vanco.  Continue IV fluids. Overall very poor prognosis. Case discussed with:  []Patient  [x]Family  []Nursing  []Case Management  DVT Prophylaxis:  []Lovenox  [x]Hep SQ  []SCDs  []Coumadin   []On Heparin gtt    Objective:   VS:   Visit Vitals    /67 (BP 1 Location: Right arm, BP Patient Position: At rest)    Pulse 73    Temp 96.8 °F (36 °C)    Resp 18    Ht 5' 1\" (1.549 m)    Wt 44 kg (97 lb 1.6 oz)    SpO2 100%    BMI 18.35 kg/m2      Tmax/24hrs: Temp (24hrs), Av °F (36.1 °C), Min:96.8 °F (36 °C), Max:97.6 °F (36.4 °C)      Intake/Output Summary (Last 24 hours) at 17 1401  Last data filed at 17 0700   Gross per 24 hour   Intake          1294.17 ml   Output              450 ml   Net           844.17 ml       General: pt is unresponsive.   Cardiovascular:  S1S2+, RRR  Pulmonary:  Coarse bs b/l  GI:  Soft, BS+, NT, ND  Extremities:  trace edema      Labs:    Recent Results (from the past 24 hour(s))   VANCOMYCIN, TROUGH    Collection Time: 17  4:46 AM   Result Value Ref Range    Vancomycin,trough 9.4 (L) 10.0 - 20.0 ug/mL    Reported dose date: 2017      Reported dose time: 1100      Reported dose: 750 MG UNITS   CBC WITH AUTOMATED DIFF    Collection Time: 17  4:46 AM   Result Value Ref Range    WBC 5.9 4.6 - 13.2 K/uL    RBC 4.53 4.20 - 5.30 M/uL    HGB 13.9 12.0 - 16.0 g/dL    HCT 42.2 35.0 - 45.0 %    MCV 93.2 74.0 - 97.0 FL    MCH 30.7 24.0 - 34.0 PG    MCHC 32.9 31.0 - 37.0 g/dL    RDW 15.6 (H) 11.6 - 14.5 %    PLATELET 529 934 - 280 K/uL    MPV 9.2 9.2 - 11.8 FL    NEUTROPHILS 69 40 - 73 %    LYMPHOCYTES 16 (L) 21 - 52 %    MONOCYTES 13 (H) 3 - 10 %    EOSINOPHILS 1 0 - 5 %    BASOPHILS 1 0 - 2 %    ABS. NEUTROPHILS 4.1 1.8 - 8.0 K/UL    ABS. LYMPHOCYTES 0.9 0.9 - 3.6 K/UL    ABS. MONOCYTES 0.8 0.05 - 1.2 K/UL    ABS. EOSINOPHILS 0.1 0.0 - 0.4 K/UL    ABS.  BASOPHILS 0.0 0.0 - 0.1 K/UL    DF AUTOMATED     PROTHROMBIN TIME + INR    Collection Time: 12/17/17  4:46 AM   Result Value Ref Range    Prothrombin time 13.4 11.5 - 15.2 sec    INR 1.1 0.8 - 1.2     METABOLIC PANEL, BASIC    Collection Time: 12/17/17  4:46 AM   Result Value Ref Range    Sodium 134 (L) 136 - 145 mmol/L    Potassium 3.7 3.5 - 5.5 mmol/L    Chloride 98 (L) 100 - 108 mmol/L    CO2 24 21 - 32 mmol/L    Anion gap 12 3.0 - 18 mmol/L    Glucose 58 (L) 74 - 99 mg/dL    BUN 22 (H) 7.0 - 18 MG/DL    Creatinine 0.40 (L) 0.6 - 1.3 MG/DL    BUN/Creatinine ratio 55 (H) 12 - 20      GFR est AA >60 >60 ml/min/1.73m2    GFR est non-AA >60 >60 ml/min/1.73m2    Calcium 8.6 8.5 - 10.1 MG/DL   MAGNESIUM    Collection Time: 12/17/17  4:46 AM   Result Value Ref Range    Magnesium 1.9 1.6 - 2.6 mg/dL       Signed By: Lawyer Tiesha MD     December 17, 2017 12:31 PM

## 2017-12-17 NOTE — ROUTINE PROCESS
Bedside and Verbal shift change report given to Carolann Ramos RN  (oncoming nurse) by Ling Sanchez RN (offgoing nurse). Report included the following information SBAR, Kardex, MAR and Recent Results. SITUATION:    Code Status: DNR   Reason for Admission: Pneumonia    [facility] day: 2   Problem List:       Hospital Problems  Date Reviewed: 11/19/2017          Codes Class Noted POA    Pneumonia ICD-10-CM: J18.9  ICD-9-CM: 486  12/15/2017 Unknown              BACKGROUND:    Past Medical History:   Past Medical History:   Diagnosis Date    Acute blood loss as cause of postoperative anemia 11/20/2017    Do not resuscitate status 11/19/2017    Heme positive stool 11/26/2017    History of syncope     Severe aortic stenosis by prior echocardiogram 8/1/2017    Severe mitral regurgitation by prior echocardiogram 8/1/2017         Patient taking anticoagulants pt's family refuses.     ASSESSMENT:    Changes in Assessment Throughout Shift: no     Patient has Central Line: no Reasons if yes: n/a   Patient has Reed Cath: no Reasons if yes: n/a      Last Vitals:     Vitals:    12/16/17 2033 12/16/17 2114 12/17/17 0015 12/17/17 0408   BP:  168/81 163/81 168/79   Pulse:  88 76 72   Resp:  16 16 16   Temp:  96.8 °F (36 °C) 97 °F (36.1 °C) 97.6 °F (36.4 °C)   SpO2: 94% 95% 94% 95%   Weight:  44 kg (97 lb 1.6 oz)     Height:            IV and DRAINS (will only show if present)   Peripheral IV 12/15/17 Left Hand-Site Assessment: Clean, dry, & intact  Peripheral IV 12/15/17 Left Wrist-Site Assessment: Clean, dry, & intact  Peripheral IV 12/15/17 Left Antecubital-Site Assessment: Clean, dry, & intact  External Female Catheter 12/17/17-Site Assessment: Clean, dry, & intact     WOUND (if present)   Wound Type:  unstageable lt heel   Dressing present: no   Wound Concerns/Notes:  boots     PAIN    Pain Assessment    Pain Intensity 1: 0 (12/17/17 0015)              Patient Stated Pain Goal: Unable to verbalize/indicatate pain  o Interventions for Pain:  none  o Intervention effective: n/a  o Time of last intervention: none  o Reassessment Completed: n/a     Last 3 Weights:  Last 3 Recorded Weights in this Encounter    12/15/17 1143 12/15/17 1146 12/16/17 2114   Weight: 44 kg (97 lb) 38.1 kg (83 lb 15.9 oz) 44 kg (97 lb 1.6 oz)     Weight change: 0.045 kg (1.6 oz)     INTAKE/OUPUT    Current Shift:      Last three shifts: 12/15 1901 - 12/17 0700  In: 844.2 [I.V.:844.2]  Out: -      LAB RESULTS     Recent Labs      12/17/17   0446  12/16/17   0430  12/15/17   1130   WBC  5.9  4.6  6.2   HGB  13.9  10.5*  12.8   HCT  42.2  32.3*  38.2   PLT  171  168  204        Recent Labs      12/17/17   0446  12/16/17   0430  12/15/17   1130   NA  134*  135*  131*   K  3.7  3.8  4.0   GLU  58*  89  137*   BUN  22*  32*  32*   CREA  0.40*  0.55*  0.84   CA  8.6  8.2*  8.8   MG  1.9   --    --    INR  1.1   --    --        RECOMMENDATIONS AND DISCHARGE PLANNING     1. Pending tests/procedures/ Plan of Care or Other Needs: PT/OT/ ST     2. Discharge plan for patient and Needs/Barriers: TBD    3. Estimated Discharge Date: TBD Posted on Whiteboard in Patients Room: yes      4. The patient's care plan was reviewed with the oncoming nurse. \"HEALS\" SAFETY CHECK      Fall Risk    Total Score: 4    Safety Measures: Safety Measures: Bed/Chair alarm on, Bed/Chair-Wheels locked, Bed in low position, Call light within reach, Fall prevention (comment), Side rails X 3    A safety check occurred in the patient's room between off going nurse and oncoming nurse listed above.     The safety check included the below items  Area Items   H  High Alert Medications - Verify all high alert medication drips (heparin, PCA, etc.)   E  Equipment - Suction is set up for ALL patients (with yanker)  - Red plugs utilized for all equipment (IV pumps, etc.)  - WOWs wiped down at end of shift.  - Room stocked with oxygen, suction, and other unit-specific supplies   A  Alarms - Bed alarm is set for fall risk patients  - Ensure chair alarm is in place and activated if patient is up in a chair   L  Lines - Check IV for any infiltration  - Reed bag is empty if patient has a Reed   - Tubing and IV bags are labeled   S  Safety   - Room is clean, patient is clean, and equipment is clean. - Hallways are clear from equipment besides carts. - Fall bracelet on for fall risk patients  - Ensure room is clear and free of clutter  - Suction is set up for ALL patients (with yanker)  - Hallways are clear from equipment besides carts.    - Isolation precautions followed, supplies available outside room, sign posted     Mikey Walton RN

## 2017-12-18 NOTE — CDMP QUERY
There is conflicting documentation related to malnutrition. Attending is documenting moderate but dietitian is documenting severe. Please clarify if you concur with dietitian. Please clarify if this patient is being treated/managed for:    => Severe protein calorie malnutrition  or  => Moderate protein calorie malnutrition  => Other Explanation of clinical findings  => Unable to Determine (no explanation of clinical findings)    The medical record reflects the following:  Risk:  -- 80year old with recent hip surgery, recent diagnosis of pneumonia    Clinical Indicators:  -- dietitian progress notes:      - Severe Protein Calorie Malnutrition as evidenced by:   ASPEN Malnutrition Criteria  Acute Illness, Chronic Illness, or Social/Enviornmental: Acute illness  Energy Intake: Less than/equal to 50% est energy req for greater than/equal to 5 days  Body Fat: Moderate (triceps)  ASPEN Malnutrition Score - Acute Illness: 12  Acute Illness - Malnutrition Diagnosis: Severe malnutrition      - Average po intake adequate to meet patients estimated nutritional needs:  No       - Body mass index is 15.87     Treatment:   -- dietitian following:      - Add supplement: Ensure Enlive TID      - Downgrade diet consistency to pureed per SLP recommendation on 12/13/17      - Modify diet order to include \"no straws\" per family report      - Encourage and monitor po intake      - Assistance with meals as needed      - Re-check pt's weight      - Continue RD inpatient monitoring and evaluation. Please clarify and document your clinical opinion in the progress notes and discharge summary including the definitive and/or presumptive diagnosis, (suspected or probable), related to the above clinical findings. Please include clinical findings supporting your diagnosis. If you DECLINE this query or would like to communicate with [facility], please utilize the \"[facility] message box\" at the TOP of the Progress Note on the right.       Thank you,  Rolo Laguna [facility], [address] [place] [address]

## 2017-12-18 NOTE — PROGRESS NOTES
NUTRITION    Nursing Referral: Pressure Injury  Nutrition Consult: General Nutrition Management & Supplements      RECOMMENDATIONS / PLAN:     - Provide nutrition interventions consistent with plan of care goals for patient; comfort measures  - Continue diet and supplements for comfort; decreased supplements to once daily  - Continue RD inpatient monitoring and evaluation. NUTRITION INTERVENTIONS & DIAGNOSIS:     [x] Meals/Snacks: modified diet  [x] Medical food supplementation: Ensure Enlive TID    Nutrition Diagnosis:  Inadequate oral intake related to decreased appetite as evidenced by pt consuming 1 meal per day x 2-3 weeks PTA. Increased nutrient needs (protein) related to promotion of wound healing as evidenced by pressure injury    Patient meets criteria for Severe Protein Calorie Malnutrition as evidenced by:   ASPEN Malnutrition Criteria  Acute Illness, Chronic Illness, or Social/Enviornmental: Acute illness  Energy Intake: Less than/equal to 50% est energy req for greater than/equal to 5 days  Body Fat: Moderate (triceps)  ASPEN Malnutrition Score - Acute Illness: 12  Acute Illness - Malnutrition Diagnosis: Severe malnutrition. ASSESSMENT:     11/18: Pt re-weighed on 12/16; consistent with family report of patient's weight PTA. Not eating. Remains lethargic and not responding. Family met with Palliative today; pt transitioned to comfort measures    11/16: Pt lethargic at time of visit; not eating dinner meal. Family present; report poor po intake x 2-3 weeks PTA, pt consuming 1 meal per day during that time. Needs assistance with meals.  S/p MBS on 12/13/17; SLP recommended pureed diet with thin liquids    Average po intake adequate to meet patients estimated nutritional needs:   [] Yes     [x] No   [] Unable to determine at this time    Diet: DIET NUTRITIONAL SUPPLEMENTS Breakfast, Lunch, Dinner; ENSURE ENLIVE  DIET DYSPHAGIA PUREED (NDD1)  DIET ONE TIME MESSAGE      Food Allergies: none known  Current Appetite:   [] Good     [] Fair     [] Poor     [x] Other: lethargic  Appetite/meal intake prior to admission:   [] Good     [] Fair     [x] Poor x 2-3 weeks PTA    [] Other:  Feeding Limitations:  [x] Swallowing difficulty: SLP following    [] Chewing difficulty    [x] Other: per family report, pt had recent MBS and passed, but no straws allowed. Per chart hx, pt s/p MBS on 12/13/17 with SLP recommendation of purred diet and thin liquids  Current Meal Intake:   Patient Vitals for the past 100 hrs:   % Diet Eaten   12/18/17 1247 0 %   12/18/17 0900 0 %       BM:  Unknown   Skin Integrity: stage II left heel pressure injury  Edema: none  Pertinent Medications: Reviewed    Recent Labs      12/18/17   0505  12/17/17   0446  12/16/17   0430   NA  136  134*  135*   K  3.7  3.7  3.8   CL  105  98*  103   CO2  23  24  23   GLU  132*  58*  89   BUN  38*  22*  32*   CREA  0.49*  0.40*  0.55*   CA  8.5  8.6  8.2*   MG   --   1.9   --        Intake/Output Summary (Last 24 hours) at 12/18/17 1729  Last data filed at 12/18/17 1247   Gross per 24 hour   Intake                0 ml   Output             1925 ml   Net            -1925 ml       Anthropometrics:  Ht Readings from Last 1 Encounters:   12/15/17 5' 1\" (1.549 m)     Last 3 Recorded Weights in this Encounter    12/15/17 1143 12/15/17 1146 12/16/17 2114   Weight: 44 kg (97 lb) 38.1 kg (83 lb 15.9 oz) 44 kg (97 lb 1.6 oz)     Body mass index is 18.35 kg/(m^2). Weight History:  Family reported pt weighing 97 lb about 1 week PTA.       Weight Metrics 12/16/2017 11/19/2017 8/8/2017 8/1/2017 6/28/2017 12/10/2015 6/29/2015   Weight 97 lb 1.6 oz 97 lb 90 lb 90 lb 99 lb 98 lb 94 lb   BMI 18.35 kg/m2 18.94 kg/m2 17.58 kg/m2 17.58 kg/m2 19.33 kg/m2 18.53 kg/m2 17.77 kg/m2        Admitting Diagnosis: Pneumonia  Pertinent PMHx:  Dysphagia    Education Needs:        [x] None identified  [] Identified - Not appropriate at this time  []  Identified and addressed - refer to education log  Learning Limitations:   [] None identified  [x] Identified: lethargy    Cultural, Mu-ism & ethnic food preferences:  [x] None identified    [] Identified and addressed     ESTIMATED NUTRITION NEEDS:     Calories: 8499-6329 kcal (35-40 kcal/kg) based on  [x] Actual BW: 38 kg      [] IBW   Protein: 46-57 gm (1.2-1.5 gm/kg) based on  [x] Actual BW      [] IBW   Fluid: 1 mL/kcal     MONITORING & EVALUATION:     Nutrition Goal(s):  Goals modified  1. Po intake of meals will meet >75% of patient estimated nutritional needs within the next 7 days. Outcome:  [] Met/Ongoing    [x]  Not Met    [] New/Initial Goal   2. Provide nutrition intervention as appropriate with goals of care for the next 7 days.  Outcome:  [] Met/Ongoing    []  Not Met    [x] New/Initial Goal     Monitoring:   [x] Diet tolerance   [x] Meal intake   [x] Supplement intake   [] GI symptoms/ability to tolerate po diet   [] Respiratory status   [] Plan of care      Previous Recommendations (for follow-up assessments only):     [x]   Implemented       []   Not Implemented (RD to address)      [] No Longer Appropriate     [] No Recommendation Made     Discharge Planning: regular diet; consistency per SLP  [x] Participated in care planning, discharge planning, & interdisciplinary rounds as appropriate      Jerlean Homans, [address] [place] [address]   Pager: [phone]

## 2017-12-18 NOTE — ACP (ADVANCE CARE PLANNING)
Last [address] (Physician Orders for Scope of Treatment)       Date of conversation: 12-18-17  Location:[facility]  Length (minutes): 30    Participants:   [address] agent (already designated in existing ACP document)    Name: Verónica Polo    Relationship to Patient:daughter       Other persons present: Patient neice      Last [employer] ACP Facilitator: Jg Terrell NP      Conversation Topics   (If Patient does not have decision making capacity, Agent/Surrogate responds based on understanding of how patient would respond if capable)    Understanding of Medical Condition/s AND Potential Complications:      Healthcare Agent/Other Surrogate: states that she feels patient is ready for hospice    Cultural, Mormon, spiritual, or personal beliefs described as: Yazdanism    Needs to discuss with spiritual/Mormon advisor: [] Yes  [x] No    Needs more information about illness and complications:  [] Yes  [x] No      Cardiopulmonary Resuscitation        Order Elected for CPR:  []  Attempt Resuscitation [x]  Do Not Attempt Resuscitation      When NOT in Cardiopulmonary Arrest, Order Elected:      [x] Comfort Measures  [] Limited Additional Interventions  [] Full Interventions    Artificially Administered Nutrition, Order Elected:    [x] No Feeding Tube   [] Feeding Tube for a defined trial period  [] Feeding Tube long-term if indicated    Meeting Outcomes:   [] ACP discussion completed   [x] Colleen form completed  [] [place] prepared for Provider review and signature   [x] Original placed on Chart, if in facility (form to be sent with patient at discharge)  [x] Copy given to healthcare agent    [] Copy scanned to electronic medical record

## 2017-12-18 NOTE — INTERDISCIPLINARY ROUNDS
Interdisciplinary Round Note   Patient Information:   Sabine Tobias   463/36   Reason for Admission: Pneumonia   Attending Provider:   Sheyla Terrell MD  Primary Care Physician:       Mick Mao MD       [phone]   Estimated discharge date:  TBD   Hospital day: 3  [unfilled]  - - -  RRAT Score: Low Risk            12       Total Score        3 Has Seen PCP in Last 6 Months (Yes=3, No=0)    4 IP Visits Last 12 Months (1-3=4, 4=9, >4=11)    5 Pt. Coverage (Medicare=5 , Medicaid, or Self-Pay=4)        Criteria that do not apply:    . Living with Significant Other. Assisted Living. LTAC. SNF. or   Rehab    Patient Length of Stay (>5 days = 3)    Charlson Comorbidity Score (Age + Comorbid Conditions)            No         Chemical      Lines, Drains, & Airways  Peripheral IV lines       IV Antibiotics:    Current Antimicrobial Therapy (168h ago through future)    Ordered     Start Stop    12/17/17 1320  levoFLOXacin (LEVAQUIN) 500 mg in D5W IVPB  500 mg,   IntraVENous,   EVERY 48 HOURS      12/19/17 1200 --    12/15/17 1145  cefepime (MAXIPIME) 2 g in sterile water (preservative free) 10 mL IV syringe  2 g,   IntraVENous,   EVERY 12 HOURS      12/15/17 1147 --        GI Prophylaxis: GI Prophylaxis: no   Type:       Recent Glucose Results:   Lab Results   Component Value Date/Time    GLU 58 (L) 12/17/2017 04:46 AM      Activity Level: Activity Level: Bed Rest    Needs assistance with ADLs: no       Goals for Today: Keep Comfortable. Recommendations:   Discharge Disposition: TBD, pending progress  Hospice Care Requested by by Family. Hospice Care Requested by Family.     Needs for Discharge: Hospice Care Requested by Family IDR Team:   Recommendations from [address] [place] [address] team:     Other Notes:

## 2017-12-18 NOTE — WOUND CARE
Physical Exam   Room 467: wound assessment  Wound Heel Left (Active) red flattened blister POA   DRESSING TYPE Open to air 12/18/2017 10:33 AM   Pressure Injury Stage 2 12/18/2017 10:33 AM   Wound Length (cm) 2.5 cm 12/18/2017 10:33 AM   Wound Width (cm) 2 cm 12/18/2017 10:33 AM   Wound Depth (cm) 0 12/18/2017 10:33 AM   Wound Surface area (cm^3) 0 cm^2 12/18/2017 10:33 AM   Condition of Base Red flattened blister 12/18/2017 10:33 AM   Condition of Edges Closed 12/18/2017 10:33 AM   Epithelialization (%) 0 12/18/2017 10:33 AM   Tissue Type Red 12/18/2017 10:33 AM   Tissue Type Percent Red 100 12/18/2017 10:33 AM   Drainage Amount  None 12/18/2017 10:33 AM   Wound Odor None 12/18/2017 10:33 AM   Periwound Skin Condition Intact 12/18/2017 10:33 AM   Dressing Type Applied Open to air 12/18/2017 10:33 AM   Procedure Tolerated Well 12/18/2017 10:33 AM   Number of days:3       Pt unable to participate in wound care planning or education at this time. Pt has envision mattress, lift team, & prevalon boots. Leave blister open to air within boots. Will turn over care to nursing staff at this time.   Soledad QUEVEDON, RN, [facility] [place], [address]

## 2017-12-18 NOTE — CDMP QUERY
Please clarify if you concur with wound nurse. Please clarify if this patient is being treated/managed for:    => Pressure ulcer stage 2 blister to left heel POA  => Other Explanation of clinical findings  => Unable to Determine (no explanation of clinical findings)    The medical record reflects the following:  Risk:  -- recent hip surgery, recent pneumonia    Clinical Indicators:  -- wound care nurse: Pressure ulcer stage 2 blister to left heel POA    Treatment:   -- wound care nurse:  Pt unable to participate in wound care planning or education at this time. Pt has envision mattress, lift team, & prevalon boots. Leave blister open to air within boots. Will turn over care to nursing staff at this time. Please clarify and document your clinical opinion in the progress notes and discharge summary including the definitive and/or presumptive diagnosis, (suspected or probable), related to the above clinical findings. Please include clinical findings supporting your diagnosis. If you DECLINE this query or would like to communicate with [facility], please utilize the \"[employer] message box\" at the TOP of the Progress Note on the right.       Thank you,  Teddy Farah [address]

## 2017-12-18 NOTE — PROGRESS NOTES
Problem: Falls - Risk of  Goal: *Absence of Falls  Document Simran Fall Risk and appropriate interventions in the flowsheet.    Outcome: Progressing Towards Goal  Fall Risk Interventions:       Mentation Interventions: Bed/chair exit alarm    Medication Interventions: Bed/chair exit alarm    Elimination Interventions: Bed/chair exit alarm, Call light in reach    History of Falls Interventions: Bed/chair exit alarm, Door open when patient unattended

## 2017-12-18 NOTE — ACP (ADVANCE CARE PLANNING)
POST form was completed and signed by Ms Shannon Miller daughter Monica Guy and Palliative NP Beltran Xie, indicating DNR, comfort measures and no feeding tube. Copies were made and given to Yamileth and original was placed on chart to be scanned.

## 2017-12-18 NOTE — PROGRESS NOTES
FOC provided to this pt's family who chose [facility] and for this pt to return to [address] [place] [address] with hospice services with [facility]. Norm had all necessary DME delivered today, in readiness for discharge in the a.m. To [address] [place] [address] to start Hospice services.

## 2017-12-18 NOTE — PROGRESS NOTES
and Palliative NP Toni Gutierrez met with Ms Monty Reyes (nicknamed \"Sudha\") in her room. She appears to be uncomfortable and afraid. Her brow is furrowed. Her daughter Lamar Cobian and niece Cassidy Johnson are present at bedside and they requested that we meet in conference room at end of hallway on 4N.  offered listening support and compassion as Lamar Cobian and Cassidy Johnson shared a little about Ms Rosalba Bah decline over the past 8 months - sharing that she went to the beach with family and was driving back in April, that her spouse  in September, with hospice care at [facility] where they live. Lamar Cobian and Cassidy Johnson are both clear about honoring Ms Rosalba Bah wishes to transition to comfort measures. They desire for her to be transferred back to [facility] with [facility] if she is stable for transfer. POST form was completed and signed by Lamar Cobian and NP, indicating DNR, comfort measures and no feeding tube. Copies were made and given to Yamileth and original was placed on chart to be scanned.  asked about having a  visit for anointing and Lamar Cobian stated that Father Ajay Gonzalez had already administered the anointing of the sick.  offered a \"red\" handmade prayer blanket that had been blessed by Father Mariaelena Henry and the [facility].  read the prayer which came with the blanket. Family expressed their gratitude for support.  continued to offer support as family shared pictures, stories and other losses of the past year. Will continue to follow for support. Contact information was given.

## 2017-12-18 NOTE — ROUTINE PROCESS
Bedside and Verbal shift change report given to Florecita Becerril  (oncoming nurse) by Cecilia Cooper RN (offgoing nurse). Report included the following information SBAR, Kardex, MAR and Recent Results. SITUATION:    Code Status: DNR   Reason for Admission: Pneumonia    [facility] day: 3   Problem List:       Hospital Problems  Date Reviewed: 11/19/2017          Codes Class Noted POA    Pneumonia ICD-10-CM: J18.9  ICD-9-CM: 486  12/15/2017 Unknown              BACKGROUND:    Past Medical History:   Past Medical History:   Diagnosis Date    Acute blood loss as cause of postoperative anemia 11/20/2017    Do not resuscitate status 11/19/2017    Heme positive stool 11/26/2017    History of syncope     Severe aortic stenosis by prior echocardiogram 8/1/2017    Severe mitral regurgitation by prior echocardiogram 8/1/2017         Patient taking anticoagulants pt's family refuses.     ASSESSMENT:    Changes in Assessment Throughout Shift: no     Patient has Central Line: no Reasons if yes: n/a   Patient has Reed Cath: no Reasons if yes: n/a      Last Vitals:     Vitals:    12/17/17 1500 12/17/17 2030 12/18/17 0002 12/18/17 0400   BP: 125/78 128/70 142/78 146/82   Pulse: 86 89 91 90   Resp: 16 16 16 16   Temp: 96.6 °F (35.9 °C) 96.9 °F (36.1 °C) 97.2 °F (36.2 °C) 96.1 °F (35.6 °C)   SpO2: 97% 92% 95% 94%   Weight:       Height:            IV and DRAINS (will only show if present)   Peripheral IV 12/15/17 Left Hand-Site Assessment: Clean, dry, & intact  Peripheral IV 12/15/17 Left Wrist-Site Assessment: Clean, dry, & intact  [REMOVED] Peripheral IV 12/15/17 Left Antecubital-Site Assessment: Clean, dry, & intact  External Female Catheter 12/17/17-Site Assessment: Clean, dry, & intact     WOUND (if present)   Wound Type:  unstageable lt heel   Dressing present: no   Wound Concerns/Notes:  boots     PAIN    Pain Assessment    Pain Intensity 1: 0 (12/18/17 0348)              Patient Stated Pain Goal: Unable to verbalize/indicatate pain  o Interventions for Pain:  none  o Intervention effective: n/a  o Time of last intervention: none  o Reassessment Completed: n/a     Last 3 Weights:  Last 3 Recorded Weights in this Encounter    12/15/17 1143 12/15/17 1146 12/16/17 2114   Weight: 44 kg (97 lb) 38.1 kg (83 lb 15.9 oz) 44 kg (97 lb 1.6 oz)     Weight change:      INTAKE/OUPUT    Current Shift: 12/18 0701 - 12/18 1900  In: -   Out: 1400 [Urine:1400]    Last three shifts: 12/16 1901 - 12/18 0700  In: 710 [I.V.:710]  Out: 450 [Urine:450]     LAB RESULTS     Recent Labs      12/17/17   0446  12/16/17   0430  12/15/17   1130   WBC  5.9  4.6  6.2   HGB  13.9  10.5*  12.8   HCT  42.2  32.3*  38.2   PLT  171  168  204        Recent Labs      12/18/17   0505  12/17/17   0446  12/16/17   0430   NA  136  134*  135*   K  3.7  3.7  3.8   GLU  132*  58*  89   BUN  38*  22*  32*   CREA  0.49*  0.40*  0.55*   CA  8.5  8.6  8.2*   MG   --   1.9   --    INR   --   1.1   --        RECOMMENDATIONS AND DISCHARGE PLANNING     1. Pending tests/procedures/ Plan of Care or Other Needs: PT/OT/ ST     2. Discharge plan for patient and Needs/Barriers: TBD    3. Estimated Discharge Date: TBD Posted on Whiteboard in Patients Room: yes      4. The patient's care plan was reviewed with the oncoming nurse. \"HEALS\" SAFETY CHECK      Fall Risk    Total Score: 4    Safety Measures: Safety Measures: Bed in low position, Bed/Chair-Wheels locked, Bed/Chair alarm on, Call light within reach    A safety check occurred in the patient's room between off going nurse and oncoming nurse listed above.     The safety check included the below items  Area Items   H  High Alert Medications - Verify all high alert medication drips (heparin, PCA, etc.)   E  Equipment - Suction is set up for ALL patients (with erickson)  - Red plugs utilized for all equipment (IV pumps, etc.)  - WOWs wiped down at end of shift.  - Room stocked with oxygen, suction, and other unit-specific supplies   A  Alarms - Bed alarm is set for fall risk patients  - Ensure chair alarm is in place and activated if patient is up in a chair   L  Lines - Check IV for any infiltration  - Reed bag is empty if patient has a Reed   - Tubing and IV bags are labeled   S  Safety   - Room is clean, patient is clean, and equipment is clean. - Hallways are clear from equipment besides carts. - Fall bracelet on for fall risk patients  - Ensure room is clear and free of clutter  - Suction is set up for ALL patients (with yanker)  - Hallways are clear from equipment besides carts.    - Isolation precautions followed, supplies available outside room, sign posted     Autumn Valente, RN

## 2017-12-18 NOTE — PROGRESS NOTES
[facility] Hospitalist Group  Progress Note    Patient: Jacob Arnold Age: 80 y.o. : 4/3/1925 MR#: [id number] SSN: [id number]  Date: 2017     Subjective:   Events noted. Now pt is comfort measures. Still not responding. Assessment/Plan:     1. Acute metabolic encephalopathy  2. Pneumonia, Left lower lobe consolidation, HCAP  3. Left hip fracture s/p ORIF on 17 by Dr Sheyla Barroso  4. Severe Aortic stenosis, EDUARDO 0.74 on 17   5. Severe prot ein calorie malnutrition.    => Pressure ulcer stage 2 blister to left heel POA    Now comfort measures. Continue Morphine. Objective:   VS:   Visit Vitals    /71 (BP 1 Location: Right arm, BP Patient Position: At rest)    Pulse 89    Temp 97.9 °F (36.6 °C)    Resp 18    Ht 5' 1\" (1.549 m)    Wt 44 kg (97 lb 1.6 oz)    SpO2 93%    BMI 18.35 kg/m2      Tmax/24hrs: Temp (24hrs), Av °F (36.1 °C), Min:96.1 °F (35.6 °C), Max:97.9 °F (36.6 °C)      Intake/Output Summary (Last 24 hours) at 17 1324  Last data filed at 17 1247   Gross per 24 hour   Intake                0 ml   Output             1925 ml   Net            -1925 ml       General: pt is unresponsive.   Cardiovascular:  S1S2+, RRR  Pulmonary:  Coarse bs b/l  GI:  Soft, BS+, NT, ND  Extremities:  trace edema      Labs:    Recent Results (from the past 24 hour(s))   CULTURE, RESPIRATORY/SPUTUM/BRONCH W GRAM STAIN    Collection Time: 17  1:00 AM   Result Value Ref Range    Special Requests: NO SPECIAL REQUESTS      GRAM STAIN >25 WBC/lpf      GRAM STAIN >25 EPI/lpf      GRAM STAIN MUCUS PRESENT      GRAM STAIN FEW YEAST WITH PSEUDOHYPHAE      Culture result: PENDING    METABOLIC PANEL, BASIC    Collection Time: 17  5:05 AM   Result Value Ref Range    Sodium 136 136 - 145 mmol/L    Potassium 3.7 3.5 - 5.5 mmol/L    Chloride 105 100 - 108 mmol/L    CO2 23 21 - 32 mmol/L    Anion gap 8 3.0 - 18 mmol/L    Glucose 132 (H) 74 - 99 mg/dL    BUN 38 (H) 7.0 - 18 MG/DL    Creatinine 0.49 (L) 0.6 - 1.3 MG/DL    BUN/Creatinine ratio 78 (H) 12 - 20      GFR est AA >60 >60 ml/min/1.73m2    GFR est non-AA >60 >60 ml/min/1.73m2    Calcium 8.5 8.5 - 10.1 MG/DL       Signed By: Joanna Carrasco MD     December 18, 2017 12:31 PM

## 2017-12-18 NOTE — ROUTINE PROCESS
Bedside and Verbal shift change report given to Ines Sanders RN (oncoming nurse) by Germaine Vieyra RN   (offgoing nurse). Report included the following information SBAR, Kardex, Intake/Output and MAR.

## 2017-12-18 NOTE — CONSULTS
[facility]: [phone]  [facility]: [phone]   [facility]: [phone]    Patient Name: Deidre Esquivel  YOB: 1925    Date of Initial Consult: 17  Reason for Consult: care decisions  Requesting Provider: Dr. Indy Nelson   Primary Care Physician: Carrie Jeffrey MD      SUMMARY:   Deidre Esquivel is a 80y.o. year old with a past history of syncope, aortic stenosis, mitral regurgitation, left hip fracture/ORIF , who was admitted on 12/15/2017 from SNF with a diagnosis of acute metabolic encephalopathy and pneumonia. Current medical issues leading to Palliative Medicine involvement include: 79 y/o with hip fracture and ORIF last month, DC to SNF and never returned to baseline, now with PNA, family is interested in hospice. PALLIATIVE DIAGNOSES:   1. ACP/goals of care discussion  2. Comfort measures  3. Pneumonia  4. Debility         PLAN:   1. Met with patient, daughter, and niece at bedside along with chaplain Jyothi. Patient eyes are open but she is not interacting or responding verbally. She is scowling and open mouth breathing. 2. ACP/goals of care discussion: The patient has an AMD completed that names her  (he is ) followed by two of her daughters , Zander Wood and Lulu Cunningham. Zander Wood is at bedside. There are 4 children total.  Zander Wood tells us that she thinks it is time for hospice, that her mother has been deteriorating since her ORIF last month. She does not feel that her mother will benefit from aggressive treatments. She is requesting the patient to return to [address] with [address] She believes this is consistent with her mother's wishes to have life prolonging treatments at end of life. She wants to focus on symptoms management and comfort and elected to start comfort measures here in the hospital.  She signed a POST form for DNR/DNI, comfort measures, no feeding tube.    3. Comfort measures only: Patient appears SOB and in pain, started morphine 5 mg PO Q6 hr scheduled for pain and SOB, with 5 mg PO Q2 hr PRN. Also ordered PRN, ativan, tylenol, and scopolamine patch PRN excess secretions. 4. Pneumonia: the patient's family has elected to DC IV antibiotics at this time as they do not wish for aggressive treatment   5. Debility: The patient's family reports that following her ORIF and rehab admission the patient did not progress with rehab and has stopped eating solid foods, they would like her to return to [address] [place] [address] assisted living, where she was living before her hip fracture, with [facility] [place] [facility], discussed with   6. Initial consult note routed to primary continuity provider   7.  Communicated plan of care with: Palliative IDT       GOALS OF CARE:   comfort    Advance Care Planning 12/15/2017   Patient's Healthcare Decision Maker is: Legal Next of Kin   Primary Decision Maker Name -   Primary Decision Maker Phone Number -   Confirm Advance Directive Yes, on file           HISTORY:     History obtained from: daughter/chart    CHIEF COMPLAINT: AMS, fever, abnormal CXR    HPI/SUBJECTIVE:    The patient is:   [] Verbal and participatory  [x] Non-participatory due to:   Not interacting with environment/caregivers    Clinical Pain Assessment (nonverbal scale for nonverbal patients): Pain: 4  Adult Non-Verbal Pain Assessment    Face  [] 0   No particular expression or smile  [] 1   Occasional grimace, tearing, frowning, wrinkled forehead  [x] 2   Frequent grimace, tearing, frowning, wrinkled forehead    Activity (movement)  [x] 0   Lying quietly, normal position  [] 1   Seeking attention through movement or slow, cautious movement  [] 2   Restless, excessive activity and/or withdrawal reflexes    Guarding  [] 0   Lying quietly, no positioning of hands over areas of body  [] 1   Splinting areas of the body, tense  [x] 2   Rigid, stiff    Physiology (vital signs)  [x] 0   Stable vital signs  [] 1   Change in any of the following: SBP > 20mm Hg; HR > 20/minute  [] 2   Change in any of the following: SBP > 30mm Hg; HR > 25/minute    Respiratory  [x] 0   Baseline RR/SpO2, compliant with ventilator  [] 1   RR > 10 above baseline, or 5% drop SpO2, mild asynchrony with ventilator  [] 2   RR > 20 above baseline, or 10% drop SpO2, asynchrony with ventilator    Total Non-Verbal Pain Score: 4       FUNCTIONAL ASSESSMENT:     Palliative Performance Scale (PPS): 20          PSYCHOSOCIAL/SPIRITUAL SCREENING:     Advance Care Planning:  Advance Care Planning 12/15/2017   Patient's Healthcare Decision Maker is: Legal Next of Kin   Primary Decision Maker Name -   Primary Decision [address] Phone Number -   Confirm Advance Directive Yes, on file        Any spiritual / Advent concerns:  [] Yes /  [x] No    Caregiver Burnout:  [] Yes /  [x] No /  [] No Caregiver Present      Anticipatory grief assessment:   [x] Normal  / [] Maladaptive       ESAS Anxiety:      ESAS Depression:          REVIEW OF SYSTEMS:     Positive and pertinent negative findings in ROS are noted above in HPI. The following systems were [] reviewed / [x] unable to be reviewed as noted in HPI  Other findings are noted below. Systems: constitutional, ears/nose/mouth/throat, respiratory, gastrointestinal, genitourinary, musculoskeletal, integumentary, neurologic, psychiatric, endocrine. Positive findings noted below. Modified ESAS Completed by: provider           Pain: 4           Dyspnea: 4           Stool Occurrence(s): 1        PHYSICAL EXAM:     Wt Readings from Last 3 Encounters:   12/16/17 44 kg (97 lb 1.6 oz)   11/19/17 44 kg (97 lb)   08/08/17 40.8 kg (90 lb)     Blood pressure 146/71, pulse 89, temperature 97.9 °F (36.6 °C), resp. rate 18, height 5' 1\" (1.549 m), weight 44 kg (97 lb 1.6 oz), SpO2 93 %.   Pain:  Pain Scale 1: Adult Nonverbal Pain Scale  Pain Intensity 1: 0                 Last bowel movement: 12/17    Constitutional: eyes open but not interactive, scowling  Eyes: pupils equal, anicteric  ENMT: no nasal discharge, moist mucous membranes  Respiratory: open mouth, using accessory muscles  Musculoskeletal: no deformity, no tenderness to palpation  Skin: cool, dry  Neurologic: not following commands         HISTORY:     Active Problems:    Pneumonia (12/15/2017)      Past Medical History:   Diagnosis Date    Acute blood loss as cause of postoperative anemia 11/20/2017    Do not resuscitate status 11/19/2017    Heme positive stool 11/26/2017    History of syncope     Severe aortic stenosis by prior echocardiogram 8/1/2017    Severe mitral regurgitation by prior echocardiogram 8/1/2017      Past Surgical History:   Procedure Laterality Date    HX CHOLECYSTECTOMY  6/2015    HX HEMORRHOIDECTOMY      HX HERNIA REPAIR  06/2015    HX HIP FRACTURE TX Left 11/19/2017    S/P Open reduction internal fixation of left displaced 4-part intertrochanteric femur fracture with intramedullary nail (11/19/2017 - Dr. Kain Greene.)    HX HYSTERECTOMY        Family History   Problem Relation Age of Onset    Heart Disease Mother     Hypertension Mother     Lung Disease Father     Stroke Neg Hx     Diabetes Neg Hx     Cancer Neg Hx      History reviewed, no pertinent family history.   Social History   Substance Use Topics    Smoking status: Former Smoker     Packs/day: 1.00     Years: 22.00     Quit date: 8/1/1967    Smokeless tobacco: Never Used    Alcohol use Yes      Comment: occasional     Allergies   Allergen Reactions    Erythromycin Itching      Current Facility-Administered Medications   Medication Dose Route Frequency    LORazepam (INTENSOL) 2 mg/mL oral concentrate 0.5 mg  0.5 mg Oral Q4H PRN    acetaminophen (TYLENOL) suppository 650 mg  650 mg Rectal Q4H PRN    hyoscyamine SL (LEVSIN/SL) tablet 0.125 mg  0.125 mg SubLINGual Q4H PRN    scopolamine (TRANSDERM-SCOP) 1.5 mg  1.5 mg TransDERmal Q72H PRN    bisacodyl (DULCOLAX) suppository 10 mg  10 mg Rectal DAILY PRN    morphine (ROXANOL) concentrated oral syringe 5 mg  5 mg SubLINGual Q6H    morphine (ROXANOL) concentrated oral syringe 5 mg  5 mg Oral Q2H PRN    ondansetron (ZOFRAN) injection 4 mg  4 mg IntraVENous Q4H PRN        LAB AND IMAGING FINDINGS:     Lab Results   Component Value Date/Time    WBC 5.9 12/17/2017 04:46 AM    HGB 13.9 12/17/2017 04:46 AM    PLATELET 477 58/90/2742 04:46 AM     Lab Results   Component Value Date/Time    Sodium 136 12/18/2017 05:05 AM    Potassium 3.7 12/18/2017 05:05 AM    Chloride 105 12/18/2017 05:05 AM    CO2 23 12/18/2017 05:05 AM    BUN 38 12/18/2017 05:05 AM    Creatinine 0.49 12/18/2017 05:05 AM    Calcium 8.5 12/18/2017 05:05 AM    Magnesium 1.9 12/17/2017 04:46 AM      Lab Results   Component Value Date/Time    AST (SGOT) 24 12/15/2017 11:30 AM    Alk. phosphatase 129 12/15/2017 11:30 AM    Protein, total 7.0 12/15/2017 11:30 AM    Albumin 3.0 12/15/2017 11:30 AM    Globulin 4.0 12/15/2017 11:30 AM     Lab Results   Component Value Date/Time    INR 1.1 12/17/2017 04:46 AM    Prothrombin time 13.4 12/17/2017 04:46 AM    aPTT 23.9 08/11/2010 02:25 PM      No results found for: IRON, FE, TIBC, IBCT, PSAT, FERR   No results found for: PH, PCO2, PO2  No components found for: [place]   Lab Results   Component Value Date/Time    CK 41 11/19/2017 10:05 AM    CK - MB 1.0 11/19/2017 10:05 AM              Total time: 70  Counseling / coordination time, spent as noted above: 65  > 50% counseling / coordination?:  Yes, with family, case management    Prolonged service was provided for  []30 min   []75 min in face to face time in the presence of the patient, spent as noted above. Time Start:   Time End:   Note: this can only be billed with 30799 (initial) or 90858 (follow up). If multiple start / stop times, list each separately.

## 2017-12-19 NOTE — PROGRESS NOTES
0805: Shift assessment performed. Patient provided oral care  1014: Patient resting comfortably. Family at bedside, no needs verbalized  1112: Report called to [address] [place] [address] to [address] [place] [address] Way: Patient given medication  1350: Patient taken by transport. Family given all personal belongings. No IV in place and armband was removed.

## 2017-12-19 NOTE — DISCHARGE INSTRUCTIONS
Pneumonia: Care Instructions  Your Care Instructions    Pneumonia is an infection of the lungs. Most cases are caused by infections from bacteria or viruses. Pneumonia may be mild or very severe. If it is caused by bacteria, you will be treated with antibiotics. It may take a few weeks to a few months to recover fully from pneumonia, depending on how sick you were and whether your overall health is good. Follow-up care is a key part of your treatment and safety. Be sure to make and go to all appointments, and call your doctor if you are having problems. It's also a good idea to know your test results and keep a list of the medicines you take. How can you care for yourself at home? · Take your antibiotics exactly as directed. Do not stop taking the medicine just because you are feeling better. You need to take the full course of antibiotics. · Take your medicines exactly as prescribed. Call your doctor if you think you are having a problem with your medicine. · Get plenty of rest and sleep. You may feel weak and tired for a while, but your energy level will improve with time. · To prevent dehydration, drink plenty of fluids, enough so that your urine is light yellow or clear like water. Choose water and other caffeine-free clear liquids until you feel better. If you have kidney, heart, or liver disease and have to limit fluids, talk with your doctor before you increase the amount of fluids you drink. · Take care of your cough so you can rest. A cough that brings up mucus from your lungs is common with pneumonia. It is one way your body gets rid of the infection. But if coughing keeps you from resting or causes severe fatigue and chest-wall pain, talk to your doctor. He or she may suggest that you take a medicine to reduce the cough. · Use a vaporizer or humidifier to add moisture to your bedroom. Follow the directions for cleaning the machine. · Do not smoke or allow others to smoke around you.  Smoke will make your cough last longer. If you need help quitting, talk to your doctor about stop-smoking programs and medicines. These can increase your chances of quitting for good. · Take an over-the-counter pain medicine, such as acetaminophen (Tylenol), ibuprofen (Advil, Motrin), or naproxen (Aleve). Read and follow all instructions on the label. · Do not take two or more pain medicines at the same time unless the doctor told you to. Many pain medicines have acetaminophen, which is Tylenol. Too much acetaminophen (Tylenol) can be harmful. · If you were given a spirometer to measure how well your lungs are working, use it as instructed. This can help your doctor tell how your recovery is going. · To prevent pneumonia in the future, talk to your doctor about getting a flu vaccine (once a year) and a pneumococcal vaccine (one time only for most people). When should you call for help? Call 911 anytime you think you may need emergency care. For example, call if:  ? · You have severe trouble breathing. ?Call your doctor now or seek immediate medical care if:  ? · You cough up dark brown or bloody mucus (sputum). ? · You have new or worse trouble breathing. ? · You are dizzy or lightheaded, or you feel like you may faint. ? Watch closely for changes in your health, and be sure to contact your doctor if:  ? · You have a new or higher fever. ? · You are coughing more deeply or more often. ? · You are not getting better after 2 days (48 hours). ? · You do not get better as expected. Where can you learn more? Go to http://fanny-chad.info/. Enter [id number] in the search box to learn more about \"Pneumonia: Care Instructions. \"  Current as of: May 12, 2017  Content Version: 11.4  © 2867-7218 [employer]. Care instructions adapted under license by [employer] (which disclaims liability or warranty for this information).  If you have questions about a medical condition or this instruction, always ask your healthcare professional. [address] any warranty or liability for your use of this information. Learning About Hospice and Palliative Care  What are hospice and palliative care? Palliative (say \"PAL-lionel-uh-tiv\") care is an area of medicine that helps give you more good days by providing care for quality-of-life issues. It includes treating symptoms like pain, nausea, or sleep problems. It can also include helping you and your loved ones to:  · Understand your illness better. · Talk more openly about your feelings. · Decide what treatments you want or don't want. · Communicate better with your doctors, nurses, and each other. Hospice care is a type of palliative care. But it's for people who are near the end of life. What kinds of care are involved? Palliative care: This treatment helps you feel better physically, emotionally, and spiritually while doctors also treat your illness. Your care may include pain relief, counseling, or nutrition advice. Hospice care: Again, the goal of this type of care is to help you feel better. And it can help you get the most out of the time you have left. But you no longer get treatment to try to cure your illness. When does care happen? Palliative care: This care can happen at any time during a serious illness. You don't have to be near death to get this care. Hospice care: In most cases, you can choose hospice care when your doctor believes that you have no more than about 6 months to live. Where does the care happen? Palliative care: This care often happens in hospitals or long-term care facilities like nursing homes. It can take place wherever you are treated, even in your home. Hospice care: Most hospice care is done in the place the patient calls \"home. \" This is often the person's home. But it could also be a place like a nursing home or half-way center.  Hospice care may also be given in hospice centers, hospitals, and other places. Who provides the care? Palliative care: There are doctors and nurses who specialize in this field. But your own doctor may also give some of this care. And there are many other experts who may help you. These include social workers, counselors, therapists, and nutrition experts. Hospice care: In hospitals, hospice centers, and other facilities, care is given by doctors, nurses, and others who are trained in hospice care. In the home, a family member is often the main caregiver. But the family member gets help from care experts. They are on call 24 hours a day. Where can you learn more? Go to http://fanny-chad.info/. Enter [id number] in the search box to learn more about \"Learning About Hospice and Palliative Care. \"  Current as of: September 24, 2016  Content Version: 11.4  © 1023-5207 [employer]. Care instructions adapted under license by [employer] (which disclaims liability or warranty for this information). If you have questions about a medical condition or this instruction, always ask your healthcare professional. [place] [address] any warranty or liability for your use of this information. Patient armband removed and shredded    DISCHARGE SUMMARY from Nurse    PATIENT INSTRUCTIONS:    After general anesthesia or intravenous sedation, for 24 hours or while taking prescription Narcotics:  · Limit your activities  · Do not drive and operate hazardous machinery  · Do not make important personal or business decisions  · Do  not drink alcoholic beverages  · If you have not urinated within 8 hours after discharge, please contact your surgeon on call.     Report the following to your surgeon:  · Excessive pain, swelling, redness or odor of or around the surgical area  · Temperature over 100.5  · Nausea and vomiting lasting longer than 4 hours or if unable to take medications  · Any signs of decreased circulation or nerve impairment to extremity: change in color, persistent  numbness, tingling, coldness or increase pain  · Any questions    What to do at Home:  Recommended activity: Activity as tolerated      *  Please give a list of your current medications to your Primary Care Provider. *  Please update this list whenever your medications are discontinued, doses are      changed, or new medications (including over-the-counter products) are added. *  Please carry medication information at all times in case of emergency situations. These are general instructions for a healthy lifestyle:    No smoking/ No tobacco products/ Avoid exposure to second hand smoke  Surgeon General's Warning:  Quitting smoking now greatly reduces serious risk to your health. Obesity, smoking, and sedentary lifestyle greatly increases your risk for illness    A healthy diet, regular physical exercise & weight monitoring are important for maintaining a healthy lifestyle    You may be retaining fluid if you have a history of heart failure or if you experience any of the following symptoms:  Weight gain of 3 pounds or more overnight or 5 pounds in a week, increased swelling in our hands or feet or shortness of breath while lying flat in bed. Please call your doctor as soon as you notice any of these symptoms; do not wait until your next office visit. Recognize signs and symptoms of STROKE:    F-face looks uneven    A-arms unable to move or move unevenly    S-speech slurred or non-existent    T-time-call 911 as soon as signs and symptoms begin-DO NOT go       Back to bed or wait to see if you get better-TIME IS BRAIN. Warning Signs of HEART ATTACK     Call 911 if you have these symptoms:   Chest discomfort. Most heart attacks involve discomfort in the center of the chest that lasts more than a few minutes, or that goes away and comes back. It can feel like uncomfortable pressure, squeezing, fullness, or pain.    Discomfort in other areas of the upper body. Symptoms can include pain or discomfort in one or both arms, the back, neck, jaw, or stomach.  Shortness of breath with or without chest discomfort.  Other signs may include breaking out in a cold sweat, nausea, or lightheadedness. Don't wait more than five minutes to call 911 - MINUTES MATTER! Fast action can save your life. Calling 911 is almost always the fastest way to get lifesaving treatment. Emergency Medical Services staff can begin treatment when they arrive -- up to an hour sooner than if someone gets to the hospital by car. The discharge information has been reviewed with the caregiver. The caregiver verbalized understanding. Discharge medications reviewed with the caregiver and appropriate educational materials and side effects teaching were provided.   ___________________________________________________________________________________________________________________________________

## 2017-12-19 NOTE — ROUTINE PROCESS
Bedside and Verbal shift change report given to Jorge Conti RN  (oncoming nurse) by Alexsander Booth RN (offgoing nurse). Report included the following information SBAR, Kardex, MAR and Recent Results. SITUATION:    Code Status: DNR   Reason for Admission: Pneumonia    [facility] day: 3   Problem List:       Hospital Problems  Date Reviewed: 11/19/2017          Codes Class Noted POA    Pneumonia ICD-10-CM: J18.9  ICD-9-CM: 486  12/15/2017 Unknown              BACKGROUND:    Past Medical History:   Past Medical History:   Diagnosis Date    Acute blood loss as cause of postoperative anemia 11/20/2017    Do not resuscitate status 11/19/2017    Heme positive stool 11/26/2017    History of syncope     Severe aortic stenosis by prior echocardiogram 8/1/2017    Severe mitral regurgitation by prior echocardiogram 8/1/2017         Patient taking anticoagulants pt's family refuses.     ASSESSMENT:    Changes in Assessment Throughout Shift: no     Patient has Central Line: no Reasons if yes: n/a   Patient has Reed Cath: no Reasons if yes: n/a      Last Vitals:     Vitals:    12/18/17 0400 12/18/17 0803 12/18/17 1124 12/18/17 1508   BP: 146/82 146/77 146/71 138/77   Pulse: 90 91 89 93   Resp: 16 16 18 16   Temp: 96.1 °F (35.6 °C) 97.1 °F (36.2 °C) 97.9 °F (36.6 °C) 97.3 °F (36.3 °C)   SpO2: 94% 94% 93% 97%   Weight:       Height:            IV and DRAINS (will only show if present)   [REMOVED] Peripheral IV 12/15/17 Left Hand-Site Assessment: Clean, dry, & intact  [REMOVED] Peripheral IV 12/15/17 Left Wrist-Site Assessment: Clean, dry, & intact  [REMOVED] Peripheral IV 12/15/17 Left Antecubital-Site Assessment: Clean, dry, & intact  External Female Catheter 12/17/17-Site Assessment: Clean, dry, & intact     WOUND (if present)   Wound Type:  unstageable lt heel   Dressing present: no   Wound Concerns/Notes:  boots     PAIN    Pain Assessment    Pain Intensity 1: 0 (12/18/17 1825)         Pain Intervention(s) 1: Medication (see MAR)    Patient Stated Pain Goal: Unable to verbalize/indicatate pain  o Interventions for Pain:  none  o Intervention effective: n/a  o Time of last intervention: none  o Reassessment Completed: n/a     Last 3 Weights:  Last 3 Recorded Weights in this Encounter    12/15/17 1143 12/15/17 1146 12/16/17 2114   Weight: 44 kg (97 lb) 38.1 kg (83 lb 15.9 oz) 44 kg (97 lb 1.6 oz)     Weight change:      INTAKE/OUPUT    Current Shift:      Last three shifts: 12/17 0701 - 12/18 1900  In: 0   Out: 2125 [Urine:2125]     LAB RESULTS     Recent Labs      12/17/17   0446  12/16/17   0430   WBC  5.9  4.6   HGB  13.9  10.5*   HCT  42.2  32.3*   PLT  171  168        Recent Labs      12/18/17   0505  12/17/17   0446  12/16/17   0430   NA  136  134*  135*   K  3.7  3.7  3.8   GLU  132*  58*  89   BUN  38*  22*  32*   CREA  0.49*  0.40*  0.55*   CA  8.5  8.6  8.2*   MG   --   1.9   --    INR   --   1.1   --        RECOMMENDATIONS AND DISCHARGE PLANNING     1. Pending tests/procedures/ Plan of Care or Oththe needs; Comfort Care Only    2. Discharge plan for patient and Needs/Barriers: Home with hospice    3. Estimated Discharge Date: 12/19/17 Posted on Whiteboard in Patients Room: no     4. The patient's care plan was reviewed with the oncoming nurse. \"HEALS\" SAFETY CHECK      Fall Risk    Total Score: 3    Safety Measures: Safety Measures: Bed in low position, Bed/Chair-Wheels locked, Bed/Chair alarm on, Call light within reach    A safety check occurred in the patient's room between off going nurse and oncoming nurse listed above.     The safety check included the below items  Area Items   H  High Alert Medications - Verify all high alert medication drips (heparin, PCA, etc.)   E  Equipment - Suction is set up for ALL patients (with yanker)  - Red plugs utilized for all equipment (IV pumps, etc.)  - WOWs wiped down at end of shift.  - Room stocked with oxygen, suction, and other unit-specific supplies   A  Alarms - Bed alarm is set for fall risk patients  - Ensure chair alarm is in place and activated if patient is up in a chair   L  Lines - Check IV for any infiltration  - Reed bag is empty if patient has a Reed   - Tubing and IV bags are labeled   S  Safety   - Room is clean, patient is clean, and equipment is clean. - Hallways are clear from equipment besides carts. - Fall bracelet on for fall risk patients  - Ensure room is clear and free of clutter  - Suction is set up for ALL patients (with yanker)  - Hallways are clear from equipment besides carts.    - Isolation precautions followed, supplies available outside room, sign posted     Jie Hilton RN

## 2017-12-20 NOTE — DISCHARGE SUMMARY
[address] Olena Tuttle  MR#: [id number]  : 1925  ACCOUNT #: [de-identified]   ADMIT DATE: 12/15/2017  DISCHARGE DATE: 2017    DATE OF ADMISSION:  12/15/2017. DATE OF DISCHARGE:  2017. DISCHARGE DIAGNOSES:  1. Acute metabolic encephalopathy. 2.  Pneumonia. 3.  Left hip fracture, status post repair on 2017.    4.  Severe aortic stenosis. 5.  Severe protein calorie malnutrition. 6.  Pressure ulcer, stage II, left heel, present on admission. 7.  Comfort care. SERVICE:  The patient was admitted to hospice service. CONSULTS:  Palliative care was consulted. HISTORY OF PRESENT ILLNESS:  Please refer to the admission H and P.  Briefly, the patient is a 80-year-old female with a history significant for above who came to the emergency department via skilled nursing facility for confusion. She never returned back to baseline according to her family, continued to have confusion. In the skilled nursing facility, she apparently was diagnosed with pneumonia and had completed 2 courses of antibiotics. She was spiking fevers and had a nonproductive cough. Seen by us here, found to have a chest x-ray with left lower lobe consolidation as well as a fever of 100.7. She started on triple antibiotic therapy for healthcare-associated pneumonia. When we saw her, vital signs were stable and normal except for the temperature of 100.7 and a pulse of 55. She had a respiratory rate of 27. She appeared to be in no distress. Had an otherwise regular heart, clear lungs, benign abdomen and no edema. HOSPITAL COURSE BY PROBLEM:  1. In summary, managed medically, and eventually palliative care was consulted. Decision was made with palliative care as well as patient and family, and they wish to maintain the patient on DNR status as well as comfort care. The patient was transitioned over to comfort measures and will be going to SNF for hospice.   I met with the family today, reviewed current medications. The patient has been comfortable in appearance. 2.  In regards to other medical issues, we maintained our medical therapy for her pneumonia, this was discontinued once the patient was transitioned over to comfort measures. 3.  On examination today, vital signs are stable and normal, patient appeared to be comfortable, was not responsive to verbal stimuli and appeared to be resting well. Seen with family at bedside. She had a regular heart and clear lungs on anterior examination, benign abdomen, no calf tenderness or edema. DISPOSITION:  Hospice. MEDICATIONS AT DISCHARGE:  1.  Levsin 0.125 mg sublingually 4 hours as needed. 2.  Lorazepam 2 mg/mL solution, to take 0.25 mL by mouth every 4 hours as needed. 3.  Roxanol 20 mg per mL, to take 0.25 mL by mouth every 2 hours as needed. 4.  Scopolamine patch 1 mg q.72h hours as needed for secretions. FOLLOWUP:  With hospice on discharge.       MD JUAN C Moura/SEBASTIEN  D: 12/19/2017 14:19     T: 12/19/2017 17:27  JOB #: [id number]

## 2017-12-21 LAB
BACTERIA SPEC CULT: NORMAL
BACTERIA SPEC CULT: NORMAL
SERVICE CMNT-IMP: NORMAL
SERVICE CMNT-IMP: NORMAL

## 2018-02-09 LAB
BACTERIA SPEC CULT: ABNORMAL
BACTERIA SPEC CULT: ABNORMAL
GRAM STN SPEC: ABNORMAL
SERVICE CMNT-IMP: ABNORMAL

## 2023-05-09 NOTE — IP AVS SNAPSHOT
[address] Patient: [facility] MRN: [id number] My Medications STOP taking these medications   
 acetaminophen 325 mg tablet Commonly known as:  TYLENOL  
   
  
 bisacodyl 5 mg EC tablet Commonly known as:  DULCOLAX HYDROcodone-acetaminophen 5-325 mg per tablet Commonly known as:  [address] these medications as instructed Instructions Each Dose to Equal  
 Morning Noon Evening Bedtime  
 hyoscyamine SL 0.125 mg SL tablet Commonly known as:  LEVSIN/SL Your last dose was: Your next dose is:    
   
   
 1 Tab by SubLINGual route every four (4) hours as needed. 0.125 mg LORazepam 2 mg/mL concentrated solution Commonly known as:  INTENSOL Your last dose was: Your next dose is: Take 0.25 mL by mouth every four (4) hours as needed. Max Daily Amount: 3 mg. 0.5 mg  
    
   
   
   
  
 morphine 20 mg/mL concentrated oral syringe Commonly known as:  Corinne Baker Your last dose was: Your next dose is: Take 0.25 mL by mouth every two (2) hours as needed. Max Daily Amount: 60 mg.  
 5 mg  
    
   
   
   
  
 scopolamine 1 mg over 3 days Pt3d Commonly known as:  TRANSDERM-SCOP Your last dose was: Your next dose is:    
   
   
 1 Patch by TransDERmal route every seventy-two (72) hours as needed for Other (Secretions). 1.5 mg Where to Get Your Medications Information on where to get these meds will be given to you by the nurse or doctor. ! Ask your nurse or doctor about these medications  
  hyoscyamine SL 0.125 mg SL tablet LORazepam 2 mg/mL concentrated solution  
 morphine 20 mg/mL concentrated oral syringe  
 scopolamine 1 mg over 3 days Pt3d Private car

## (undated) DEVICE — SLIM BODY SKIN STAPLER: Brand: APPOSE ULC

## (undated) DEVICE — AIRLIFE™ NASAL OXYGEN CANNULA CURVED, NONFLARED TIP WITH 14 FOOT (4.3 M) CRUSH-RESISTANT TUBING, OVER-THE-EAR STYLE: Brand: AIRLIFE™

## (undated) DEVICE — (D)GLOVE SURG TRIFLX 8 PWD LTX -- DISC BY MFR USE ITEM 302994

## (undated) DEVICE — 6617 IOBAN II PATIENT ISOLATION DRAPE 5/BX,4BX/CS: Brand: STERI-DRAPE™ IOBAN™ 2

## (undated) DEVICE — SPONGE LAP 18X18IN STRL -- 5/PK

## (undated) DEVICE — OCCLUSIVE GAUZE STRIP,3% BISMUTH TRIBROMOPHENATE IN PETROLATUM BLEND: Brand: XEROFORM

## (undated) DEVICE — PACK PROCEDURE SURG MAJ W/ BASIN LF

## (undated) DEVICE — ABDOMINAL PAD: Brand: DERMACEA

## (undated) DEVICE — REM POLYHESIVE ADULT PATIENT RETURN ELECTRODE: Brand: VALLEYLAB

## (undated) DEVICE — 4.2MM THREE-FLUTED DRILL BIT QC/NEEDLE POINT/145MM

## (undated) DEVICE — 3M™ BAIR PAWS FLEX™ WARMING GOWN, STANDARD, 20 PER CASE 81003: Brand: BAIR PAWS™

## (undated) DEVICE — PREP CHLORAPREP 10.5 ML ORG --

## (undated) DEVICE — SOLUTION IV 1000ML 0.9% SOD CHL

## (undated) DEVICE — 3M™ MICROFOAM™ TAPE 1528-4: Brand: 3M™ MICROFOAM™

## (undated) DEVICE — INTENDED FOR TISSUE SEPARATION, AND OTHER PROCEDURES THAT REQUIRE A SHARP SURGICAL BLADE TO PUNCTURE OR CUT.: Brand: BARD-PARKER SAFETY BLADES SIZE 10, STERILE

## (undated) DEVICE — KIT CLN UP BON SECOURS MARYV

## (undated) DEVICE — (D)PACK ICE DISP -- DISC BY MFR

## (undated) DEVICE — SUTURE VCRL SZ 2 L27IN ABSRB VLT L65MM TP-1 1/2 CIR J649G

## (undated) DEVICE — SUTURE MCRYL SZ 3 0 L18IN ABSRB VLT CT 1 L36MM 1 2 CIR TAPR Y738D

## (undated) DEVICE — FLEX ADVANTAGE 3000CC: Brand: FLEX ADVANTAGE

## (undated) DEVICE — SUTURE VCRL SZ 2-0 L36IN ABSRB VLT CTX L48MM 1/2 CIR SGL J369H

## (undated) DEVICE — LIGHT HANDLE: Brand: DEVON

## (undated) DEVICE — 3.2MM GUIDE WIRE 400MM

## (undated) DEVICE — ROD RMR L950MM DIA2.5MM W/ EXTN BALL TIP

## (undated) DEVICE — GAUZE SPONGES,16 PLY: Brand: CURITY

## (undated) DEVICE — THREE-QUARTER SHEET: Brand: CONVERTORS